# Patient Record
Sex: MALE | Race: WHITE | NOT HISPANIC OR LATINO | Employment: PART TIME | URBAN - METROPOLITAN AREA
[De-identification: names, ages, dates, MRNs, and addresses within clinical notes are randomized per-mention and may not be internally consistent; named-entity substitution may affect disease eponyms.]

---

## 2019-10-05 ENCOUNTER — HOSPITAL ENCOUNTER (INPATIENT)
Facility: HOSPITAL | Age: 49
LOS: 6 days | Discharge: HOME | End: 2019-10-11
Attending: EMERGENCY MEDICINE | Admitting: FAMILY MEDICINE

## 2019-10-05 ENCOUNTER — APPOINTMENT (OUTPATIENT)
Dept: CT IMAGING | Facility: HOSPITAL | Age: 49
End: 2019-10-05

## 2019-10-05 DIAGNOSIS — D50.0 IRON DEFICIENCY ANEMIA DUE TO CHRONIC BLOOD LOSS: ICD-10-CM

## 2019-10-05 DIAGNOSIS — K62.5 RECTAL BLEEDING: ICD-10-CM

## 2019-10-05 DIAGNOSIS — R19.7 DIARRHEA, UNSPECIFIED TYPE: ICD-10-CM

## 2019-10-05 DIAGNOSIS — R63.4 WEIGHT LOSS: ICD-10-CM

## 2019-10-05 DIAGNOSIS — K51.00 PANCOLITIS (HCC): Primary | ICD-10-CM

## 2019-10-05 DIAGNOSIS — D64.9 ANEMIA, UNSPECIFIED TYPE: ICD-10-CM

## 2019-10-05 PROBLEM — D75.839 THROMBOCYTOSIS: Status: ACTIVE | Noted: 2019-10-05

## 2019-10-05 PROBLEM — E87.6 HYPOKALEMIA: Status: ACTIVE | Noted: 2019-10-05

## 2019-10-05 PROBLEM — E88.09 HYPOALBUMINEMIA: Status: ACTIVE | Noted: 2019-10-05

## 2019-10-05 LAB
ALBUMIN SERPL-MCNC: 1.9 G/DL (ref 3.5–5.2)
ALBUMIN/GLOB SERPL: 0.5 G/DL
ALP SERPL-CCNC: 76 U/L (ref 39–117)
ALT SERPL W P-5'-P-CCNC: 15 U/L (ref 1–41)
ANION GAP SERPL CALCULATED.3IONS-SCNC: 10 MMOL/L (ref 5–15)
AST SERPL-CCNC: 16 U/L (ref 1–40)
BASOPHILS # BLD MANUAL: 0 10*3/MM3 (ref 0–0.2)
BASOPHILS NFR BLD AUTO: 0 % (ref 0–1.5)
BILIRUB SERPL-MCNC: 0.3 MG/DL (ref 0.2–1.2)
BILIRUB UR QL STRIP: NEGATIVE
BUN BLD-MCNC: 9 MG/DL (ref 6–20)
BUN/CREAT SERPL: 13.6 (ref 7–25)
C DIFF TOX GENS STL QL NAA+PROBE: NOT DETECTED
CALCIUM SPEC-SCNC: 7.8 MG/DL (ref 8.6–10.5)
CHLORIDE SERPL-SCNC: 99 MMOL/L (ref 98–107)
CLARITY UR: CLEAR
CO2 SERPL-SCNC: 23 MMOL/L (ref 22–29)
COLOR UR: ABNORMAL
CREAT BLD-MCNC: 0.66 MG/DL (ref 0.76–1.27)
D-LACTATE SERPL-SCNC: 0.8 MMOL/L (ref 0.5–2)
DEPRECATED RDW RBC AUTO: 57.9 FL (ref 37–54)
EOSINOPHIL # BLD MANUAL: 0.27 10*3/MM3 (ref 0–0.4)
EOSINOPHIL NFR BLD MANUAL: 6 % (ref 0.3–6.2)
ERYTHROCYTE [DISTWIDTH] IN BLOOD BY AUTOMATED COUNT: 18.7 % (ref 12.3–15.4)
GFR SERPL CREATININE-BSD FRML MDRD: 127 ML/MIN/1.73
GLOBULIN UR ELPH-MCNC: 4 GM/DL
GLUCOSE BLD-MCNC: 105 MG/DL (ref 65–99)
GLUCOSE UR STRIP-MCNC: NEGATIVE MG/DL
HCT VFR BLD AUTO: 26.9 % (ref 37.5–51)
HGB BLD-MCNC: 8.2 G/DL (ref 13–17.7)
HGB UR QL STRIP.AUTO: NEGATIVE
HOLD SPECIMEN: NORMAL
HOLD SPECIMEN: NORMAL
KETONES UR QL STRIP: NEGATIVE
LEUKOCYTE ESTERASE UR QL STRIP.AUTO: NEGATIVE
LIPASE SERPL-CCNC: 73 U/L (ref 13–60)
LYMPHOCYTES # BLD MANUAL: 0.98 10*3/MM3 (ref 0.7–3.1)
LYMPHOCYTES NFR BLD MANUAL: 10 % (ref 5–12)
LYMPHOCYTES NFR BLD MANUAL: 22 % (ref 19.6–45.3)
MCH RBC QN AUTO: 26.1 PG (ref 26.6–33)
MCHC RBC AUTO-ENTMCNC: 30.5 G/DL (ref 31.5–35.7)
MCV RBC AUTO: 85.7 FL (ref 79–97)
METAMYELOCYTES NFR BLD MANUAL: 2 % (ref 0–0)
MONOCYTES # BLD AUTO: 0.44 10*3/MM3 (ref 0.1–0.9)
MYELOCYTES NFR BLD MANUAL: 1 % (ref 0–0)
NEUTROPHILS # BLD AUTO: 2.62 10*3/MM3 (ref 1.7–7)
NEUTROPHILS NFR BLD MANUAL: 38 % (ref 42.7–76)
NEUTS BAND NFR BLD MANUAL: 21 % (ref 0–5)
NITRITE UR QL STRIP: NEGATIVE
PH UR STRIP.AUTO: 5.5 [PH] (ref 5–8)
PLAT MORPH BLD: NORMAL
PLATELET # BLD AUTO: 483 10*3/MM3 (ref 140–450)
PMV BLD AUTO: 8.3 FL (ref 6–12)
POTASSIUM BLD-SCNC: 3.3 MMOL/L (ref 3.5–5.2)
PROT SERPL-MCNC: 5.9 G/DL (ref 6–8.5)
PROT UR QL STRIP: ABNORMAL
RBC # BLD AUTO: 3.14 10*6/MM3 (ref 4.14–5.8)
RBC MORPH BLD: NORMAL
SODIUM BLD-SCNC: 132 MMOL/L (ref 136–145)
SP GR UR STRIP: 1.02 (ref 1–1.03)
UROBILINOGEN UR QL STRIP: ABNORMAL
WBC MORPH BLD: NORMAL
WBC NRBC COR # BLD: 4.44 10*3/MM3 (ref 3.4–10.8)
WHOLE BLOOD HOLD SPECIMEN: NORMAL
WHOLE BLOOD HOLD SPECIMEN: NORMAL

## 2019-10-05 PROCEDURE — 82728 ASSAY OF FERRITIN: CPT | Performed by: INTERNAL MEDICINE

## 2019-10-05 PROCEDURE — 81003 URINALYSIS AUTO W/O SCOPE: CPT | Performed by: EMERGENCY MEDICINE

## 2019-10-05 PROCEDURE — 85045 AUTOMATED RETICULOCYTE COUNT: CPT | Performed by: INTERNAL MEDICINE

## 2019-10-05 PROCEDURE — 83735 ASSAY OF MAGNESIUM: CPT | Performed by: INTERNAL MEDICINE

## 2019-10-05 PROCEDURE — 0097U HC BIOFIRE FILMARRAY GI PANEL: CPT | Performed by: PHYSICIAN ASSISTANT

## 2019-10-05 PROCEDURE — 87493 C DIFF AMPLIFIED PROBE: CPT | Performed by: PHYSICIAN ASSISTANT

## 2019-10-05 PROCEDURE — 85025 COMPLETE CBC W/AUTO DIFF WBC: CPT | Performed by: EMERGENCY MEDICINE

## 2019-10-05 PROCEDURE — 83690 ASSAY OF LIPASE: CPT | Performed by: EMERGENCY MEDICINE

## 2019-10-05 PROCEDURE — 84100 ASSAY OF PHOSPHORUS: CPT | Performed by: INTERNAL MEDICINE

## 2019-10-05 PROCEDURE — 99284 EMERGENCY DEPT VISIT MOD MDM: CPT

## 2019-10-05 PROCEDURE — 83540 ASSAY OF IRON: CPT | Performed by: INTERNAL MEDICINE

## 2019-10-05 PROCEDURE — 85007 BL SMEAR W/DIFF WBC COUNT: CPT | Performed by: EMERGENCY MEDICINE

## 2019-10-05 PROCEDURE — 74176 CT ABD & PELVIS W/O CONTRAST: CPT

## 2019-10-05 PROCEDURE — 82746 ASSAY OF FOLIC ACID SERUM: CPT | Performed by: INTERNAL MEDICINE

## 2019-10-05 PROCEDURE — 84145 PROCALCITONIN (PCT): CPT | Performed by: INTERNAL MEDICINE

## 2019-10-05 PROCEDURE — 85652 RBC SED RATE AUTOMATED: CPT | Performed by: INTERNAL MEDICINE

## 2019-10-05 PROCEDURE — 86900 BLOOD TYPING SEROLOGIC ABO: CPT | Performed by: INTERNAL MEDICINE

## 2019-10-05 PROCEDURE — 86923 COMPATIBILITY TEST ELECTRIC: CPT

## 2019-10-05 PROCEDURE — 80053 COMPREHEN METABOLIC PANEL: CPT | Performed by: EMERGENCY MEDICINE

## 2019-10-05 PROCEDURE — 86140 C-REACTIVE PROTEIN: CPT | Performed by: INTERNAL MEDICINE

## 2019-10-05 PROCEDURE — 84466 ASSAY OF TRANSFERRIN: CPT | Performed by: INTERNAL MEDICINE

## 2019-10-05 PROCEDURE — 82607 VITAMIN B-12: CPT | Performed by: INTERNAL MEDICINE

## 2019-10-05 PROCEDURE — 83605 ASSAY OF LACTIC ACID: CPT | Performed by: EMERGENCY MEDICINE

## 2019-10-05 PROCEDURE — 25010000002 PIPERACILLIN SOD-TAZOBACTAM PER 1 G: Performed by: PHYSICIAN ASSISTANT

## 2019-10-05 PROCEDURE — 84443 ASSAY THYROID STIM HORMONE: CPT | Performed by: INTERNAL MEDICINE

## 2019-10-05 PROCEDURE — 86850 RBC ANTIBODY SCREEN: CPT | Performed by: INTERNAL MEDICINE

## 2019-10-05 PROCEDURE — 86901 BLOOD TYPING SEROLOGIC RH(D): CPT | Performed by: INTERNAL MEDICINE

## 2019-10-05 RX ORDER — SODIUM CHLORIDE 0.9 % (FLUSH) 0.9 %
10 SYRINGE (ML) INJECTION AS NEEDED
Status: DISCONTINUED | OUTPATIENT
Start: 2019-10-05 | End: 2019-10-11 | Stop reason: HOSPADM

## 2019-10-05 RX ADMIN — SODIUM CHLORIDE 1000 ML: 9 INJECTION, SOLUTION INTRAVENOUS at 17:47

## 2019-10-05 RX ADMIN — SODIUM CHLORIDE 1000 ML: 9 INJECTION, SOLUTION INTRAVENOUS at 22:21

## 2019-10-05 RX ADMIN — TAZOBACTAM SODIUM AND PIPERACILLIN SODIUM 3.38 G: 375; 3 INJECTION, SOLUTION INTRAVENOUS at 22:20

## 2019-10-05 NOTE — ED PROVIDER NOTES
Subjective   Rafa Dan is a 49 y.o. male who presents to the ED with complaints of diarrhea. The patient reports that he has been experience diarrhea for the past several months. He states that he went to [place] on 8/1 and was diagnosed with acute colitis. He was placed on antibiotics, but has not experienced any relief. He notes that he has approximately 20 episodes of diarrhea a day. He denies abdominal pain. He notes that he has lost approximately 30 pounds since July. He denies drug, tobacco, and alcohol use. There are no other acute complaints at this time.        History provided by:  Patient  Diarrhea   The primary symptoms include nausea and diarrhea. Primary symptoms do not include abdominal pain, vomiting or dysuria. The illness began more than 7 days ago. The onset was sudden. The problem has not changed since onset.      Review of Systems   Respiratory: Negative for chest tightness and shortness of breath.    Gastrointestinal: Positive for blood in stool, diarrhea and nausea. Negative for abdominal pain and vomiting.   Genitourinary: Negative for dysuria and frequency.   Neurological: Negative for dizziness and weakness.   Psychiatric/Behavioral: Negative.    All other systems reviewed and are negative.      Past Medical History:   Diagnosis Date   • Colitis        No Known Allergies    History reviewed. No pertinent surgical history.    History reviewed. No pertinent family history.    Social History     Socioeconomic History   • Marital status: Single     Spouse name: Not on file   • Number of children: Not on file   • Years of education: Not on file   • Highest education level: Not on file   Tobacco Use   • Smoking status: Never Smoker   • Smokeless tobacco: Never Used   Substance and Sexual Activity   • Alcohol use: No     Frequency: Never   • Drug use: No         Objective   Physical Exam   Constitutional: He is oriented to person, place, and time. He appears well-developed and well-nourished.    Thin wasted gaunt   HENT:   Head: Normocephalic and atraumatic.   Eyes: Conjunctivae are normal. No scleral icterus.   Neck: Normal range of motion. Neck supple.   Cardiovascular: Normal rate, regular rhythm and normal heart sounds.   Pulmonary/Chest: Effort normal and breath sounds normal.   Abdominal: Soft. There is no tenderness.   Musculoskeletal: Normal range of motion.   Neurological: He is alert and oriented to person, place, and time.   Skin: Skin is warm and dry.   Psychiatric: He has a normal mood and affect. His behavior is normal.   Nursing note and vitals reviewed.      Procedures         ED Course  ED Course as of Oct 06 2135   Sat Oct 05, 2019   2112 Hemoglobin: (!) 8.2 [RS]   2145 Discussed the patient with Dr. Melendez colorectal surgery on call.  She felt like with the 20 pound weight loss the patient is anemic and the pain colitis that he probably should be admitted.  Discussed this with the patient and his's friends/family.  He is in agreements.  I have paged the hospitalist.  [VIVI]   2158 Spoke to Dr. Gomes she is agreed to admit the patient to the hospital telemetry.  [VIVI]      ED Course User Index  [VIVI] Tod Flores PA  [RS] Cuong Vincent MD       Recent Results (from the past 24 hour(s))   Sedimentation Rate    Collection Time: 10/05/19 11:55 PM   Result Value Ref Range    Sed Rate 43 (H) 0 - 20 mm/hr   Reticulocytes    Collection Time: 10/05/19 11:55 PM   Result Value Ref Range    Reticulocyte % 4.46 (H) 0.70 - 1.90 %    Reticulocyte Absolute 0.1383 (H) 0.0200 - 0.1300 10*6/mm3   Type & Screen    Collection Time: 10/05/19 11:55 PM   Result Value Ref Range    ABO Type A     RH type Positive     Antibody Screen Negative     T&S Expiration Date 10/8/2019 11:59:59 PM    Occult Blood X 1, Stool - Stool, Per Rectum    Collection Time: 10/06/19  1:00 AM   Result Value Ref Range    Fecal Occult Blood Positive (A) Negative   Basic Metabolic Panel    Collection Time: 10/06/19  3:30 AM    Result Value Ref Range    Glucose 100 (H) 65 - 99 mg/dL    BUN 7 6 - 20 mg/dL    Creatinine 0.65 (L) 0.76 - 1.27 mg/dL    Sodium 133 (L) 136 - 145 mmol/L    Potassium 3.0 (L) 3.5 - 5.2 mmol/L    Chloride 102 98 - 107 mmol/L    CO2 23.0 22.0 - 29.0 mmol/L    Calcium 6.9 (L) 8.6 - 10.5 mg/dL    eGFR Non African Amer 130 >60 mL/min/1.73    BUN/Creatinine Ratio 10.8 7.0 - 25.0    Anion Gap 8.0 5.0 - 15.0 mmol/L   CBC Auto Differential    Collection Time: 10/06/19  3:30 AM   Result Value Ref Range    WBC 3.19 (L) 3.40 - 10.80 10*3/mm3    RBC 2.74 (L) 4.14 - 5.80 10*6/mm3    Hemoglobin 7.0 (L) 13.0 - 17.7 g/dL    Hematocrit 23.3 (L) 37.5 - 51.0 %    MCV 85.0 79.0 - 97.0 fL    MCH 25.5 (L) 26.6 - 33.0 pg    MCHC 30.0 (L) 31.5 - 35.7 g/dL    RDW 18.7 (H) 12.3 - 15.4 %    RDW-SD 56.0 (H) 37.0 - 54.0 fl    MPV 8.4 6.0 - 12.0 fL    Platelets 408 140 - 450 10*3/mm3   Lipid Panel    Collection Time: 10/06/19  3:30 AM   Result Value Ref Range    Total Cholesterol 81 0 - 200 mg/dL    Triglycerides 30 0 - 150 mg/dL    HDL Cholesterol 39 (L) 40 - 60 mg/dL    LDL Cholesterol  36 0 - 100 mg/dL    VLDL Cholesterol 6 mg/dL    LDL/HDL Ratio 0.92    Vitamin B12    Collection Time: 10/06/19  3:30 AM   Result Value Ref Range    Vitamin B-12 1,270 (H) 211 - 946 pg/mL   HIV-1 / O / 2 Ag / Antibody 4th Generation    Collection Time: 10/06/19  3:30 AM   Result Value Ref Range    HIV-1/ HIV-2 Non-Reactive Non-Reactive   ABO RH Specimen Verification    Collection Time: 10/06/19  3:30 AM   Result Value Ref Range    ABO Type A     RH type Positive    Phosphorus    Collection Time: 10/06/19  3:30 AM   Result Value Ref Range    Phosphorus 2.9 2.5 - 4.5 mg/dL   Scan Slide    Collection Time: 10/06/19  3:30 AM   Result Value Ref Range    Scan Slide     Manual Differential    Collection Time: 10/06/19  3:30 AM   Result Value Ref Range    Neutrophil % 39.0 (L) 42.7 - 76.0 %    Lymphocyte % 22.0 19.6 - 45.3 %    Monocyte % 10.0 5.0 - 12.0 %     Eosinophil % 9.0 (H) 0.3 - 6.2 %    Basophil % 4.0 (H) 0.0 - 1.5 %    Bands %  15.0 (H) 0.0 - 5.0 %    Metamyelocyte % 1.0 (H) 0.0 - 0.0 %    Neutrophils Absolute 1.72 1.70 - 7.00 10*3/mm3    Lymphocytes Absolute 0.70 0.70 - 3.10 10*3/mm3    Monocytes Absolute 0.32 0.10 - 0.90 10*3/mm3    Eosinophils Absolute 0.29 0.00 - 0.40 10*3/mm3    Basophils Absolute 0.13 0.00 - 0.20 10*3/mm3    RBC Morphology Normal Normal    WBC Morphology Normal Normal    Platelet Morphology Normal Normal   Magnesium    Collection Time: 10/06/19  3:30 AM   Result Value Ref Range    Magnesium 1.5 (L) 1.6 - 2.6 mg/dL   Calcium, Ionized    Collection Time: 10/06/19  3:31 AM   Result Value Ref Range    Ionized Calcium 1.15 1.12 - 1.32 mmol/L   CBC (No Diff)    Collection Time: 10/06/19  4:01 PM   Result Value Ref Range    WBC 4.22 3.40 - 10.80 10*3/mm3    RBC 2.53 (L) 4.14 - 5.80 10*6/mm3    Hemoglobin 6.6 (C) 13.0 - 17.7 g/dL    Hematocrit 22.1 (L) 37.5 - 51.0 %    MCV 87.4 79.0 - 97.0 fL    MCH 26.1 (L) 26.6 - 33.0 pg    MCHC 29.9 (L) 31.5 - 35.7 g/dL    RDW 19.2 (H) 12.3 - 15.4 %    RDW-SD 59.1 (H) 37.0 - 54.0 fl    MPV 8.1 6.0 - 12.0 fL    Platelets 403 140 - 450 10*3/mm3   Prepare RBC, 1 Units    Collection Time: 10/06/19  6:39 PM   Result Value Ref Range    Product Code R2726M22     Unit Number [id number]     UNIT  ABO A     UNIT  RH NEG     Dispense Status IS     Blood Type ANEG     Blood Expiration Date [id number]     Blood Type Barcode 0600      Note: In addition to lab results from this visit, the labs listed above may include labs taken at another facility or during a different encounter within the last 24 hours. Please correlate lab times with ED admission and discharge times for further clarification of the services performed during this visit.    CT Abdomen Pelvis Without Contrast   Preliminary Result       There is diffuse pancolonic inflammation without evidence of pericolonic   abscess or free air.       Nonspecific  prominent fluid-filled loops of small bowel suggesting   additional superimposed enteritis.       What is felt to represent the appendix within the right lower quadrant   is inflamed and measures up to 8 mm in thickness, although this is in   the setting of extensive pericecal inflammation and is likely reactive   appendiceal inflammation given the extensive surrounding and adjacent   colitis noted.       DICTATED:   10/05/2019   EDITED/ls :   10/06/2019             Vitals:    10/06/19 1855 10/06/19 1900 10/06/19 1930 10/06/19 1935   BP: 99/62  (!) 79/64 116/63   BP Location:       Patient Position:       Pulse: 87  94 93   Resp:       Temp:       TempSrc:  Oral     SpO2: 100%  100% 100%   Weight:       Height:         Medications   sodium chloride 0.9 % flush 10 mL (not administered)   sodium chloride 0.9 % flush 10 mL (10 mL Intravenous Not Given 10/6/19 2051)   sodium chloride 0.9 % flush 10 mL (not administered)   potassium chloride (MICRO-K) CR capsule 40 mEq (40 mEq Oral Given 10/6/19 1357)     Or   potassium chloride (KLOR-CON) packet 40 mEq ( Oral Not Given:  See Alt 10/6/19 1357)     Or   potassium chloride 10 mEq in 100 mL IVPB ( Intravenous Not Given:  See Alt 10/6/19 1357)   Magnesium Sulfate 2 gram Bolus, followed by 8 gram infusion (total Mg dose 10 grams)- Mg less than or equal to 1mg/dL ( Intravenous Not Given:  See Alt 10/6/19 0559)     Or   Magnesium Sulfate 2 gram / 50mL Infusion (GIVE X 3 BAGS TO EQUAL 6GM TOTAL DOSE) - Mg 1.1 - 1.5 mg/dl ( Intravenous Not Given:  See Alt 10/6/19 0559)     Or   Magnesium Sulfate 4 gram infusion- Mg 1.6-1.9 mg/dL (4 g Intravenous New Bag 10/6/19 0559)   potassium & sodium phosphates (PHOS-NAK) 280-160-250 MG packet - for Phosphorus less than 1.25 mg/dL (not administered)     Or   potassium & sodium phosphates (PHOS-NAK) 280-160-250 MG packet - for Phosphorus 1.25 - 2.5 mg/dL (not administered)   ondansetron (ZOFRAN) tablet 4 mg (not administered)     Or    ondansetron (ZOFRAN) injection 4 mg (not administered)   metroNIDAZOLE (FLAGYL) 500 mg/100mL IVPB (500 mg Intravenous New Bag 10/6/19 1723)   sodium chloride 0.9 % with KCl 20 mEq/L infusion (100 mL/hr Intravenous New Bag 10/6/19 0944)   cefTRIAXone (ROCEPHIN) 2 g/100 mL 0.9% NS VTB (ERIBERTO) (2 g Intravenous New Bag 10/6/19 1549)   lactobacillus acidophilus (RISAQUAD) capsule 1 capsule (1 capsule Oral Given 10/6/19 1357)   PEG-KCl-NaCl-NaSulf-Na Asc-C (MOVIPREP) powder 1,000 mL (1,000 mL Oral Given 10/6/19 2050)     Followed by   PEG-KCl-NaCl-NaSulf-Na Asc-C (MOVIPREP) powder 1,000 mL (not administered)   sodium chloride 0.9 % bolus 1,000 mL (0 mL Intravenous Stopped 10/5/19 2108)   piperacillin-tazobactam (ZOSYN) 3.375 g in iso-osmotic dextrose 50 ml (premix) (0 g Intravenous Stopped 10/5/19 2225)   sodium chloride 0.9 % bolus 1,000 mL (1,000 mL Intravenous New Bag 10/5/19 2221)   sodium chloride 0.9 % bolus 1,000 mL (1,000 mL Intravenous New Bag 10/6/19 0150)   sodium chloride 0.9 % bolus 500 mL (500 mL Intravenous New Bag 10/6/19 1358)     ECG/EMG Results (last 24 hours)     ** No results found for the last 24 hours. **        No orders to display                     MDM  Number of Diagnoses or Management Options  Anemia, unspecified type: new and requires workup  Diarrhea, unspecified type: new and requires workup  Pancolitis (CMS/HCC): new and requires workup  Weight loss: new and requires workup     Amount and/or Complexity of Data Reviewed  Clinical lab tests: reviewed and ordered  Tests in the radiology section of CPT®: reviewed and ordered  Tests in the medicine section of CPT®: ordered and reviewed  Discuss the patient with other providers: yes    Patient Progress  Patient progress: stable      Final diagnoses:   Pancolitis (CMS/HCC)   Anemia, unspecified type   Weight loss   Diarrhea, unspecified type       Documentation assistance provided by cherry Greene.  Information recorded by the  scribe was done at my direction and has been verified and validated by me.     Laney Greene  10/05/19 1819       Tod Flores PA  10/06/19 2098

## 2019-10-06 PROBLEM — E43 SEVERE PROTEIN-CALORIE MALNUTRITION (HCC): Status: ACTIVE | Noted: 2019-10-06

## 2019-10-06 PROBLEM — D50.9 IRON DEFICIENCY ANEMIA: Status: ACTIVE | Noted: 2019-10-05

## 2019-10-06 PROBLEM — R63.4 UNINTENTIONAL WEIGHT LOSS: Status: ACTIVE | Noted: 2019-10-06

## 2019-10-06 PROBLEM — K62.5 RECTAL BLEEDING: Status: ACTIVE | Noted: 2019-10-06

## 2019-10-06 PROBLEM — R19.7 INTRACTABLE DIARRHEA: Status: ACTIVE | Noted: 2019-10-06

## 2019-10-06 LAB
ABO GROUP BLD: NORMAL
ABO GROUP BLD: NORMAL
ADV 40+41 DNA STL QL NAA+NON-PROBE: NOT DETECTED
ANION GAP SERPL CALCULATED.3IONS-SCNC: 8 MMOL/L (ref 5–15)
ASTRO TYP 1-8 RNA STL QL NAA+NON-PROBE: NOT DETECTED
BASOPHILS # BLD MANUAL: 0.13 10*3/MM3 (ref 0–0.2)
BASOPHILS NFR BLD AUTO: 4 % (ref 0–1.5)
BLD GP AB SCN SERPL QL: NEGATIVE
BUN BLD-MCNC: 7 MG/DL (ref 6–20)
BUN/CREAT SERPL: 10.8 (ref 7–25)
C CAYETANENSIS DNA STL QL NAA+NON-PROBE: NOT DETECTED
CA-I SERPL ISE-MCNC: 1.15 MMOL/L (ref 1.12–1.32)
CALCIUM SPEC-SCNC: 6.9 MG/DL (ref 8.6–10.5)
CAMPY SP DNA.DIARRHEA STL QL NAA+PROBE: NOT DETECTED
CHLORIDE SERPL-SCNC: 102 MMOL/L (ref 98–107)
CHOLEST SERPL-MCNC: 81 MG/DL (ref 0–200)
CO2 SERPL-SCNC: 23 MMOL/L (ref 22–29)
CREAT BLD-MCNC: 0.65 MG/DL (ref 0.76–1.27)
CRP SERPL-MCNC: 4.15 MG/DL (ref 0–0.5)
CRYPTOSP STL CULT: NOT DETECTED
DEPRECATED RDW RBC AUTO: 56 FL (ref 37–54)
DEPRECATED RDW RBC AUTO: 59.1 FL (ref 37–54)
E COLI DNA SPEC QL NAA+PROBE: NOT DETECTED
E HISTOLYT AG STL-ACNC: NOT DETECTED
EAEC PAA PLAS AGGR+AATA ST NAA+NON-PRB: NOT DETECTED
EC STX1 + STX2 GENES STL NAA+PROBE: NOT DETECTED
EOSINOPHIL # BLD MANUAL: 0.29 10*3/MM3 (ref 0–0.4)
EOSINOPHIL NFR BLD MANUAL: 9 % (ref 0.3–6.2)
EPEC EAE GENE STL QL NAA+NON-PROBE: NOT DETECTED
ERYTHROCYTE [DISTWIDTH] IN BLOOD BY AUTOMATED COUNT: 18.7 % (ref 12.3–15.4)
ERYTHROCYTE [DISTWIDTH] IN BLOOD BY AUTOMATED COUNT: 19.2 % (ref 12.3–15.4)
ERYTHROCYTE [SEDIMENTATION RATE] IN BLOOD: 43 MM/HR (ref 0–20)
ETEC LTA+ST1A+ST1B TOX ST NAA+NON-PROBE: NOT DETECTED
FERRITIN SERPL-MCNC: 47.07 NG/ML (ref 30–400)
FOLATE SERPL-MCNC: 6.44 NG/ML (ref 4.78–24.2)
G LAMBLIA DNA SPEC QL NAA+PROBE: NOT DETECTED
GFR SERPL CREATININE-BSD FRML MDRD: 130 ML/MIN/1.73
GLUCOSE BLD-MCNC: 100 MG/DL (ref 65–99)
HCT VFR BLD AUTO: 22.1 % (ref 37.5–51)
HCT VFR BLD AUTO: 23.3 % (ref 37.5–51)
HDLC SERPL-MCNC: 39 MG/DL (ref 40–60)
HEMOCCULT STL QL: POSITIVE
HGB BLD-MCNC: 6.6 G/DL (ref 13–17.7)
HGB BLD-MCNC: 7 G/DL (ref 13–17.7)
HIV1+2 AB SER QL: NORMAL
IRON 24H UR-MRATE: 14 MCG/DL (ref 59–158)
IRON SATN MFR SERPL: 8 % (ref 20–50)
LDLC SERPL CALC-MCNC: 36 MG/DL (ref 0–100)
LDLC/HDLC SERPL: 0.92 {RATIO}
LYMPHOCYTES # BLD MANUAL: 0.7 10*3/MM3 (ref 0.7–3.1)
LYMPHOCYTES NFR BLD MANUAL: 10 % (ref 5–12)
LYMPHOCYTES NFR BLD MANUAL: 22 % (ref 19.6–45.3)
MAGNESIUM SERPL-MCNC: 1.5 MG/DL (ref 1.6–2.6)
MAGNESIUM SERPL-MCNC: 1.6 MG/DL (ref 1.6–2.6)
MCH RBC QN AUTO: 25.5 PG (ref 26.6–33)
MCH RBC QN AUTO: 26.1 PG (ref 26.6–33)
MCHC RBC AUTO-ENTMCNC: 29.9 G/DL (ref 31.5–35.7)
MCHC RBC AUTO-ENTMCNC: 30 G/DL (ref 31.5–35.7)
MCV RBC AUTO: 85 FL (ref 79–97)
MCV RBC AUTO: 87.4 FL (ref 79–97)
METAMYELOCYTES NFR BLD MANUAL: 1 % (ref 0–0)
MONOCYTES # BLD AUTO: 0.32 10*3/MM3 (ref 0.1–0.9)
NEUTROPHILS # BLD AUTO: 1.72 10*3/MM3 (ref 1.7–7)
NEUTROPHILS NFR BLD MANUAL: 39 % (ref 42.7–76)
NEUTS BAND NFR BLD MANUAL: 15 % (ref 0–5)
NOROVIRUS GI+II RNA STL QL NAA+NON-PROBE: NOT DETECTED
P SHIGELLOIDES DNA STL QL NAA+PROBE: NOT DETECTED
PHOSPHATE SERPL-MCNC: 2.9 MG/DL (ref 2.5–4.5)
PHOSPHATE SERPL-MCNC: 3.3 MG/DL (ref 2.5–4.5)
PLAT MORPH BLD: NORMAL
PLATELET # BLD AUTO: 403 10*3/MM3 (ref 140–450)
PLATELET # BLD AUTO: 408 10*3/MM3 (ref 140–450)
PMV BLD AUTO: 8.1 FL (ref 6–12)
PMV BLD AUTO: 8.4 FL (ref 6–12)
POTASSIUM BLD-SCNC: 3 MMOL/L (ref 3.5–5.2)
PROCALCITONIN SERPL-MCNC: 0.1 NG/ML (ref 0.1–0.25)
RBC # BLD AUTO: 2.53 10*6/MM3 (ref 4.14–5.8)
RBC # BLD AUTO: 2.74 10*6/MM3 (ref 4.14–5.8)
RBC MORPH BLD: NORMAL
RETICS # AUTO: 0.14 10*6/MM3 (ref 0.02–0.13)
RETICS/RBC NFR AUTO: 4.46 % (ref 0.7–1.9)
RH BLD: POSITIVE
RH BLD: POSITIVE
RV RNA STL NAA+PROBE: NOT DETECTED
SALMONELLA DNA SPEC QL NAA+PROBE: NOT DETECTED
SAPO I+II+IV+V RNA STL QL NAA+NON-PROBE: NOT DETECTED
SCAN SLIDE: NORMAL
SHIGELLA SP+EIEC IPAH STL QL NAA+PROBE: NOT DETECTED
SODIUM BLD-SCNC: 133 MMOL/L (ref 136–145)
T&S EXPIRATION DATE: NORMAL
TIBC SERPL-MCNC: 186 MCG/DL (ref 298–536)
TRANSFERRIN SERPL-MCNC: 125 MG/DL (ref 200–360)
TRIGL SERPL-MCNC: 30 MG/DL (ref 0–150)
TSH SERPL DL<=0.05 MIU/L-ACNC: 2.9 UIU/ML (ref 0.27–4.2)
V CHOLERAE DNA SPEC QL NAA+PROBE: NOT DETECTED
VIBRIO DNA SPEC NAA+PROBE: NOT DETECTED
VIT B12 BLD-MCNC: 1270 PG/ML (ref 211–946)
VIT B12 BLD-MCNC: 1392 PG/ML (ref 211–946)
VLDLC SERPL-MCNC: 6 MG/DL
WBC MORPH BLD: NORMAL
WBC NRBC COR # BLD: 3.19 10*3/MM3 (ref 3.4–10.8)
WBC NRBC COR # BLD: 4.22 10*3/MM3 (ref 3.4–10.8)
YERSINIA STL CULT: NOT DETECTED

## 2019-10-06 PROCEDURE — 86900 BLOOD TYPING SEROLOGIC ABO: CPT

## 2019-10-06 PROCEDURE — 99223 1ST HOSP IP/OBS HIGH 75: CPT | Performed by: INTERNAL MEDICINE

## 2019-10-06 PROCEDURE — 83735 ASSAY OF MAGNESIUM: CPT

## 2019-10-06 PROCEDURE — 87340 HEPATITIS B SURFACE AG IA: CPT | Performed by: INTERNAL MEDICINE

## 2019-10-06 PROCEDURE — 85027 COMPLETE CBC AUTOMATED: CPT | Performed by: INTERNAL MEDICINE

## 2019-10-06 PROCEDURE — 80061 LIPID PANEL: CPT | Performed by: INTERNAL MEDICINE

## 2019-10-06 PROCEDURE — 82330 ASSAY OF CALCIUM: CPT | Performed by: INTERNAL MEDICINE

## 2019-10-06 PROCEDURE — 82607 VITAMIN B-12: CPT | Performed by: INTERNAL MEDICINE

## 2019-10-06 PROCEDURE — 85025 COMPLETE CBC W/AUTO DIFF WBC: CPT | Performed by: INTERNAL MEDICINE

## 2019-10-06 PROCEDURE — P9016 RBC LEUKOCYTES REDUCED: HCPCS

## 2019-10-06 PROCEDURE — 87040 BLOOD CULTURE FOR BACTERIA: CPT | Performed by: INTERNAL MEDICINE

## 2019-10-06 PROCEDURE — 25010000002 CEFTRIAXONE PER 250 MG: Performed by: INTERNAL MEDICINE

## 2019-10-06 PROCEDURE — 25810000003 SODIUM CHLORIDE 0.9 % WITH KCL 20 MEQ 20-0.9 MEQ/L-% SOLUTION: Performed by: INTERNAL MEDICINE

## 2019-10-06 PROCEDURE — 84100 ASSAY OF PHOSPHORUS: CPT | Performed by: INTERNAL MEDICINE

## 2019-10-06 PROCEDURE — 82272 OCCULT BLD FECES 1-3 TESTS: CPT | Performed by: INTERNAL MEDICINE

## 2019-10-06 PROCEDURE — 86901 BLOOD TYPING SEROLOGIC RH(D): CPT

## 2019-10-06 PROCEDURE — 99254 IP/OBS CNSLTJ NEW/EST MOD 60: CPT | Performed by: INTERNAL MEDICINE

## 2019-10-06 PROCEDURE — G0432 EIA HIV-1/HIV-2 SCREEN: HCPCS | Performed by: INTERNAL MEDICINE

## 2019-10-06 PROCEDURE — 80048 BASIC METABOLIC PNL TOTAL CA: CPT | Performed by: INTERNAL MEDICINE

## 2019-10-06 PROCEDURE — 36430 TRANSFUSION BLD/BLD COMPNT: CPT

## 2019-10-06 PROCEDURE — 25010000002 LEVOFLOXACIN PER 250 MG: Performed by: INTERNAL MEDICINE

## 2019-10-06 RX ORDER — SODIUM CHLORIDE 9 MG/ML
100 INJECTION, SOLUTION INTRAVENOUS CONTINUOUS
Status: DISCONTINUED | OUTPATIENT
Start: 2019-10-06 | End: 2019-10-06

## 2019-10-06 RX ORDER — SODIUM CHLORIDE 0.9 % (FLUSH) 0.9 %
10 SYRINGE (ML) INJECTION AS NEEDED
Status: DISCONTINUED | OUTPATIENT
Start: 2019-10-06 | End: 2019-10-11 | Stop reason: HOSPADM

## 2019-10-06 RX ORDER — MAGNESIUM SULFATE HEPTAHYDRATE 40 MG/ML
2 INJECTION, SOLUTION INTRAVENOUS AS NEEDED
Status: DISCONTINUED | OUTPATIENT
Start: 2019-10-06 | End: 2019-10-11 | Stop reason: HOSPADM

## 2019-10-06 RX ORDER — SODIUM CHLORIDE AND POTASSIUM CHLORIDE 150; 900 MG/100ML; MG/100ML
75 INJECTION, SOLUTION INTRAVENOUS CONTINUOUS
Status: DISCONTINUED | OUTPATIENT
Start: 2019-10-06 | End: 2019-10-10

## 2019-10-06 RX ORDER — SODIUM CHLORIDE 0.9 % (FLUSH) 0.9 %
10 SYRINGE (ML) INJECTION EVERY 12 HOURS SCHEDULED
Status: DISCONTINUED | OUTPATIENT
Start: 2019-10-06 | End: 2019-10-11 | Stop reason: HOSPADM

## 2019-10-06 RX ORDER — MAGNESIUM SULFATE HEPTAHYDRATE 40 MG/ML
4 INJECTION, SOLUTION INTRAVENOUS AS NEEDED
Status: DISCONTINUED | OUTPATIENT
Start: 2019-10-06 | End: 2019-10-11 | Stop reason: HOSPADM

## 2019-10-06 RX ORDER — PEG-3350, SODIUM SULFATE, SODIUM CHLORIDE, POTASSIUM CHLORIDE, SODIUM ASCORBATE AND ASCORBIC ACID 7.5-2.691G
1000 KIT ORAL
Status: COMPLETED | OUTPATIENT
Start: 2019-10-07 | End: 2019-10-07

## 2019-10-06 RX ORDER — L.ACID,PARA/B.BIFIDUM/S.THERM 8B CELL
1 CAPSULE ORAL DAILY
Status: DISCONTINUED | OUTPATIENT
Start: 2019-10-06 | End: 2019-10-11 | Stop reason: HOSPADM

## 2019-10-06 RX ORDER — PEG-3350, SODIUM SULFATE, SODIUM CHLORIDE, POTASSIUM CHLORIDE, SODIUM ASCORBATE AND ASCORBIC ACID 7.5-2.691G
1000 KIT ORAL
Status: COMPLETED | OUTPATIENT
Start: 2019-10-06 | End: 2019-10-06

## 2019-10-06 RX ORDER — ONDANSETRON 2 MG/ML
4 INJECTION INTRAMUSCULAR; INTRAVENOUS EVERY 6 HOURS PRN
Status: DISCONTINUED | OUTPATIENT
Start: 2019-10-06 | End: 2019-10-11 | Stop reason: HOSPADM

## 2019-10-06 RX ORDER — POTASSIUM CHLORIDE 7.45 MG/ML
10 INJECTION INTRAVENOUS
Status: DISCONTINUED | OUTPATIENT
Start: 2019-10-06 | End: 2019-10-11 | Stop reason: HOSPADM

## 2019-10-06 RX ORDER — LEVOFLOXACIN 5 MG/ML
750 INJECTION, SOLUTION INTRAVENOUS EVERY 24 HOURS
Status: DISCONTINUED | OUTPATIENT
Start: 2019-10-06 | End: 2019-10-06

## 2019-10-06 RX ORDER — POTASSIUM CHLORIDE 750 MG/1
40 CAPSULE, EXTENDED RELEASE ORAL AS NEEDED
Status: DISCONTINUED | OUTPATIENT
Start: 2019-10-06 | End: 2019-10-11 | Stop reason: HOSPADM

## 2019-10-06 RX ORDER — POTASSIUM CHLORIDE 1.5 G/1.77G
40 POWDER, FOR SOLUTION ORAL AS NEEDED
Status: DISCONTINUED | OUTPATIENT
Start: 2019-10-06 | End: 2019-10-11 | Stop reason: HOSPADM

## 2019-10-06 RX ORDER — ONDANSETRON 4 MG/1
4 TABLET, FILM COATED ORAL EVERY 6 HOURS PRN
Status: DISCONTINUED | OUTPATIENT
Start: 2019-10-06 | End: 2019-10-11 | Stop reason: HOSPADM

## 2019-10-06 RX ADMIN — SODIUM CHLORIDE 100 ML/HR: 9 INJECTION, SOLUTION INTRAVENOUS at 01:51

## 2019-10-06 RX ADMIN — Medication 1 CAPSULE: at 13:57

## 2019-10-06 RX ADMIN — METRONIDAZOLE 500 MG: 500 INJECTION, SOLUTION INTRAVENOUS at 09:54

## 2019-10-06 RX ADMIN — POTASSIUM CHLORIDE AND SODIUM CHLORIDE 100 ML/HR: 900; 150 INJECTION, SOLUTION INTRAVENOUS at 09:44

## 2019-10-06 RX ADMIN — SODIUM CHLORIDE 1000 ML: 9 INJECTION, SOLUTION INTRAVENOUS at 01:50

## 2019-10-06 RX ADMIN — METRONIDAZOLE 500 MG: 500 INJECTION, SOLUTION INTRAVENOUS at 01:51

## 2019-10-06 RX ADMIN — POTASSIUM CHLORIDE 40 MEQ: 750 CAPSULE, EXTENDED RELEASE ORAL at 05:59

## 2019-10-06 RX ADMIN — CEFTRIAXONE 2 G: 2 INJECTION, POWDER, FOR SOLUTION INTRAMUSCULAR; INTRAVENOUS at 15:49

## 2019-10-06 RX ADMIN — MAGNESIUM SULFATE HEPTAHYDRATE 4 G: 40 INJECTION, SOLUTION INTRAVENOUS at 05:59

## 2019-10-06 RX ADMIN — METRONIDAZOLE 500 MG: 500 INJECTION, SOLUTION INTRAVENOUS at 17:23

## 2019-10-06 RX ADMIN — SODIUM CHLORIDE 500 ML: 9 INJECTION, SOLUTION INTRAVENOUS at 13:58

## 2019-10-06 RX ADMIN — POLYETHYLENE GLYCOL 3350, SODIUM SULFATE, SODIUM CHLORIDE, POTASSIUM CHLORIDE, ASCORBIC ACID, SODIUM ASCORBATE 1000 ML: KIT at 20:50

## 2019-10-06 RX ADMIN — SODIUM CHLORIDE, PRESERVATIVE FREE 10 ML: 5 INJECTION INTRAVENOUS at 01:53

## 2019-10-06 RX ADMIN — POTASSIUM CHLORIDE 40 MEQ: 750 CAPSULE, EXTENDED RELEASE ORAL at 13:57

## 2019-10-06 RX ADMIN — LEVOFLOXACIN 750 MG: 5 INJECTION, SOLUTION INTRAVENOUS at 01:53

## 2019-10-06 NOTE — PLAN OF CARE
Problem: Patient Care Overview  Goal: Plan of Care Review  Outcome: Ongoing (interventions implemented as appropriate)   10/06/19 9764   Coping/Psychosocial   Plan of Care Reviewed With patient   Plan of Care Review   Progress no change   OTHER   Outcome Summary pt up multiple times to have BM throughout night. Bolus x2 and IV abx plus IVF started. C diff neg and GI panel pending. no complaints of pain. Occult stool sent but pending. Colorectal surg to see this am. continue to monitor.       Problem: Bowel Disease, Inflammatory (Adult)  Goal: Signs and Symptoms of Listed Potential Problems Will be Absent, Minimized or Managed (Bowel Disease, Inflammatory)  Outcome: Ongoing (interventions implemented as appropriate)

## 2019-10-06 NOTE — CONSULTS
Clinical Nutrition   Reason For Visit: Identified at risk by screening criteria, Physician consult, Malnutrition Severity Assessment, MST score 2+, Unintentional weight loss    Patient Name: Rafa Dan  YOB: 1970  MRN: [id number]  Date of Encounter: 10/06/19 3:21 PM  Admission date: 10/5/2019      -Patient currently meets criteria for Severe, Chronic Malnutrition. See MSA note for details. MD may cosign MSA note if in agreement.    -If patient continues to have intractable diarrhea and weight loss while eating well during this admission (as he has been prior to admission), RD recommends initiating temporary, supplemental TPN or PPN until diarrhea is under control.    -Ordered daily 2pm snack - yogurt.      Nutrition Assessment     Admission Problem List:  Intractable diarrhea  Pancolitis  Melena  Hypokalemia  Hypoalbuminemia  Iron deficiency anemia      PMH: He  has a past medical history of Colitis.   PSxH: He  has no past surgical history on file.        Reported/Observed/Food/Nutrition Related History   RD notes GI consult pending.    Patient reports diarrhea began late June/early July of this year. He reports good/normal appetite and PO intake prior to admission. In fact, he states he eats more now than he did prior to becoming sick. He used to eat 1-2x daily and now he eats 2-3x daily. He states he does better with smaller frequent meals compared to 3 large meals. Does not drink any oral nutrition supplements at home. Denies food allergies and difficulty chewing/swallowing. MD has ordered Boost Plus with meals - pt requests strawberry flavor. Patient also requests yogurt daily for 2pm snack - pt understands that this will be placed in nourishment room fridge and labeled with his information. RD also informed patient of snacks from nourishment room.      Anthropometrics   Height: 71 in  Weight: 121 lbs (standing weight 10/6 per [facility] doc)  BMI: 16.9  BMI classification:  Underweight:<18.5kg/m2   UBW: 155 lbs (prior to late June/early July 2019 per pt)  IBW: 172 lbs (70% IBW)      Weight change: Unintentional weight loss of 34 lbs (21.9%) x 3 months.      Nutrition Focused Physical Exam     Subcutaneous fat:  Orbital Unable to determine at this time   Buccal Severe   Tricep Severe   Ribs- thoracic and lumbar region, lower back, midaxillary line Unable to determine at this time     Muscle:  Temple/temporalis Severe   Clavicle/acromion- pectoralis, deltoid Severe   Shoulder- deltoid Severe   Scapula- trapezius, latissimus dorsi Severe   Interosseous- dorsal hand Unable to determine at this time   Thigh- quadriceps Severe   Calf- gastrocnemius Severe       Labs reviewed   Labs reviewed: Yes    Medications reviewed   Medications reviewed: Yes  Pertinent: flagyl  GTT: NS @ 100 ml/hr    Current Nutrition Prescription   PO: Diet Regular  Oral Nutrition Supplement: Boost Plus 3x daily    Evaluation of Received Nutrient/Fluid Intake: 100% / 1 meal      Nutrition Diagnosis     Problem Malnutrition  (severe, chronic)   Etiology Intractable diarrhea, ?malabsorption   Signs/Symptoms Unintentional weight loss of 34 lbs (21.9%) x 3 months; Severe fat loss evident at buccal and triceps regions; Severe muscle wasting evident at temporalis, clavicle, acromion, deltoid, scapula, qudadricep, and calf regions       Intervention   Intervention: Follow treatment progress, Care plan reviewed, Advised available snacks, Interview for preferences, Encourage intake     -Ordered daily 2pm snack - yogurt.    -If patient continues to have intractable diarrhea and lose weight while eating well during this admission, RD recommends initiating temporary, supplemental TPN or PPN.    Goal:   General: Nutrition support treatment  PO: Establish PO    Additional goals: No further weight loss      Monitoring/Evaluation:     Monitoring/Evaluation: Per protocol, I&O, PO intake, Supplement intake, Pertinent labs, Weight, GI  status, Symptoms  Will Continue to follow per protocol  Mary Nunes RD  Time Spent: 45 min

## 2019-10-06 NOTE — PROGRESS NOTES
[facility] Medicine Services  ADMISSION FOLLOW-UP NOTE          Patient admitted after midnight, H&P by my partner performed earlier on today's date reviewed.  Interim findings, labs, and charting also reviewed.        [facility] Hospital Problem List has been managed and updated to include any new diagnoses:  Active Hospital Problems    Diagnosis  POA   • **Pancolitis (CMS/HCC) [K51.00]  Yes   • Rectal bleeding [K62.5]  Yes   • Unintentional weight loss [R63.4]  Yes   • Intractable diarrhea [R19.7]  Yes   • Severe protein-calorie malnutrition (CMS/HCC) [E43]  Yes   • Hypokalemia [E87.6]  Yes   • Hypoalbuminemia [E88.09]  Yes   • Iron deficiency anemia [D50.9]  Yes   • Thrombocytosis (CMS/HCC) [D47.3]  Yes      Resolved Hospital Problems   No resolved problems to display.         ADDITIONAL PLAN:  - detailed assessment and plan form admission reviewed  - give additional bolus today and adjust IVF with KCL.    -change Levaquin to Rocephin and continue Flagyl  -GI consult team to evaluate.    supplements for malnutrition.   -check labs tomorrow.    -check CBC this afternoon and transfuse if HGB drops      Electronically signed by Byron Weeks MD, 10/06/19, 12:21 PM.

## 2019-10-06 NOTE — CONSULTS
Per discussion with ED provider overnight, my best suggestion is to have the GI medicine team on board to evaluate/diagnose/treat his colitis. If surgical needs arise, please call our service and we will be more than happy to assist. I will place the GI consult. No face to face time was spent with this patient.

## 2019-10-06 NOTE — PROGRESS NOTES
Malnutrition Severity Assessment    Patient Name:  Rafa Dan  YOB: 1970  MRN: [id number]  Admit Date:  10/5/2019    Patient meets criteria for : Severe Malnutrition(Patient currently meets criteria for Severe, Chronic Malnutrition based on reported/documented weight hx, severe muscle wasting, and severe fat loss.)    Malnutrition Severity Assessment  Malnutrition Type: Chronic Disease - Related Malnutrition     Malnutrition Type (last 8 hours)      Malnutrition Severity Assessment     Row Name 10/06/19 1538       Malnutrition Severity Assessment    Malnutrition Type  Chronic Disease - Related Malnutrition    Row Name 10/06/19 1538       Unintentional Weight Loss     Unintentional Weight Loss   -- Severe: Unintentional weight loss of 34 lbs (21.9%) x 3 months    Row Name 10/06/19 1538       Muscle Loss    Loss of Muscle Mass Findings  Severe Severe muscle wasting evident at temporalis, clavicle, acromion, deltoid, scapula, qudadricep, and calf regions    Row Name 10/06/19 1538       Fat Loss    Subcutaneous Fat Loss Findings  Severe Severe fat loss evident at buccal and triceps regions    Row Name 10/06/19 1538       Criteria Met (Must meet criteria for severity in at least 2 of these categories: M Wasting, Fat Loss, Fluid, Secondary Signs, Wt. Status, Intake)    Patient meets criteria for   Severe Malnutrition Patient currently meets criteria for Severe, Chronic Malnutrition based on reported/documented weight hx, severe muscle wasting, and severe fat loss.          Electronically signed by:  Mary Nunes RD  10/06/19 3:39 PM

## 2019-10-06 NOTE — PROGRESS NOTES
Discharge Planning Assessment  [facility]     Patient Name: Rafa Dan  MRN: [id number]  Today's Date: 10/6/2019    Admit Date: 10/5/2019    Discharge Needs Assessment     Row Name 10/06/19 1209       Living Environment    Lives With  alone    Current Living Arrangements  home/apartment/condo    Primary Care Provided by  self    Provides Primary Care For  no one    Family Caregiver if Needed  friend(s)    Quality of Family Relationships  unable to assess    Able to Return to Prior Arrangements  yes    Living Arrangement Comments  Spoke with patient in room with permission in regards to discharge plan.  Pt resides in [facility] alone.  He has a friend that can stay with him or help him if needed.        Resource/Environmental Concerns    Resource/Environmental Concerns  other (see comments) No PCP and no medical insurance.        Transition Planning    Patient/Family Anticipates Transition to  home    Patient/Family Anticipated Services at Transition      Transportation Anticipated  car, drives self;family or friend will provide       Discharge Needs Assessment    Readmission Within the Last 30 Days  no previous admission in last 30 days    Concerns to be Addressed  discharge planning    Equipment Currently Used at Home  none    Equipment Needed After Discharge  none    Discharge Coordination/Progress  Spoke with pt in room with permission in regards to discharge plan.  Pt resides in [facility] alone and has a friend, Elizabeth, that can help him.  Pt has no insurance.  [facility] called and they plan to see pt.  Pt  would also like assistance in establishing appt. with PCP in the [facility] system prior to discharge. .  CM will cont to follow.         Discharge Plan     Row Name 10/06/19 3966       Plan    Plan  discharge plan    Patient/Family in Agreement with Plan  yes    Plan Comments  Plan is home and friend will assist him. Pt has no insurance and [facility] plans to see pt.  Pt would also  like a PCP appt. with someone in [facility] and will need to arrange prior to discharge.  CM will cont to follow.        Destination      No service coordination in this encounter.      Durable Medical Equipment      No service coordination in this encounter.      Dialysis/Infusion      No service coordination in this encounter.      Home Medical Care      No service coordination in this encounter.      Therapy      No service coordination in this encounter.      Community Resources      No service coordination in this encounter.          Demographic Summary     Row Name 10/06/19 1207       General Information    General Information Comments  Pt does not have a PCP and would like assistance.       Contact Information    Permission Granted to Share Info With      Contact Information Obtained for      Contact Information Comments  Elizabeth Iabrra(friend):  [phone]        Functional Status     Row Name 10/06/19 1208       Functional Status    Functional Status Comments  Pt is independent of ADLs        Psychosocial    No documentation.       Abuse/Neglect    No documentation.       Legal    No documentation.       Substance Abuse    No documentation.       Patient Forms    No documentation.           Caitie Meneses RN

## 2019-10-06 NOTE — CONSULTS
[facility]ology Consult    Referring Provider: Byron Weeks MD    PCP: Provider, No Known    Reason for Consultation: Colitis    Chief complaint: Intractable diarrhea    History of present illness:    Rafa Dan is a 49 y.o. male who is admitted with 4-month history of bloody watery diarrhea, both at day and night. There is associated 40# weight loss, despite good appetite. There is associated low-grade fever and night sweats. Denies abdominal pain or nausea. Symptoms not improved with an antibiotic.    Allergies:  Patient has no known allergies.    Scheduled Meds:    ceftriaxone 2 g Intravenous Q24H   lactobacillus acidophilus 1 capsule Oral Daily   metroNIDAZOLE 500 mg Intravenous Q8H   sodium chloride 10 mL Intravenous Q12H        Infusions:    sodium chloride 0.9 % with KCl 20 mEq 100 mL/hr Last Rate: 100 mL/hr (10/06/19 0944)       PRN Meds:  magnesium sulfate **OR** magnesium sulfate **OR** magnesium sulfate  •  ondansetron **OR** ondansetron  •  potassium & sodium phosphates **OR** potassium & sodium phosphates  •  potassium chloride **OR** potassium chloride **OR** potassium chloride  •  sodium chloride  •  sodium chloride    Home Meds:  No medications prior to admission.       ROS: Review of Systems   Constitutional: Positive for activity change, diaphoresis, fatigue, fever and unexpected weight change. Negative for appetite change and chills.   HENT: Negative.  Negative for mouth sores, nosebleeds and trouble swallowing.    Eyes: Negative.    Respiratory: Negative.  Negative for cough, choking, chest tightness, shortness of breath, wheezing and stridor.    Cardiovascular: Negative.  Negative for chest pain, palpitations and leg swelling.   Gastrointestinal: Positive for blood in stool and diarrhea. Negative for abdominal distention, abdominal pain, constipation, nausea and vomiting.   Endocrine: Negative.    Genitourinary: Negative.  Negative for difficulty urinating and dysuria.  "  Musculoskeletal: Negative.  Negative for arthralgias and back pain.   Skin: Positive for pallor. Negative for color change and rash.   Allergic/Immunologic: Negative.    Neurological: Negative.  Negative for tremors, seizures, syncope, weakness, light-headedness and headaches.   Hematological: Negative.  Negative for adenopathy. Does not bruise/bleed easily.   Psychiatric/Behavioral: Negative.  Negative for agitation and behavioral problems.       PAST MED HX:  Past Medical History:   Diagnosis Date   • Colitis        PAST SURG HX:  History reviewed. No pertinent surgical history.    FAM HX:  History reviewed. No pertinent family history.    SOC HX:  Social History     Socioeconomic History   • Marital status: Single     Spouse name: Not on file   • Number of children: Not on file   • Years of education: Not on file   • Highest education level: Not on file   Tobacco Use   • Smoking status: Never Smoker   • Smokeless tobacco: Never Used   Substance and Sexual Activity   • Alcohol use: No     Frequency: Never   • Drug use: No       PHYSICAL EXAM  BP 95/57 (BP Location: Left arm, Patient Position: Lying)   Pulse 85   Temp 97.5 °F (36.4 °C) (Oral)   Resp 16   Ht 180.3 cm (71\")   Wt 54.9 kg (121 lb)   SpO2 100%   BMI 16.88 kg/m²   Wt Readings from Last 3 Encounters:   10/06/19 54.9 kg (121 lb)   ,body mass index is 16.88 kg/m².  Physical Exam   Constitutional: He is oriented to person, place, and time. He appears well-developed. No distress.   Appears underweight and chronically ill.   Cardiovascular: Normal rate and regular rhythm.   Pulmonary/Chest: Effort normal and breath sounds normal. No stridor. No respiratory distress. He has no wheezes. He has no rales.   Abdominal: Soft. Bowel sounds are normal. He exhibits no distension and no mass. There is no tenderness. There is no rebound and no guarding.   Scaphoid   Genitourinary:   Genitourinary Comments: Deferred   Musculoskeletal: Normal range of motion. He " exhibits no edema.   Neurological: He is alert and oriented to person, place, and time.   Skin: Skin is warm and dry. Capillary refill takes less than 2 seconds.   Psychiatric: He has a normal mood and affect. His behavior is normal.   Nursing note and vitals reviewed.      Results Review:   I reviewed the patient's new clinical results.    Lab Results   Component Value Date    WBC 4.22 10/06/2019    HGB 6.6 (C) 10/06/2019    HGB 7.0 (L) 10/06/2019    HGB 8.2 (L) 10/05/2019    HCT 22.1 (L) 10/06/2019    MCV 87.4 10/06/2019     10/06/2019       No results found for: INR    Lab Results   Component Value Date    GLUCOSE 100 (H) 10/06/2019    BUN 7 10/06/2019    CREATININE 0.65 (L) 10/06/2019    EGFRIFNONA 130 10/06/2019    BCR 10.8 10/06/2019    CO2 23.0 10/06/2019    CALCIUM 6.9 (L) 10/06/2019    ALBUMIN 1.90 (L) 10/05/2019    ALKPHOS 76 10/05/2019    BILITOT 0.3 10/05/2019    ALT 15 10/05/2019    AST 16 10/05/2019      Ref. Range 10/5/2019 17:23   Iron Latest Ref Range: 59 - 158 mcg/dL 14 (L)   Ferritin Latest Ref Range: 30.00 - 400.00 ng/mL 47.07   Iron Saturation Latest Ref Range: 20 - 50 % 8 (L)   Transferrin Latest Ref Range: 200 - 360 mg/dL 125 (L)   TIBC Latest Ref Range: 298 - 536 mcg/dL 186 (L)   Folate Latest Ref Range: 4.78 - 24.20 ng/mL 6.44      Ref. Range 10/6/2019 03:30   Vitamin B-12 Latest Ref Range: 211 - 946 pg/mL 1,270 (H)      Ref. Range 10/6/2019 03:30   HIV-1/ HIV-2 Ab Latest Ref Range: Non-Reactive  Non-Reactive       C diff toxin PCR and  GI PCR stool panel are negative.    ASSESSMENTS/PLANS    Chronic colitis. Suspect IBD  Protein-losing colopathy.  Severe protein-calorie malnutrition.  Chronic blood loss anemia.  Diffuse fatty liver on CT, likely due to malnutrition and underweight.  Abnormal weight loss.    >> Celiac markers  >> Colonoscopy tomorrow    I discussed the patients findings and my recommendations with patient and nursing staff    Mark I. Brunner, MD  10/06/19  6:39 PM

## 2019-10-07 ENCOUNTER — ANESTHESIA (OUTPATIENT)
Dept: GASTROENTEROLOGY | Facility: HOSPITAL | Age: 49
End: 2019-10-07

## 2019-10-07 ENCOUNTER — ANESTHESIA EVENT (OUTPATIENT)
Dept: GASTROENTEROLOGY | Facility: HOSPITAL | Age: 49
End: 2019-10-07

## 2019-10-07 PROBLEM — R19.7 DIARRHEA: Status: ACTIVE | Noted: 2019-10-05

## 2019-10-07 PROBLEM — R63.4 WEIGHT LOSS: Status: ACTIVE | Noted: 2019-10-05

## 2019-10-07 PROBLEM — D50.0 IRON DEFICIENCY ANEMIA DUE TO CHRONIC BLOOD LOSS: Status: ACTIVE | Noted: 2019-10-05

## 2019-10-07 PROBLEM — D62 ACUTE BLOOD LOSS ANEMIA: Status: ACTIVE | Noted: 2019-10-07

## 2019-10-07 LAB
ABO + RH BLD: NORMAL
ANION GAP SERPL CALCULATED.3IONS-SCNC: 8 MMOL/L (ref 5–15)
BH BB BLOOD EXPIRATION DATE: NORMAL
BH BB BLOOD TYPE BARCODE: 600
BH BB DISPENSE STATUS: NORMAL
BH BB PRODUCT CODE: NORMAL
BH BB UNIT NUMBER: NORMAL
BUN BLD-MCNC: 5 MG/DL (ref 6–20)
BUN/CREAT SERPL: 7.7 (ref 7–25)
CALCIUM SPEC-SCNC: 6.9 MG/DL (ref 8.6–10.5)
CHLORIDE SERPL-SCNC: 108 MMOL/L (ref 98–107)
CO2 SERPL-SCNC: 21 MMOL/L (ref 22–29)
CREAT BLD-MCNC: 0.65 MG/DL (ref 0.76–1.27)
DEPRECATED RDW RBC AUTO: 56.2 FL (ref 37–54)
DEPRECATED RDW RBC AUTO: 58.4 FL (ref 37–54)
ERYTHROCYTE [DISTWIDTH] IN BLOOD BY AUTOMATED COUNT: 18.8 % (ref 12.3–15.4)
ERYTHROCYTE [DISTWIDTH] IN BLOOD BY AUTOMATED COUNT: 19.6 % (ref 12.3–15.4)
GFR SERPL CREATININE-BSD FRML MDRD: 130 ML/MIN/1.73
GLUCOSE BLD-MCNC: 79 MG/DL (ref 65–99)
HBV SURFACE AG SERPL QL IA: NORMAL
HCT VFR BLD AUTO: 28.6 % (ref 37.5–51)
HCT VFR BLD AUTO: 30.6 % (ref 37.5–51)
HGB BLD-MCNC: 8.5 G/DL (ref 13–17.7)
HGB BLD-MCNC: 9.2 G/DL (ref 13–17.7)
IGA1 MFR SER: 315 MG/DL (ref 70–400)
MAGNESIUM SERPL-MCNC: 2.2 MG/DL (ref 1.6–2.6)
MCH RBC QN AUTO: 25.4 PG (ref 26.6–33)
MCH RBC QN AUTO: 25.8 PG (ref 26.6–33)
MCHC RBC AUTO-ENTMCNC: 29.7 G/DL (ref 31.5–35.7)
MCHC RBC AUTO-ENTMCNC: 30.1 G/DL (ref 31.5–35.7)
MCV RBC AUTO: 85.6 FL (ref 79–97)
MCV RBC AUTO: 86 FL (ref 79–97)
PLATELET # BLD AUTO: 445 10*3/MM3 (ref 140–450)
PLATELET # BLD AUTO: 445 10*3/MM3 (ref 140–450)
PMV BLD AUTO: 8.1 FL (ref 6–12)
PMV BLD AUTO: 8.5 FL (ref 6–12)
POTASSIUM BLD-SCNC: 3.9 MMOL/L (ref 3.5–5.2)
POTASSIUM BLD-SCNC: 4.2 MMOL/L (ref 3.5–5.2)
RBC # BLD AUTO: 3.34 10*6/MM3 (ref 4.14–5.8)
RBC # BLD AUTO: 3.56 10*6/MM3 (ref 4.14–5.8)
SODIUM BLD-SCNC: 137 MMOL/L (ref 136–145)
UNIT  ABO: NORMAL
UNIT  RH: NORMAL
WBC NRBC COR # BLD: 3.65 10*3/MM3 (ref 3.4–10.8)
WBC NRBC COR # BLD: 4.42 10*3/MM3 (ref 3.4–10.8)

## 2019-10-07 PROCEDURE — 82784 ASSAY IGA/IGD/IGG/IGM EACH: CPT | Performed by: INTERNAL MEDICINE

## 2019-10-07 PROCEDURE — 25010000003 LIDOCAINE 1 % SOLUTION: Performed by: NURSE ANESTHETIST, CERTIFIED REGISTERED

## 2019-10-07 PROCEDURE — 25010000002 PHENYLEPHRINE PER 1 ML: Performed by: NURSE ANESTHETIST, CERTIFIED REGISTERED

## 2019-10-07 PROCEDURE — 88305 TISSUE EXAM BY PATHOLOGIST: CPT | Performed by: INTERNAL MEDICINE

## 2019-10-07 PROCEDURE — 25010000003 HYDROCORTISONE SOD SUCCINATE PF 250 MG RECONSTITUTED SOLUTION 1 EACH VIAL: Performed by: INTERNAL MEDICINE

## 2019-10-07 PROCEDURE — 0DBF8ZX EXCISION OF RIGHT LARGE INTESTINE, VIA NATURAL OR ARTIFICIAL OPENING ENDOSCOPIC, DIAGNOSTIC: ICD-10-PCS | Performed by: INTERNAL MEDICINE

## 2019-10-07 PROCEDURE — 25010000002 CEFTRIAXONE PER 250 MG: Performed by: INTERNAL MEDICINE

## 2019-10-07 PROCEDURE — 80048 BASIC METABOLIC PNL TOTAL CA: CPT | Performed by: INTERNAL MEDICINE

## 2019-10-07 PROCEDURE — 99233 SBSQ HOSP IP/OBS HIGH 50: CPT | Performed by: INTERNAL MEDICINE

## 2019-10-07 PROCEDURE — 85027 COMPLETE CBC AUTOMATED: CPT | Performed by: INTERNAL MEDICINE

## 2019-10-07 PROCEDURE — 25010000002 PROPOFOL 10 MG/ML EMULSION: Performed by: NURSE ANESTHETIST, CERTIFIED REGISTERED

## 2019-10-07 PROCEDURE — 83516 IMMUNOASSAY NONANTIBODY: CPT | Performed by: INTERNAL MEDICINE

## 2019-10-07 PROCEDURE — 84132 ASSAY OF SERUM POTASSIUM: CPT | Performed by: INTERNAL MEDICINE

## 2019-10-07 PROCEDURE — 25810000003 SODIUM CHLORIDE 0.9 % WITH KCL 20 MEQ 20-0.9 MEQ/L-% SOLUTION: Performed by: INTERNAL MEDICINE

## 2019-10-07 PROCEDURE — 0DBG8ZX EXCISION OF LEFT LARGE INTESTINE, VIA NATURAL OR ARTIFICIAL OPENING ENDOSCOPIC, DIAGNOSTIC: ICD-10-PCS | Performed by: INTERNAL MEDICINE

## 2019-10-07 PROCEDURE — 83735 ASSAY OF MAGNESIUM: CPT | Performed by: INTERNAL MEDICINE

## 2019-10-07 RX ORDER — LABETALOL HYDROCHLORIDE 5 MG/ML
5 INJECTION, SOLUTION INTRAVENOUS
Status: DISCONTINUED | OUTPATIENT
Start: 2019-10-07 | End: 2019-10-07 | Stop reason: HOSPADM

## 2019-10-07 RX ORDER — SULFASALAZINE 500 MG/1
500 TABLET ORAL EVERY 8 HOURS SCHEDULED
Status: DISCONTINUED | OUTPATIENT
Start: 2019-10-07 | End: 2019-10-09

## 2019-10-07 RX ORDER — FAMOTIDINE 10 MG/ML
20 INJECTION, SOLUTION INTRAVENOUS ONCE
Status: COMPLETED | OUTPATIENT
Start: 2019-10-07 | End: 2019-10-07

## 2019-10-07 RX ORDER — PROMETHAZINE HYDROCHLORIDE 25 MG/1
25 TABLET ORAL ONCE AS NEEDED
Status: DISCONTINUED | OUTPATIENT
Start: 2019-10-07 | End: 2019-10-07 | Stop reason: HOSPADM

## 2019-10-07 RX ORDER — ACETAMINOPHEN 325 MG/1
650 TABLET ORAL EVERY 6 HOURS PRN
Status: DISCONTINUED | OUTPATIENT
Start: 2019-10-07 | End: 2019-10-11 | Stop reason: HOSPADM

## 2019-10-07 RX ORDER — LIDOCAINE HYDROCHLORIDE 10 MG/ML
INJECTION, SOLUTION INFILTRATION; PERINEURAL AS NEEDED
Status: DISCONTINUED | OUTPATIENT
Start: 2019-10-07 | End: 2019-10-07 | Stop reason: SURG

## 2019-10-07 RX ORDER — PROMETHAZINE HYDROCHLORIDE 25 MG/ML
6.25 INJECTION, SOLUTION INTRAMUSCULAR; INTRAVENOUS ONCE AS NEEDED
Status: DISCONTINUED | OUTPATIENT
Start: 2019-10-07 | End: 2019-10-07 | Stop reason: HOSPADM

## 2019-10-07 RX ORDER — IPRATROPIUM BROMIDE AND ALBUTEROL SULFATE 2.5; .5 MG/3ML; MG/3ML
3 SOLUTION RESPIRATORY (INHALATION) ONCE AS NEEDED
Status: DISCONTINUED | OUTPATIENT
Start: 2019-10-07 | End: 2019-10-07 | Stop reason: HOSPADM

## 2019-10-07 RX ORDER — ONDANSETRON 2 MG/ML
4 INJECTION INTRAMUSCULAR; INTRAVENOUS ONCE AS NEEDED
Status: DISCONTINUED | OUTPATIENT
Start: 2019-10-07 | End: 2019-10-07 | Stop reason: HOSPADM

## 2019-10-07 RX ORDER — SODIUM CHLORIDE, SODIUM LACTATE, POTASSIUM CHLORIDE, CALCIUM CHLORIDE 600; 310; 30; 20 MG/100ML; MG/100ML; MG/100ML; MG/100ML
INJECTION, SOLUTION INTRAVENOUS CONTINUOUS PRN
Status: DISCONTINUED | OUTPATIENT
Start: 2019-10-07 | End: 2019-10-07 | Stop reason: SURG

## 2019-10-07 RX ORDER — PROMETHAZINE HYDROCHLORIDE 25 MG/1
25 SUPPOSITORY RECTAL ONCE AS NEEDED
Status: DISCONTINUED | OUTPATIENT
Start: 2019-10-07 | End: 2019-10-07 | Stop reason: HOSPADM

## 2019-10-07 RX ORDER — PROPOFOL 10 MG/ML
VIAL (ML) INTRAVENOUS AS NEEDED
Status: DISCONTINUED | OUTPATIENT
Start: 2019-10-07 | End: 2019-10-07 | Stop reason: SURG

## 2019-10-07 RX ORDER — SODIUM CHLORIDE, SODIUM LACTATE, POTASSIUM CHLORIDE, CALCIUM CHLORIDE 600; 310; 30; 20 MG/100ML; MG/100ML; MG/100ML; MG/100ML
20 INJECTION, SOLUTION INTRAVENOUS CONTINUOUS
Status: DISCONTINUED | OUTPATIENT
Start: 2019-10-07 | End: 2019-10-08

## 2019-10-07 RX ORDER — HYDRALAZINE HYDROCHLORIDE 20 MG/ML
5 INJECTION INTRAMUSCULAR; INTRAVENOUS
Status: DISCONTINUED | OUTPATIENT
Start: 2019-10-07 | End: 2019-10-07 | Stop reason: HOSPADM

## 2019-10-07 RX ADMIN — PROPOFOL 50 MG: 10 INJECTION, EMULSION INTRAVENOUS at 12:58

## 2019-10-07 RX ADMIN — SULFASALAZINE 500 MG: 500 TABLET ORAL at 20:43

## 2019-10-07 RX ADMIN — METRONIDAZOLE 500 MG: 500 INJECTION, SOLUTION INTRAVENOUS at 04:04

## 2019-10-07 RX ADMIN — PROPOFOL 50 MG: 10 INJECTION, EMULSION INTRAVENOUS at 13:04

## 2019-10-07 RX ADMIN — SODIUM CHLORIDE, POTASSIUM CHLORIDE, SODIUM LACTATE AND CALCIUM CHLORIDE 20 ML/HR: 600; 310; 30; 20 INJECTION, SOLUTION INTRAVENOUS at 12:22

## 2019-10-07 RX ADMIN — POTASSIUM CHLORIDE AND SODIUM CHLORIDE 75 ML/HR: 900; 150 INJECTION, SOLUTION INTRAVENOUS at 21:10

## 2019-10-07 RX ADMIN — FAMOTIDINE 20 MG: 10 INJECTION, SOLUTION INTRAVENOUS at 12:22

## 2019-10-07 RX ADMIN — PROPOFOL 50 MG: 10 INJECTION, EMULSION INTRAVENOUS at 12:51

## 2019-10-07 RX ADMIN — Medication 1 CAPSULE: at 08:26

## 2019-10-07 RX ADMIN — LIDOCAINE HYDROCHLORIDE 50 MG: 10 INJECTION, SOLUTION INFILTRATION; PERINEURAL at 12:51

## 2019-10-07 RX ADMIN — SODIUM CHLORIDE, PRESERVATIVE FREE 10 ML: 5 INJECTION INTRAVENOUS at 08:26

## 2019-10-07 RX ADMIN — POLYETHYLENE GLYCOL 3350, SODIUM SULFATE, SODIUM CHLORIDE, POTASSIUM CHLORIDE, ASCORBIC ACID, SODIUM ASCORBATE 1000 ML: KIT at 04:01

## 2019-10-07 RX ADMIN — SULFASALAZINE 500 MG: 500 TABLET ORAL at 17:16

## 2019-10-07 RX ADMIN — CEFTRIAXONE 2 G: 2 INJECTION, POWDER, FOR SOLUTION INTRAMUSCULAR; INTRAVENOUS at 14:38

## 2019-10-07 RX ADMIN — METRONIDAZOLE 500 MG: 500 INJECTION, SOLUTION INTRAVENOUS at 17:17

## 2019-10-07 RX ADMIN — SODIUM CHLORIDE, POTASSIUM CHLORIDE, SODIUM LACTATE AND CALCIUM CHLORIDE: 600; 310; 30; 20 INJECTION, SOLUTION INTRAVENOUS at 12:45

## 2019-10-07 RX ADMIN — SODIUM CHLORIDE 250 MG: 9 INJECTION, SOLUTION INTRAVENOUS at 15:28

## 2019-10-07 RX ADMIN — METRONIDAZOLE 500 MG: 500 INJECTION, SOLUTION INTRAVENOUS at 10:23

## 2019-10-07 RX ADMIN — PHENYLEPHRINE HYDROCHLORIDE 100 MCG: 10 INJECTION INTRAVENOUS at 13:17

## 2019-10-07 NOTE — ANESTHESIA PREPROCEDURE EVALUATION
Anesthesia Evaluation     Patient summary reviewed and Nursing notes reviewed   NPO Solid Status: > 8 hours  NPO Liquid Status: > 8 hours           Airway   Mallampati: I  TM distance: >3 FB  Neck ROM: full  No difficulty expected  Dental    (+) edentulous    Pulmonary    (+) a smoker,   (-) COPD, asthma, shortness of breath, recent URI  Cardiovascular     (-) hypertension, past MI, dysrhythmias, angina, hyperlipidemia      Neuro/Psych  (-) seizures, CVA  GI/Hepatic/Renal/Endo    (+)  GI bleeding,   (-) liver disease, no renal disease, diabetes, hypothyroidism    Musculoskeletal     Abdominal    Substance History      OB/GYN          Other                        Anesthesia Plan    ASA 2     general     intravenous induction   Anesthetic plan, all risks, benefits, and alternatives have been provided, discussed and informed consent has been obtained with: patient.    Plan discussed with CRNA.

## 2019-10-07 NOTE — PLAN OF CARE
Problem: Patient Care Overview  Goal: Plan of Care Review  Outcome: Ongoing (interventions implemented as appropriate)   10/07/19 1517   Coping/Psychosocial   Plan of Care Reviewed With patient   Plan of Care Review   Progress improving   OTHER   Outcome Summary VSS and NSR on telemetry. Room air. Colonoscopy performed today and tolerated well. No complaints of pain throughout shift. Will continue to monitor.        Problem: Bowel Disease, Inflammatory (Adult)  Goal: Signs and Symptoms of Listed Potential Problems Will be Absent, Minimized or Managed (Bowel Disease, Inflammatory)  Outcome: Ongoing (interventions implemented as appropriate)   10/07/19 1517   Goal/Outcome Evaluation   Problems Assessed (Inflammatory Bowel Disease) all   Problems Present (Inflammatory Bowel) undernutrition

## 2019-10-07 NOTE — ANESTHESIA POSTPROCEDURE EVALUATION
Patient: Rafa Dan    Procedure Summary     Date:  10/07/19 Room / Location:   [facility]    Anesthesia Start:  1245 Anesthesia Stop:      Procedure:  COLONOSCOPY (N/A ) Diagnosis:       Pancolitis (CMS/HCC)      Weight loss      Diarrhea, unspecified type      Iron deficiency anemia due to chronic blood loss      Rectal bleeding      (Pancolitis (CMS/HCC) [K51.00])      (Weight loss [R63.4])      (Diarrhea, unspecified type [R19.7])      (Iron deficiency anemia due to chronic blood loss [D50.0])      (Rectal bleeding [K62.5])    Surgeon:  Brunner, Mark I, MD Provider:  Abdirashid Rai MD    Anesthesia Type:  general ASA Status:  2          Anesthesia Type: general  Last vitals  BP   97/62 (10/07/19 1317)   Temp   97.3 °F (36.3 °C) (10/07/19 1317)   Pulse   88 (10/07/19 1317)   Resp   16 (10/07/19 1317)     SpO2   100 % (10/07/19 1317)     Anesthesia Post Evaluation

## 2019-10-07 NOTE — PLAN OF CARE
Problem: Patient Care Overview  Goal: Plan of Care Review  Outcome: Ongoing (interventions implemented as appropriate)   10/07/19 0513   Coping/Psychosocial   Plan of Care Reviewed With patient   OTHER   Outcome Summary Pts VSS on RA. MOVIPREP x2 completed, up to bedside multiple times, BM clear. Scheduled for colonscopy 9/7/2019 (Dr. Brunner), Consent is signed and in chart. NPO since midnight. Completed blood transfusion at 2300. Will continue to monitor labs.

## 2019-10-07 NOTE — PROGRESS NOTES
[facility] Medicine Services  PROGRESS NOTE    Patient Name: Rafa Dan  : 1970  MRN: [id number]    Date of Admission: 10/5/2019  Primary Care Physician: Provider, No Known    Subjective   Subjective     CC:  Follow-up colitis and bleeding    HPI:  Patient states he tolerated his bowel prep well last night.  He states his bleeding has now stopped.  He says he feels relatively good today and is awaiting colonoscopy.  He denies any specific abdominal pain at this time.  He states he tolerated his transfusion without issues.    Review of Systems  No current fevers or chills  No current shortness of breath or cough  No current nausea, vomiting, or diarrhea  No current chest pain or palpitations    Objective   Objective     Vital Signs:   Temp:  [97.2 °F (36.2 °C)-98.4 °F (36.9 °C)] 97.6 °F (36.4 °C)  Heart Rate:  [] 87  Resp:  [16-18] 18  BP: ()/(46-80) 102/62        Physical Exam:  Constitutional:Awake, alert  HENT: NCAT, mucous membranes moist, neck supple  Respiratory: Clear to auscultation bilaterally, respiratory effort normal, nonlabored breathing   Cardiovascular: RRR, normal radial pulses  Gastrointestinal: Positive bowel sounds, soft, nontender, nondistended  Musculoskeletal: Thin and somewhat debilitated in appearance, some muscle wasting noted, no lower extremity edema, BMI 17  Psychiatric: Appropriate affect, cooperative, conversational  Neurologic: No slurred speech or facial droop, follows commands, oriented  Skin: No rashes or jaundice    Results Reviewed:    Results from last 7 days   Lab Units 10/07/19  0341 10/06/19  1601 10/06/19  0330 10/05/19  1723   WBC 10*3/mm3 3.65 4.22 3.19* 4.44   HEMOGLOBIN g/dL 8.5* 6.6* 7.0* 8.2*   HEMATOCRIT % 28.6* 22.1* 23.3* 26.9*   PLATELETS 10*3/mm3 445 403 408 483*   PROCALCITONIN ng/mL  --   --   --  0.10     Results from last 7 days   Lab Units 10/07/19  0341 10/07/19  0012 10/06/19  0330 10/05/19  1723    SODIUM mmol/L 137  --  133* 132*   POTASSIUM mmol/L 4.2 3.9 3.0* 3.3*   CHLORIDE mmol/L 108*  --  102 99   CO2 mmol/L 21.0*  --  23.0 23.0   BUN mg/dL 5*  --  7 9   CREATININE mg/dL 0.65*  --  0.65* 0.66*   GLUCOSE mg/dL 79  --  100* 105*   CALCIUM mg/dL 6.9*  --  6.9* 7.8*   ALT (SGPT) U/L  --   --   --  15   AST (SGOT) U/L  --   --   --  16     Estimated Creatinine Clearance: 106.9 mL/min (A) (by C-G formula based on SCr of 0.65 mg/dL (L)).    Microbiology Results Abnormal     Procedure Component Value - Date/Time    Blood Culture - Blood, Arm, Right [[id number]] Collected:  10/06/19 0001    Lab Status:  Preliminary result Specimen:  Blood from Arm, Right Updated:  10/07/19 0225     Blood Culture No growth at 24 hours    Blood Culture - Blood, Hand, Right [[id number]] Collected:  10/06/19 0005    Lab Status:  Preliminary result Specimen:  Blood from Hand, Right Updated:  10/07/19 0225     Blood Culture No growth at 24 hours    Gastrointestinal Panel, PCR - Stool, Per Rectum [[id number]]  (Normal) Collected:  10/05/19 1848    Lab Status:  Final result Specimen:  Stool from Per Rectum Updated:  10/06/19 1456     Campylobacter Not Detected     Plesiomonas shigelloides Not Detected     Salmonella Not Detected     Vibrio Not Detected     Vibrio cholerae Not Detected     Yersinia enterocolitica Not Detected     Enteroaggregative E. coli (EAEC) Not Detected     Enteropathogenic E. coli (EPEC) Not Detected     Enterotoxigenic E. coli (ETEC) lt/st Not Detected     Shiga-like toxin-producing E. coli (STEC) stx1/stx2 Not Detected     E. coli O157 Not Detected     Shigella/Enteroinvasive E. coli (EIEC) Not Detected     Cryptosporidium Not Detected     Cyclospora cayetanensis Not Detected     Entamoeba histolytica Not Detected     Giardia lamblia Not Detected     Adenovirus F40/41 Not Detected     Astrovirus Not Detected     Norovirus GI/GII Not Detected     Rotavirus A Not Detected     Sapovirus (I, II, IV or V) Not Detected     Narrative:       If Aeromonas, Staphylococcus aureus or Bacillus cereus are suspected, please order [id number]: Stool Culture, Aeromonas, S aureus, B Cereus.    Clostridium Difficile Toxin - Stool, Per Rectum [[id number]] Collected:  10/05/19 1848    Lab Status:  Final result Specimen:  Stool from Per Rectum Updated:  10/05/19 2054    Narrative:       The following orders were created for panel order Clostridium Difficile Toxin - Stool, Per Rectum.  Procedure                               Abnormality         Status                     ---------                               -----------         ------                     Clostridium Difficile To...[[id number]]  Normal              Final result                 Please view results for these tests on the individual orders.    Clostridium Difficile Toxin, PCR - Stool, Per Rectum [[id number]]  (Normal) Collected:  10/05/19 1848    Lab Status:  Final result Specimen:  Stool from Per Rectum Updated:  10/05/19 2054     C. Difficile Toxins by PCR Not Detected    Narrative:       Performance characteristics of test not established for patients <2 years of age.  Negative for Toxigenic C. Difficile          Imaging Results (last 24 hours)     Procedure Component Value Units Date/Time    CT Abdomen Pelvis Without Contrast [[id number]] Collected:  10/05/19 1917     Updated:  10/06/19 1127    Narrative:       EXAMINATION: CT ABDOMEN/PELVIS WO CONTRAST - 10/05/2019      INDICATION: Abdominal pain, diarrhea.      TECHNIQUE: Unenhanced CT imaging of the abdomen and pelvis was performed  with additional coronal and sagittal reformatted imaging provided for  review.     The radiation dose reduction device was turned on for each scan per the  ALARA (As Low as Reasonably Achievable) protocol.     COMPARISON: None.      FINDINGS: Visualized lower lungs do not demonstrate evidence for acute  abnormality. The heart is unremarkable as visualized. Diffuse hepatic  steatosis is noted. Gallbladder  is unremarkable in appearance. The  spleen does not demonstrate abnormality. The pancreas is unremarkable.  The adrenal glands demonstrate mild thickening bilaterally and are  otherwise nonspecific. Likely superior pole renal cyst identified. The  kidneys are symmetric without evidence of hydronephrosis. Urinary  bladder is partially decompressed and otherwise unremarkable. The  prostate gland is prominent in size measuring up to 4.3 cm and  demonstrates calcification. Please note the appendix is not well  visualized, although no obvious appendicitis is identified.     No free intraperitoneal air is appreciated. The stomach is partially  decompressed and otherwise unremarkable. Fluid-filled small bowel is  identified suggesting an enteritis with additional diffuse pan colonic  wall thickening with pan colonic inflammation identified without  convincing CT evidence of pneumatosis or portal venous gas. No air is  appreciated within the liver. No pericolonic abscess appreciated. The  lack of oral and intravenous contrast limits evaluation particularly for  pericolonic abscess.      The visualized osseous structures appear intact. Thoracolumbar  degenerative changes are identified.       Impression:          There is diffuse pancolonic inflammation without evidence of pericolonic  abscess or free air.     Nonspecific prominent fluid-filled loops of small bowel suggesting  additional superimposed enteritis.     What is felt to represent the appendix within the right lower quadrant  is inflamed and measures up to 8 mm in thickness, although this is in  the setting of extensive pericecal inflammation and is likely reactive  appendiceal inflammation given the extensive surrounding and adjacent  colitis noted.     DICTATED:   10/05/2019  EDITED/ls :   10/06/2019                   I have reviewed the medications:  Scheduled Meds:    ceftriaxone 2 g Intravenous Q24H   lactobacillus acidophilus 1 capsule Oral Daily    metroNIDAZOLE 500 mg Intravenous Q8H   sodium chloride 10 mL Intravenous Q12H     Continuous Infusions:    sodium chloride 0.9 % with KCl 20 mEq 100 mL/hr Last Rate: 100 mL/hr (10/06/19 0944)     PRN Meds:.magnesium sulfate **OR** magnesium sulfate **OR** magnesium sulfate  •  ondansetron **OR** ondansetron  •  potassium & sodium phosphates **OR** potassium & sodium phosphates  •  potassium chloride **OR** potassium chloride **OR** potassium chloride  •  sodium chloride  •  sodium chloride      Assessment/Plan   Assessment / Plan     Active Hospital Problems    Diagnosis  POA   • **Pancolitis (CMS/HCC) [K51.00]  Yes   • Acute blood loss anemia [D62]  Yes   • Rectal bleeding [K62.5]  Yes   • Unintentional weight loss [R63.4]  Yes   • Intractable diarrhea [R19.7]  Yes   • Severe protein-calorie malnutrition (CMS/HCC) [E43]  Yes   • Hypokalemia [E87.6]  Yes   • Hypoalbuminemia [E88.09]  Yes   • Iron deficiency anemia [D50.9]  Yes   • Thrombocytosis (CMS/HCC) [D47.3]  Yes   • Weight loss [R63.4]  Unknown   • Diarrhea [R19.7]  Unknown   • Iron deficiency anemia due to chronic blood loss [D50.0]  Unknown      Resolved Hospital Problems   No resolved problems to display.        Brief Hospital Course to date:  Rafa Dan is a 49 y.o. male with no known medical problems who presents to the ED due to unintentional weight loss and intractable diarrhea found to have pancolitis.    Pancolitis, hypotension:  Presented with intractable diarrhea.  Currently on ceftriaxone and Flagyl.  Findings concerning for inflammatory bowel disease.  Cultures pending.  Intermittent hypotension noted.  On IV fluids.    Rectal bleeding, acute blood loss anemia, iron deficiency anemia, thrombocytosis:  Bleeding now resolved.  Required 1 unit transfusion on 10/6.  Trending hemoglobin.  Plan is for colonoscopy today.    Unintentional weight loss, hypoalbuminemia, severe protein calorie malnutrition, underweight BMI 17:  Possibly due to  undiagnosed inflammatory bowel disease or poor nutritional intake.  Encouraging supplements.  Could consider TPN if unable to get adequate calories.    Hypokalemia:  Replaced and normalized.    Thrombocytosis: Likely related to inflammation or infection.  Monitor.    Hyponatremia: Resolved with IV fluids.  Monitor intermittently    Continue IV fluid.  Rate adjusted.  Case discussed with gastroenterologist who is planning on colonoscopy today.  Plan to follow-up on results of colonoscopy.  Continue antibiotics for now.  Continue to monitor blood pressure as blood pressure low.  Encourage nutrition.  Case discussed with colorectal surgery aware of patient however requests GI work-up first and can be reconsulted if surgical intervention is required.  Trend hemoglobin.  Recheck this afternoon and tomorrow morning and transfuse further if needed.  Bleeding has now stopped as per above.    DVT Prophylaxis: Mechanical and encourage ambulation in the setting of bleeding    Disposition: Location likely home when improved    CODE STATUS:   Code Status and Medical Interventions:   Ordered at: 10/06/19 0114     Level Of Support Discussed With:    Patient     Code Status:    CPR     Medical Interventions (Level of Support Prior to Arrest):    Full         Electronically signed by Byron Weeks MD, 10/07/19, 8:55 AM.

## 2019-10-08 LAB
ANION GAP SERPL CALCULATED.3IONS-SCNC: 6 MMOL/L (ref 5–15)
BUN BLD-MCNC: 7 MG/DL (ref 6–20)
BUN/CREAT SERPL: 11.9 (ref 7–25)
CALCIUM SPEC-SCNC: 6.9 MG/DL (ref 8.6–10.5)
CHLORIDE SERPL-SCNC: 109 MMOL/L (ref 98–107)
CO2 SERPL-SCNC: 21 MMOL/L (ref 22–29)
CREAT BLD-MCNC: 0.59 MG/DL (ref 0.76–1.27)
CYTO UR: NORMAL
DEPRECATED RDW RBC AUTO: 58.6 FL (ref 37–54)
ERYTHROCYTE [DISTWIDTH] IN BLOOD BY AUTOMATED COUNT: 19.6 % (ref 12.3–15.4)
GFR SERPL CREATININE-BSD FRML MDRD: 145 ML/MIN/1.73
GLUCOSE BLD-MCNC: 136 MG/DL (ref 65–99)
HCT VFR BLD AUTO: 26.8 % (ref 37.5–51)
HGB BLD-MCNC: 8.1 G/DL (ref 13–17.7)
LAB AP CASE REPORT: NORMAL
LAB AP CLINICAL INFORMATION: NORMAL
LAB AP DIAGNOSIS COMMENT: NORMAL
MCH RBC QN AUTO: 25.8 PG (ref 26.6–33)
MCHC RBC AUTO-ENTMCNC: 30.2 G/DL (ref 31.5–35.7)
MCV RBC AUTO: 85.4 FL (ref 79–97)
PATH REPORT.FINAL DX SPEC: NORMAL
PATH REPORT.GROSS SPEC: NORMAL
PLATELET # BLD AUTO: 435 10*3/MM3 (ref 140–450)
PMV BLD AUTO: 8.5 FL (ref 6–12)
POTASSIUM BLD-SCNC: 3.8 MMOL/L (ref 3.5–5.2)
RBC # BLD AUTO: 3.14 10*6/MM3 (ref 4.14–5.8)
SODIUM BLD-SCNC: 136 MMOL/L (ref 136–145)
TTG IGA SER-ACNC: <2 U/ML (ref 0–3)
WBC NRBC COR # BLD: 5.3 10*3/MM3 (ref 3.4–10.8)

## 2019-10-08 PROCEDURE — 99232 SBSQ HOSP IP/OBS MODERATE 35: CPT | Performed by: PHYSICIAN ASSISTANT

## 2019-10-08 PROCEDURE — 85027 COMPLETE CBC AUTOMATED: CPT | Performed by: INTERNAL MEDICINE

## 2019-10-08 PROCEDURE — 25010000003 HYDROCORTISONE SOD SUCCINATE PF 250 MG RECONSTITUTED SOLUTION 1 EACH VIAL: Performed by: INTERNAL MEDICINE

## 2019-10-08 PROCEDURE — 80048 BASIC METABOLIC PNL TOTAL CA: CPT | Performed by: INTERNAL MEDICINE

## 2019-10-08 PROCEDURE — 87385 HISTOPLASMA CAPSUL AG IA: CPT | Performed by: INTERNAL MEDICINE

## 2019-10-08 PROCEDURE — 25810000003 SODIUM CHLORIDE 0.9 % WITH KCL 20 MEQ 20-0.9 MEQ/L-% SOLUTION: Performed by: INTERNAL MEDICINE

## 2019-10-08 PROCEDURE — 86481 TB AG RESPONSE T-CELL SUSP: CPT | Performed by: INTERNAL MEDICINE

## 2019-10-08 RX ORDER — FERROUS SULFATE 325(65) MG
325 TABLET ORAL
Status: DISCONTINUED | OUTPATIENT
Start: 2019-10-09 | End: 2019-10-11 | Stop reason: HOSPADM

## 2019-10-08 RX ORDER — FOLIC ACID 1 MG/1
1 TABLET ORAL DAILY
Status: DISCONTINUED | OUTPATIENT
Start: 2019-10-08 | End: 2019-10-11 | Stop reason: HOSPADM

## 2019-10-08 RX ADMIN — SULFASALAZINE 500 MG: 500 TABLET ORAL at 14:15

## 2019-10-08 RX ADMIN — FOLIC ACID 1 MG: 1 TABLET ORAL at 10:25

## 2019-10-08 RX ADMIN — SULFASALAZINE 500 MG: 500 TABLET ORAL at 05:54

## 2019-10-08 RX ADMIN — Medication 1 CAPSULE: at 08:56

## 2019-10-08 RX ADMIN — SODIUM CHLORIDE, PRESERVATIVE FREE 10 ML: 5 INJECTION INTRAVENOUS at 08:56

## 2019-10-08 RX ADMIN — METRONIDAZOLE 500 MG: 500 INJECTION, SOLUTION INTRAVENOUS at 03:15

## 2019-10-08 RX ADMIN — SODIUM CHLORIDE, PRESERVATIVE FREE 10 ML: 5 INJECTION INTRAVENOUS at 21:21

## 2019-10-08 RX ADMIN — SODIUM CHLORIDE 250 MG: 9 INJECTION, SOLUTION INTRAVENOUS at 18:23

## 2019-10-08 RX ADMIN — SULFASALAZINE 500 MG: 500 TABLET ORAL at 21:20

## 2019-10-08 RX ADMIN — POTASSIUM CHLORIDE AND SODIUM CHLORIDE 75 ML/HR: 900; 150 INJECTION, SOLUTION INTRAVENOUS at 10:12

## 2019-10-08 NOTE — PROGRESS NOTES
[facility] Medicine Services  PROGRESS NOTE    Patient Name: Rafa Dan  : 1970  MRN: [id number]    Date of Admission: 10/5/2019  Primary Care Physician: Provider, No Known    Subjective     CC: f/u colitis and bleeding    HPI:  Doing well. Hungry. No abdominal pain, nausea or vomiting. Ongoing diarrhea - 7 loose stools so far today. We discussed colonoscopy results.     Review of Systems  Gen- No fevers, chills  CV- No chest pain, palpitations  Resp- No cough, dyspnea  GI- No N/V or abd pain, (+) diarrhea    Objective     Vital Signs:   Temp:  [97.2 °F (36.2 °C)-98.8 °F (37.1 °C)] 97.8 °F (36.6 °C)  Heart Rate:  [] 112  Resp:  [16-20] 16  BP: ()/(51-70) 100/63        Physical Exam:  Constitutional: No acute distress, awake, alert and conversant. Sitting up in bed. Thin, some muscle wasting noted   HENT: NCAT, mucous membranes moist  Respiratory: Clear to auscultation bilaterally, respiratory effort normal on room air   Cardiovascular: RRR, no murmurs, rubs, or gallops, palpable pedal pulses bilaterally  Gastrointestinal: Positive bowel sounds, soft, nontender, nondistended  Musculoskeletal: No bilateral ankle edema  Psychiatric: Appropriate affect, cooperative  Neurologic: Oriented x 3, strength symmetric in all extremities, Cranial Nerves grossly intact to confrontation, speech clear  Skin: No rashes    Results Reviewed:    Results from last 7 days   Lab Units 10/08/19  0538 10/07/19  1607 10/07/19  0341  10/05/19  1723   WBC 10*3/mm3 5.30 4.42 3.65   < > 4.44   HEMOGLOBIN g/dL 8.1* 9.2* 8.5*   < > 8.2*   HEMATOCRIT % 26.8* 30.6* 28.6*   < > 26.9*   PLATELETS 10*3/mm3 435 445 445   < > 483*   PROCALCITONIN ng/mL  --   --   --   --  0.10    < > = values in this interval not displayed.     Results from last 7 days   Lab Units 10/08/19  0539 10/07/19  0341 10/07/19  0012 10/06/19  0330 10/05/19  1723   SODIUM mmol/L 136 137  --  133* 132*   POTASSIUM mmol/L 3.8  4.2 3.9 3.0* 3.3*   CHLORIDE mmol/L 109* 108*  --  102 99   CO2 mmol/L 21.0* 21.0*  --  23.0 23.0   BUN mg/dL 7 5*  --  7 9   CREATININE mg/dL 0.59* 0.65*  --  0.65* 0.66*   GLUCOSE mg/dL 136* 79  --  100* 105*   CALCIUM mg/dL 6.9* 6.9*  --  6.9* 7.8*   ALT (SGPT) U/L  --   --   --   --  15   AST (SGOT) U/L  --   --   --   --  16     Estimated Creatinine Clearance: 124.7 mL/min (A) (by C-G formula based on SCr of 0.59 mg/dL (L)).    Microbiology Results Abnormal     Procedure Component Value - Date/Time    Blood Culture - Blood, Arm, Right [[id number]] Collected:  10/06/19 0001    Lab Status:  Preliminary result Specimen:  Blood from Arm, Right Updated:  10/08/19 0221     Blood Culture No growth at 2 days    Blood Culture - Blood, Hand, Right [[id number]] Collected:  10/06/19 0005    Lab Status:  Preliminary result Specimen:  Blood from Hand, Right Updated:  10/08/19 0221     Blood Culture No growth at 2 days    Gastrointestinal Panel, PCR - Stool, Per Rectum [[id number]]  (Normal) Collected:  10/05/19 1848    Lab Status:  Final result Specimen:  Stool from Per Rectum Updated:  10/06/19 1456     Campylobacter Not Detected     Plesiomonas shigelloides Not Detected     Salmonella Not Detected     Vibrio Not Detected     Vibrio cholerae Not Detected     Yersinia enterocolitica Not Detected     Enteroaggregative E. coli (EAEC) Not Detected     Enteropathogenic E. coli (EPEC) Not Detected     Enterotoxigenic E. coli (ETEC) lt/st Not Detected     Shiga-like toxin-producing E. coli (STEC) stx1/stx2 Not Detected     E. coli O157 Not Detected     Shigella/Enteroinvasive E. coli (EIEC) Not Detected     Cryptosporidium Not Detected     Cyclospora cayetanensis Not Detected     Entamoeba histolytica Not Detected     Giardia lamblia Not Detected     Adenovirus F40/41 Not Detected     Astrovirus Not Detected     Norovirus GI/GII Not Detected     Rotavirus A Not Detected     Sapovirus (I, II, IV or V) Not Detected    Narrative:        If Aeromonas, Staphylococcus aureus or Bacillus cereus are suspected, please order [id number]: Stool Culture, Aeromonas, S aureus, B Cereus.    Clostridium Difficile Toxin - Stool, Per Rectum [[id number]] Collected:  10/05/19 1848    Lab Status:  Final result Specimen:  Stool from Per Rectum Updated:  10/05/19 2054    Narrative:       The following orders were created for panel order Clostridium Difficile Toxin - Stool, Per Rectum.  Procedure                               Abnormality         Status                     ---------                               -----------         ------                     Clostridium Difficile To...[[id number]]  Normal              Final result                 Please view results for these tests on the individual orders.    Clostridium Difficile Toxin, PCR - Stool, Per Rectum [[id number]]  (Normal) Collected:  10/05/19 1848    Lab Status:  Final result Specimen:  Stool from Per Rectum Updated:  10/05/19 2054     C. Difficile Toxins by PCR Not Detected    Narrative:       Performance characteristics of test not established for patients <2 years of age.  Negative for Toxigenic C. Difficile        Imaging Results (last 24 hours)     Procedure Component Value Units Date/Time    CT Abdomen Pelvis Without Contrast [[id number]] Collected:  10/05/19 1917     Updated:  10/08/19 1016    Narrative:       EXAMINATION: CT ABDOMEN/PELVIS WO CONTRAST - 10/05/2019      INDICATION: Abdominal pain, diarrhea.      TECHNIQUE: Unenhanced CT imaging of the abdomen and pelvis was performed  with additional coronal and sagittal reformatted imaging provided for  review.     The radiation dose reduction device was turned on for each scan per the  ALARA (As Low as Reasonably Achievable) protocol.     COMPARISON: None.      FINDINGS: Visualized lower lungs do not demonstrate evidence for acute  abnormality. The heart is unremarkable as visualized. Diffuse hepatic  steatosis is noted. Gallbladder is unremarkable in  appearance. The  spleen does not demonstrate abnormality. The pancreas is unremarkable.  The adrenal glands demonstrate mild thickening bilaterally and are  otherwise nonspecific. Likely superior pole renal cyst identified. The  kidneys are symmetric without evidence of hydronephrosis. Urinary  bladder is partially decompressed and otherwise unremarkable. The  prostate gland is prominent in size measuring up to 4.3 cm and  demonstrates calcification. Please note the appendix is not well  visualized, although no obvious appendicitis is identified.     No free intraperitoneal air is appreciated. The stomach is partially  decompressed and otherwise unremarkable. Fluid-filled small bowel is  identified suggesting an enteritis with additional diffuse pan colonic  wall thickening with pan colonic inflammation identified without  convincing CT evidence of pneumatosis or portal venous gas. No air is  appreciated within the liver. No pericolonic abscess appreciated. The  lack of oral and intravenous contrast limits evaluation particularly for  pericolonic abscess.      The visualized osseous structures appear intact. Thoracolumbar  degenerative changes are identified.       Impression:          There is diffuse pancolonic inflammation without evidence of pericolonic  abscess or free air.     Nonspecific prominent fluid-filled loops of small bowel suggesting  additional superimposed enteritis.     What is felt to represent the appendix within the right lower quadrant  is inflamed and measures up to 8 mm in thickness, although this is in  the setting of extensive pericecal inflammation and is likely reactive  appendiceal inflammation given the extensive surrounding and adjacent  colitis noted.     DICTATED:   10/05/2019  EDITED/ls :   10/06/2019      This report was finalized on 10/8/2019 10:13 AM by Dr. Hilario Abreu MD.           I have reviewed the medications:  Scheduled Meds:    folic acid 1 mg Oral Daily  "  lactobacillus acidophilus 1 capsule Oral Daily   sodium chloride 10 mL Intravenous Q12H   sulfaSALAzine 500 mg Oral Q8H     Continuous Infusions:    hydrocortisone (Solu-CORTEF) infusion 250 mg Last Rate: 250 mg (10/07/19 1528)   sodium chloride 0.9 % with KCl 20 mEq 75 mL/hr Last Rate: 75 mL/hr (10/08/19 1012)     PRN Meds:.•  acetaminophen  •  magnesium sulfate **OR** magnesium sulfate **OR** magnesium sulfate  •  ondansetron **OR** ondansetron  •  potassium & sodium phosphates **OR** potassium & sodium phosphates  •  potassium chloride **OR** potassium chloride **OR** potassium chloride  •  sodium chloride  •  sodium chloride    Assessment / Plan     Active Hospital Problems    Diagnosis  POA   • **Pancolitis (CMS/HCC) [K51.00]  Yes   • Acute blood loss anemia [D62]  Yes   • Rectal bleeding [K62.5]  Yes   • Unintentional weight loss [R63.4]  Yes   • Intractable diarrhea [R19.7]  Yes   • Severe protein-calorie malnutrition (CMS/HCC) [E43]  Yes   • Hypokalemia [E87.6]  Yes   • Hypoalbuminemia [E88.09]  Yes   • Iron deficiency anemia [D50.9]  Yes   • Thrombocytosis (CMS/HCC) [D47.3]  Yes   • Weight loss [R63.4]  Unknown   • Diarrhea [R19.7]  Unknown   • Iron deficiency anemia due to chronic blood loss [D50.0]  Unknown      Resolved Hospital Problems   No resolved problems to display.     Brief Hospital Course to date:  Rafa Dan is a 49 y.o. male with no known medical problems who presents to the ED due to unintentional weight loss and intractable diarrhea found to have pancolitis.    - CT abdomen/pelvis showed diffuse pancolonic inflammation  - Appreciate GI assistance  - Colonoscopy confirmed severe pan-colitis with ulcerations spontaneous bleeding, cobblstoning and pseudopolyps. Ileocecal valve appeared \"fish-mouthed.\" Terminal ileum normal.   - On SoluCortef drip and Sulfasalazine  - s/p 1 unit PRBCs on 10/6, Hgb now stable at 8/1  - Folate supplement started, add iron supplement   - Antibiotics " discontinued  - Monitor tachycardia, likely multifactorial given acute illness, anemia, volume depletion and IV steriods   - Hypotension improved with IV fluids  - Eating well on PO supplement, PRN boost. Unintentional weight loss likely secondary to protein-calorie malnutrition  - Hyponatremia and hypokalemia resolved with IV fluids and electrolyte replacement, continue to monitor     DVT Prophylaxis: Mechanical.   Disposition: Home in a few days     CODE STATUS:   Code Status and Medical Interventions:   Ordered at: 10/06/19 0114     Level Of Support Discussed With:    Patient     Code Status:    CPR     Medical Interventions (Level of Support Prior to Arrest):    Full     Electronically signed by Patrica Singleton PA-C, 10/08/19, 1:04 PM.

## 2019-10-08 NOTE — PLAN OF CARE
Problem: Patient Care Overview  Goal: Plan of Care Review  Outcome: Ongoing (interventions implemented as appropriate)   10/08/19 0512   Coping/Psychosocial   Plan of Care Reviewed With patient   Plan of Care Review   Progress no change   OTHER   Outcome Summary Vss on RA. Pt rested well. No complaints of pain. Will continue to monitor.        Problem: Bowel Disease, Inflammatory (Adult)  Goal: Signs and Symptoms of Listed Potential Problems Will be Absent, Minimized or Managed (Bowel Disease, Inflammatory)  Outcome: Ongoing (interventions implemented as appropriate)

## 2019-10-08 NOTE — PROGRESS NOTES
"GI Daily Progress Note  Subjective:    Chief Complaint:  Follow up pancolitis     Feels some better.   Still with diarrhea though possibly slightly less frequent.   Eating well and denies any abdominal pain.       Objective:    /63 (BP Location: Left arm, Patient Position: Lying)   Pulse 112   Temp 97.8 °F (36.6 °C) (Oral)   Resp 16   Ht 180.3 cm (71\")   Wt 59.5 kg (131 lb 3.2 oz)   SpO2 100%   BMI 18.30 kg/m²     Physical Exam   Constitutional: He is oriented to person, place, and time.   Thin male    Cardiovascular: Normal rate and regular rhythm.   Pulmonary/Chest: Effort normal and breath sounds normal. No respiratory distress.   Abdominal: Soft. Bowel sounds are normal. He exhibits no distension. There is no tenderness.   Neurological: He is alert and oriented to person, place, and time.   Skin: Skin is warm and dry.   Psychiatric: His behavior is normal.   Nursing note and vitals reviewed.      Lab  Lab Results   Component Value Date    WBC 5.30 10/08/2019    HGB 8.1 (L) 10/08/2019    HGB 9.2 (L) 10/07/2019    HGB 8.5 (L) 10/07/2019    MCV 85.4 10/08/2019     10/08/2019       Lab Results   Component Value Date    GLUCOSE 136 (H) 10/08/2019    BUN 7 10/08/2019    CREATININE 0.59 (L) 10/08/2019    EGFRIFNONA 145 10/08/2019    BCR 11.9 10/08/2019    CO2 21.0 (L) 10/08/2019    CALCIUM 6.9 (L) 10/08/2019    ALBUMIN 1.90 (L) 10/05/2019    ALKPHOS 76 10/05/2019    BILITOT 0.3 10/05/2019    ALT 15 10/05/2019    AST 16 10/05/2019       Assessment:    Pancolitis, pathology pending.     Severe protein calorie malnutrition   Diffuse fatty liver on CT, likely due to malnutrition and underweight  Abnormal weight loss     Plan:    >>> Continue Solu Cortef drip and Sulfasalazine   >>> Will check ANCA and ASCA  >>> Discontinue antibiotics  >>> Add 1 mg daily Folic Acid     SONI Alfred  10/08/19  12:54 PM    "

## 2019-10-08 NOTE — PLAN OF CARE
Problem: Patient Care Overview  Goal: Plan of Care Review  Outcome: Ongoing (interventions implemented as appropriate)   10/08/19 7642   Coping/Psychosocial   Plan of Care Reviewed With patient   Plan of Care Review   Progress improving   OTHER   Outcome Summary Vital signs wnl. Oxygen level greater than 90% on room air. No complaints of pain. Patient eating well.        Problem: Bowel Disease, Inflammatory (Adult)  Goal: Signs and Symptoms of Listed Potential Problems Will be Absent, Minimized or Managed (Bowel Disease, Inflammatory)  Outcome: Ongoing (interventions implemented as appropriate)

## 2019-10-08 NOTE — PROGRESS NOTES
Continued Stay Note   Becka     Patient Name: Rafa Dan  MRN: [id number]  Today's Date: 10/8/2019    Admit Date: 10/5/2019    Discharge Plan     Row Name 10/08/19 1358       Plan    Plan  home    Patient/Family in Agreement with Plan  yes    Plan Comments  I met with Mr. Dan at the bedside to discuss his discharge plan. He plans on returning home after discharge. [employer] is following and assisting in helping him apply for Medicaid. Once medicaid is obtained case management can scheduled appointment for Mr. Dan to establish care with a PCP. He denies having any other needs at this time.    Final Discharge Disposition Code  01 - home or self-care        Discharge Codes    No documentation.             Norbert Coleman RN

## 2019-10-09 PROBLEM — K51.011 ULCERATIVE PANCOLITIS WITH RECTAL BLEEDING (HCC): Status: ACTIVE | Noted: 2019-10-05

## 2019-10-09 LAB
ANION GAP SERPL CALCULATED.3IONS-SCNC: 11 MMOL/L (ref 5–15)
BUN BLD-MCNC: 8 MG/DL (ref 6–20)
BUN/CREAT SERPL: 11.1 (ref 7–25)
CALCIUM SPEC-SCNC: 7.1 MG/DL (ref 8.6–10.5)
CHLORIDE SERPL-SCNC: 105 MMOL/L (ref 98–107)
CO2 SERPL-SCNC: 23 MMOL/L (ref 22–29)
CREAT BLD-MCNC: 0.72 MG/DL (ref 0.76–1.27)
DEPRECATED RDW RBC AUTO: 60.2 FL (ref 37–54)
ERYTHROCYTE [DISTWIDTH] IN BLOOD BY AUTOMATED COUNT: 20 % (ref 12.3–15.4)
GFR SERPL CREATININE-BSD FRML MDRD: 115 ML/MIN/1.73
GLUCOSE BLD-MCNC: 210 MG/DL (ref 65–99)
HCT VFR BLD AUTO: 28.8 % (ref 37.5–51)
HGB BLD-MCNC: 8.6 G/DL (ref 13–17.7)
MAGNESIUM SERPL-MCNC: 1.9 MG/DL (ref 1.6–2.6)
MCH RBC QN AUTO: 26.1 PG (ref 26.6–33)
MCHC RBC AUTO-ENTMCNC: 29.9 G/DL (ref 31.5–35.7)
MCV RBC AUTO: 87.5 FL (ref 79–97)
PLATELET # BLD AUTO: 453 10*3/MM3 (ref 140–450)
PMV BLD AUTO: 8.5 FL (ref 6–12)
POTASSIUM BLD-SCNC: 3.2 MMOL/L (ref 3.5–5.2)
POTASSIUM BLD-SCNC: 4.5 MMOL/L (ref 3.5–5.2)
RBC # BLD AUTO: 3.29 10*6/MM3 (ref 4.14–5.8)
SODIUM BLD-SCNC: 139 MMOL/L (ref 136–145)
TSPOT INTERPRETATION: NORMAL
TSPOT NIL CONTROL INTERPRETATION: NORMAL
TSPOT PANEL A: 0
TSPOT PANEL B: 0
TSPOT POS CONTROL INTERPRETATION: NORMAL
WBC NRBC COR # BLD: 8.25 10*3/MM3 (ref 3.4–10.8)

## 2019-10-09 PROCEDURE — 83735 ASSAY OF MAGNESIUM: CPT | Performed by: PHYSICIAN ASSISTANT

## 2019-10-09 PROCEDURE — 86481 TB AG RESPONSE T-CELL SUSP: CPT | Performed by: FAMILY MEDICINE

## 2019-10-09 PROCEDURE — 86256 FLUORESCENT ANTIBODY TITER: CPT | Performed by: INTERNAL MEDICINE

## 2019-10-09 PROCEDURE — 99231 SBSQ HOSP IP/OBS SF/LOW 25: CPT | Performed by: PHYSICIAN ASSISTANT

## 2019-10-09 PROCEDURE — 85027 COMPLETE CBC AUTOMATED: CPT | Performed by: PHYSICIAN ASSISTANT

## 2019-10-09 PROCEDURE — 80048 BASIC METABOLIC PNL TOTAL CA: CPT | Performed by: PHYSICIAN ASSISTANT

## 2019-10-09 PROCEDURE — 86671 FUNGUS NES ANTIBODY: CPT | Performed by: INTERNAL MEDICINE

## 2019-10-09 PROCEDURE — 99232 SBSQ HOSP IP/OBS MODERATE 35: CPT | Performed by: PHYSICIAN ASSISTANT

## 2019-10-09 PROCEDURE — 84132 ASSAY OF SERUM POTASSIUM: CPT | Performed by: FAMILY MEDICINE

## 2019-10-09 PROCEDURE — 25810000003 SODIUM CHLORIDE 0.9 % WITH KCL 20 MEQ 20-0.9 MEQ/L-% SOLUTION: Performed by: INTERNAL MEDICINE

## 2019-10-09 PROCEDURE — 25010000003 HYDROCORTISONE SOD SUCCINATE PF 250 MG RECONSTITUTED SOLUTION 1 EACH VIAL: Performed by: INTERNAL MEDICINE

## 2019-10-09 RX ORDER — SULFASALAZINE 500 MG/1
1000 TABLET ORAL EVERY 8 HOURS SCHEDULED
Status: DISCONTINUED | OUTPATIENT
Start: 2019-10-09 | End: 2019-10-11 | Stop reason: HOSPADM

## 2019-10-09 RX ADMIN — FOLIC ACID 1 MG: 1 TABLET ORAL at 08:20

## 2019-10-09 RX ADMIN — FERROUS SULFATE TAB 325 MG (65 MG ELEMENTAL FE) 325 MG: 325 (65 FE) TAB at 08:20

## 2019-10-09 RX ADMIN — POTASSIUM CHLORIDE 40 MEQ: 750 CAPSULE, EXTENDED RELEASE ORAL at 06:37

## 2019-10-09 RX ADMIN — SODIUM CHLORIDE 250 MG: 9 INJECTION, SOLUTION INTRAVENOUS at 19:20

## 2019-10-09 RX ADMIN — MAGNESIUM SULFATE HEPTAHYDRATE 4 G: 40 INJECTION, SOLUTION INTRAVENOUS at 11:50

## 2019-10-09 RX ADMIN — SODIUM CHLORIDE, PRESERVATIVE FREE 10 ML: 5 INJECTION INTRAVENOUS at 21:17

## 2019-10-09 RX ADMIN — SULFASALAZINE 1000 MG: 500 TABLET ORAL at 13:21

## 2019-10-09 RX ADMIN — POTASSIUM CHLORIDE AND SODIUM CHLORIDE 75 ML/HR: 900; 150 INJECTION, SOLUTION INTRAVENOUS at 13:21

## 2019-10-09 RX ADMIN — SODIUM CHLORIDE, PRESERVATIVE FREE 10 ML: 5 INJECTION INTRAVENOUS at 08:21

## 2019-10-09 RX ADMIN — POTASSIUM CHLORIDE 40 MEQ: 750 CAPSULE, EXTENDED RELEASE ORAL at 11:14

## 2019-10-09 RX ADMIN — SULFASALAZINE 1000 MG: 500 TABLET ORAL at 21:17

## 2019-10-09 RX ADMIN — Medication 1 CAPSULE: at 09:59

## 2019-10-09 RX ADMIN — SULFASALAZINE 500 MG: 500 TABLET ORAL at 05:17

## 2019-10-09 NOTE — PROGRESS NOTES
"GI Daily Progress Note  Subjective:    Chief Complaint:  Follow up pancolitis     Mr. Dan is slowly seeing some improvement.   He had 12 bowel movements yesterday that were less bloody and with less urgency.  He is has only had one bowel movement this morning thus far.   He is eating very well.     He denies any issues or complaints.       Objective:    /66 (BP Location: Left arm, Patient Position: Lying)   Pulse 112   Temp 97.9 °F (36.6 °C) (Oral)   Resp 18   Ht 180.3 cm (71\")   Wt 64.4 kg (142 lb)   SpO2 100%   BMI 19.80 kg/m²     Physical Exam   Constitutional: He is oriented to person, place, and time.   Thin male    Cardiovascular: Normal rate and regular rhythm.   Pulmonary/Chest: Effort normal and breath sounds normal.   Abdominal: Soft. Bowel sounds are normal. He exhibits no distension. There is no tenderness.   Neurological: He is alert and oriented to person, place, and time.   Psychiatric: His behavior is normal.   Nursing note and vitals reviewed.      Lab  Lab Results   Component Value Date    WBC 8.25 10/09/2019    HGB 8.6 (L) 10/09/2019    HGB 8.1 (L) 10/08/2019    HGB 9.2 (L) 10/07/2019    MCV 87.5 10/09/2019     (H) 10/09/2019       Lab Results   Component Value Date    GLUCOSE 210 (H) 10/09/2019    BUN 8 10/09/2019    CREATININE 0.72 (L) 10/09/2019    EGFRIFNONA 115 10/09/2019    BCR 11.1 10/09/2019    CO2 23.0 10/09/2019    CALCIUM 7.1 (L) 10/09/2019    ALBUMIN 1.90 (L) 10/05/2019    ALKPHOS 76 10/05/2019    BILITOT 0.3 10/05/2019    ALT 15 10/05/2019    AST 16 10/05/2019       Assessment:    Ulcerative colitis   Unintentional weight loss   Severe protein calorie malnutrition   Diffuse fatty liver on CT, likely due to malnutrition and underweight  Hypokalemia     Plan:    >>> Colon biopsies consistent with UC.  He is tolerating Sulfasalazine at this time and will thus increase to 1,000 mg q8h.     >>> Replace K+  >>> Continue steroids     SONI Alfred  10/09/19  11:59 " AM

## 2019-10-09 NOTE — PROGRESS NOTES
[facility] Medicine Services  PROGRESS NOTE    Patient Name: Rafa Dan  : 1970  MRN: [id number]    Date of Admission: 10/5/2019  Primary Care Physician: Provider, No Known    Subjective     CC: f/u colitis and bleeding    HPI:  Sitting up in bed, eating breakfast. Was so hungry last night he ordered chinese food. 12+ loose stools yesterday, less blood too. Felt like he had more control over BMs yesterday and got up during the night less often. Starting to notice some side effects from the steroids - thirsty, jittery, irritable.     Review of Systems  Gen- No fevers, chills  CV- No chest pain, palpitations  Resp- No cough, dyspnea  GI- No N/V or abd pain, (+) diarrhea    Objective     Vital Signs:   Temp:  [97.4 °F (36.3 °C)-98 °F (36.7 °C)] 97.4 °F (36.3 °C)  Heart Rate:  [102-134] 113  Resp:  [16-20] 20  BP: (100-127)/(47-76) 127/76        Physical Exam:  Constitutional: No acute distress, awake, alert and conversant. Sitting up in bed eating breakfast. More upbeat and smiling today. Thin, some muscle wasting noted   HENT: NCAT, mucous membranes moist  Respiratory: Clear to auscultation bilaterally, respiratory effort normal on room air   Cardiovascular: RRR, no murmurs, rubs, or gallops, palpable pedal pulses bilaterally  Gastrointestinal: Positive bowel sounds, soft, nontender, nondistended  Musculoskeletal: No bilateral ankle edema  Psychiatric: Appropriate affect, cooperative  Neurologic: Oriented x 3, strength symmetric in all extremities, Cranial Nerves grossly intact to confrontation, speech clear  Skin: No rashes  No change from 10/8    Results Reviewed:    Results from last 7 days   Lab Units 10/09/19  0328 10/08/19  0538 10/07/19  1607  10/05/19  1723   WBC 10*3/mm3 8.25 5.30 4.42   < > 4.44   HEMOGLOBIN g/dL 8.6* 8.1* 9.2*   < > 8.2*   HEMATOCRIT % 28.8* 26.8* 30.6*   < > 26.9*   PLATELETS 10*3/mm3 453* 435 445   < > 483*   PROCALCITONIN ng/mL  --   --   --    --  0.10    < > = values in this interval not displayed.     Results from last 7 days   Lab Units 10/09/19  0328 10/08/19  0539 10/07/19  0341  10/05/19  1723   SODIUM mmol/L 139 136 137   < > 132*   POTASSIUM mmol/L 3.2* 3.8 4.2   < > 3.3*   CHLORIDE mmol/L 105 109* 108*   < > 99   CO2 mmol/L 23.0 21.0* 21.0*   < > 23.0   BUN mg/dL 8 7 5*   < > 9   CREATININE mg/dL 0.72* 0.59* 0.65*   < > 0.66*   GLUCOSE mg/dL 210* 136* 79   < > 105*   CALCIUM mg/dL 7.1* 6.9* 6.9*   < > 7.8*   ALT (SGPT) U/L  --   --   --   --  15   AST (SGOT) U/L  --   --   --   --  16    < > = values in this interval not displayed.     Estimated Creatinine Clearance: 110.6 mL/min (A) (by C-G formula based on SCr of 0.72 mg/dL (L)).    Microbiology Results Abnormal     Procedure Component Value - Date/Time    Blood Culture - Blood, Arm, Right [[id number]] Collected:  10/06/19 0001    Lab Status:  Preliminary result Specimen:  Blood from Arm, Right Updated:  10/09/19 0256     Blood Culture No growth at 3 days    Blood Culture - Blood, Hand, Right [[id number]] Collected:  10/06/19 0005    Lab Status:  Preliminary result Specimen:  Blood from Hand, Right Updated:  10/09/19 0256     Blood Culture No growth at 3 days    Gastrointestinal Panel, PCR - Stool, Per Rectum [[id number]]  (Normal) Collected:  10/05/19 1848    Lab Status:  Final result Specimen:  Stool from Per Rectum Updated:  10/06/19 1450     Campylobacter Not Detected     Plesiomonas shigelloides Not Detected     Salmonella Not Detected     Vibrio Not Detected     Vibrio cholerae Not Detected     Yersinia enterocolitica Not Detected     Enteroaggregative E. coli (EAEC) Not Detected     Enteropathogenic E. coli (EPEC) Not Detected     Enterotoxigenic E. coli (ETEC) lt/st Not Detected     Shiga-like toxin-producing E. coli (STEC) stx1/stx2 Not Detected     E. coli O157 Not Detected     Shigella/Enteroinvasive E. coli (EIEC) Not Detected     Cryptosporidium Not Detected     Cyclospora  cayetanensis Not Detected     Entamoeba histolytica Not Detected     Giardia lamblia Not Detected     Adenovirus F40/41 Not Detected     Astrovirus Not Detected     Norovirus GI/GII Not Detected     Rotavirus A Not Detected     Sapovirus (I, II, IV or V) Not Detected    Narrative:       If Aeromonas, Staphylococcus aureus or Bacillus cereus are suspected, please order [id number]: Stool Culture, Aeromonas, S aureus, B Cereus.    Clostridium Difficile Toxin - Stool, Per Rectum [[id number]] Collected:  10/05/19 1848    Lab Status:  Final result Specimen:  Stool from Per Rectum Updated:  10/05/19 2054    Narrative:       The following orders were created for panel order Clostridium Difficile Toxin - Stool, Per Rectum.  Procedure                               Abnormality         Status                     ---------                               -----------         ------                     Clostridium Difficile To...[[id number]]  Normal              Final result                 Please view results for these tests on the individual orders.    Clostridium Difficile Toxin, PCR - Stool, Per Rectum [[id number]]  (Normal) Collected:  10/05/19 1848    Lab Status:  Final result Specimen:  Stool from Per Rectum Updated:  10/05/19 2054     C. Difficile Toxins by PCR Not Detected    Narrative:       Performance characteristics of test not established for patients <2 years of age.  Negative for Toxigenic C. Difficile        Imaging Results (last 24 hours)     Procedure Component Value Units Date/Time    CT Abdomen Pelvis Without Contrast [[id number]] Collected:  10/05/19 1917     Updated:  10/08/19 1016    Narrative:       EXAMINATION: CT ABDOMEN/PELVIS WO CONTRAST - 10/05/2019      INDICATION: Abdominal pain, diarrhea.      TECHNIQUE: Unenhanced CT imaging of the abdomen and pelvis was performed  with additional coronal and sagittal reformatted imaging provided for  review.     The radiation dose reduction device was turned on for each  scan per the  ALARA (As Low as Reasonably Achievable) protocol.     COMPARISON: None.      FINDINGS: Visualized lower lungs do not demonstrate evidence for acute  abnormality. The heart is unremarkable as visualized. Diffuse hepatic  steatosis is noted. Gallbladder is unremarkable in appearance. The  spleen does not demonstrate abnormality. The pancreas is unremarkable.  The adrenal glands demonstrate mild thickening bilaterally and are  otherwise nonspecific. Likely superior pole renal cyst identified. The  kidneys are symmetric without evidence of hydronephrosis. Urinary  bladder is partially decompressed and otherwise unremarkable. The  prostate gland is prominent in size measuring up to 4.3 cm and  demonstrates calcification. Please note the appendix is not well  visualized, although no obvious appendicitis is identified.     No free intraperitoneal air is appreciated. The stomach is partially  decompressed and otherwise unremarkable. Fluid-filled small bowel is  identified suggesting an enteritis with additional diffuse pan colonic  wall thickening with pan colonic inflammation identified without  convincing CT evidence of pneumatosis or portal venous gas. No air is  appreciated within the liver. No pericolonic abscess appreciated. The  lack of oral and intravenous contrast limits evaluation particularly for  pericolonic abscess.      The visualized osseous structures appear intact. Thoracolumbar  degenerative changes are identified.       Impression:          There is diffuse pancolonic inflammation without evidence of pericolonic  abscess or free air.     Nonspecific prominent fluid-filled loops of small bowel suggesting  additional superimposed enteritis.     What is felt to represent the appendix within the right lower quadrant  is inflamed and measures up to 8 mm in thickness, although this is in  the setting of extensive pericecal inflammation and is likely reactive  appendiceal inflammation given the  extensive surrounding and adjacent  colitis noted.     DICTATED:   10/05/2019  EDITED/ls :   10/06/2019      This report was finalized on 10/8/2019 10:13 AM by Dr. Hilario Abreu MD.           I have reviewed the medications:  Scheduled Meds:    ferrous sulfate 325 mg Oral Daily With Breakfast   folic acid 1 mg Oral Daily   lactobacillus acidophilus 1 capsule Oral Daily   sodium chloride 10 mL Intravenous Q12H   sulfaSALAzine 500 mg Oral Q8H     Continuous Infusions:    hydrocortisone (Solu-CORTEF) infusion 250 mg Last Rate: 250 mg (10/08/19 1823)   sodium chloride 0.9 % with KCl 20 mEq 75 mL/hr Last Rate: 75 mL/hr (10/08/19 1840)     PRN Meds:.•  acetaminophen  •  magnesium sulfate **OR** magnesium sulfate **OR** magnesium sulfate  •  ondansetron **OR** ondansetron  •  potassium & sodium phosphates **OR** potassium & sodium phosphates  •  potassium chloride **OR** potassium chloride **OR** potassium chloride  •  sodium chloride  •  sodium chloride    Assessment / Plan     Active Hospital Problems    Diagnosis  POA   • **Pancolitis (CMS/HCC) [K51.00]  Yes   • Acute blood loss anemia [D62]  Yes   • Rectal bleeding [K62.5]  Yes   • Unintentional weight loss [R63.4]  Yes   • Intractable diarrhea [R19.7]  Yes   • Severe protein-calorie malnutrition (CMS/HCC) [E43]  Yes   • Hypokalemia [E87.6]  Yes   • Hypoalbuminemia [E88.09]  Yes   • Iron deficiency anemia [D50.9]  Yes   • Thrombocytosis (CMS/HCC) [D47.3]  Yes   • Weight loss [R63.4]  Unknown   • Diarrhea [R19.7]  Unknown   • Iron deficiency anemia due to chronic blood loss [D50.0]  Unknown      Resolved Hospital Problems   No resolved problems to display.     Brief Hospital Course to date:  Rafa Dan is a 49 y.o. male with no known medical problems who presents to the ED due to unintentional weight loss and intractable diarrhea found to have pancolitis.    - CT abdomen/pelvis showed diffuse pancolonic inflammation  - Appreciate GI assistance  - 10/7  "Colonoscopy confirmed severe pan-colitis with ulcerations spontaneous bleeding, cobblstoning and pseudopolyps. Ileocecal valve appeared \"fish-mouthed.\" Terminal ileum normal.   - Pathology reviewed but not discussed with patient (will ask GI to cover today) - findings are compatible with inflammatory bowel disease and were supportive of ulcerative colitis   - On SoluCortef drip and Sulfasalazine  - Contiue IV fluids while diarrhea still moderate/severe - stop soon, ankles are starting to swell   - s/p 1 unit PRBCs on 10/6, Hgb now stable at 8.6  - Folate and iron supplements started   - Antibiotics discontinued  - Monitor tachycardia, likely multifactorial given acute illness, anemia, volume depletion and IV steriods   - Hypotension improved with IV fluids  - Eating well on PO supplement, Boost TID with meals. Unintentional weight loss likely secondary to protein-calorie malnutrition  - Hyponatremia, hypomagnesemia and hypokalemia resolved with IV fluids and electrolyte replacement, continue to monitor     DVT Prophylaxis: Mechanical due to hematochezia. Patient ambulating frequently   Disposition: Home in 1-3 days? Want to ensure diarrhea has slowed to the point that he can mange his electrolytes - still on IV fluids with K+ and requiring replacement     CODE STATUS:   Code Status and Medical Interventions:   Ordered at: 10/06/19 0114     Level Of Support Discussed With:    Patient     Code Status:    CPR     Medical Interventions (Level of Support Prior to Arrest):    Full     Electronically signed by Patrica Singleton PA-C, 10/09/19, 8:43 AM.      "

## 2019-10-09 NOTE — PLAN OF CARE
Problem: Patient Care Overview  Goal: Plan of Care Review  Outcome: Ongoing (interventions implemented as appropriate)   10/09/19 4335   Coping/Psychosocial   Plan of Care Reviewed With patient   Plan of Care Review   Progress improving   OTHER   Outcome Summary Vital signs wnl. oxgyen level greater than 90% on room air. No complaints of pain. Appetite is good. Diarrhea a couple of times.       Problem: Bowel Disease, Inflammatory (Adult)  Goal: Signs and Symptoms of Listed Potential Problems Will be Absent, Minimized or Managed (Bowel Disease, Inflammatory)  Outcome: Ongoing (interventions implemented as appropriate)

## 2019-10-09 NOTE — PLAN OF CARE
Problem: Patient Care Overview  Goal: Plan of Care Review  Outcome: Ongoing (interventions implemented as appropriate)   10/09/19 0413   Coping/Psychosocial   Plan of Care Reviewed With patient   OTHER   Outcome Summary Pts Vss on RA. no complaints of pain. Pt has had a great appetite. Rested well. Will continue to monitor.        Problem: Bowel Disease, Inflammatory (Adult)  Goal: Signs and Symptoms of Listed Potential Problems Will be Absent, Minimized or Managed (Bowel Disease, Inflammatory)  Outcome: Ongoing (interventions implemented as appropriate)      Problem: GI Endoscopy (Adult)  Goal: Anesthesia/Sedation Recovery  Outcome: Outcome(s) achieved Date Met: 10/09/19

## 2019-10-10 LAB
ALBUMIN SERPL-MCNC: 1.7 G/DL (ref 3.5–5.2)
ALBUMIN/GLOB SERPL: 0.5 G/DL
ALP SERPL-CCNC: 73 U/L (ref 39–117)
ALT SERPL W P-5'-P-CCNC: 11 U/L (ref 1–41)
ANION GAP SERPL CALCULATED.3IONS-SCNC: 7 MMOL/L (ref 5–15)
AST SERPL-CCNC: 17 U/L (ref 1–40)
BILIRUB SERPL-MCNC: <0.2 MG/DL (ref 0.2–1.2)
BUN BLD-MCNC: 13 MG/DL (ref 6–20)
BUN/CREAT SERPL: 14.3 (ref 7–25)
CALCIUM SPEC-SCNC: 7.1 MG/DL (ref 8.6–10.5)
CHLORIDE SERPL-SCNC: 109 MMOL/L (ref 98–107)
CO2 SERPL-SCNC: 22 MMOL/L (ref 22–29)
CREAT BLD-MCNC: 0.91 MG/DL (ref 0.76–1.27)
GFR SERPL CREATININE-BSD FRML MDRD: 88 ML/MIN/1.73
GLOBULIN UR ELPH-MCNC: 3.6 GM/DL
GLUCOSE BLD-MCNC: 182 MG/DL (ref 65–99)
H CAPSUL AG UR-MCNC: <0.5 NG/ML
Lab: NORMAL
MAGNESIUM SERPL-MCNC: 2.1 MG/DL (ref 1.6–2.6)
POTASSIUM BLD-SCNC: 4.4 MMOL/L (ref 3.5–5.2)
PROT SERPL-MCNC: 5.3 G/DL (ref 6–8.5)
SODIUM BLD-SCNC: 138 MMOL/L (ref 136–145)

## 2019-10-10 PROCEDURE — 83735 ASSAY OF MAGNESIUM: CPT | Performed by: FAMILY MEDICINE

## 2019-10-10 PROCEDURE — 63710000001 PREDNISONE PER 1 MG: Performed by: PHYSICIAN ASSISTANT

## 2019-10-10 PROCEDURE — 80053 COMPREHEN METABOLIC PANEL: CPT | Performed by: NURSE PRACTITIONER

## 2019-10-10 PROCEDURE — 99232 SBSQ HOSP IP/OBS MODERATE 35: CPT | Performed by: PHYSICIAN ASSISTANT

## 2019-10-10 RX ORDER — FUROSEMIDE 40 MG/1
40 TABLET ORAL DAILY
Status: DISCONTINUED | OUTPATIENT
Start: 2019-10-10 | End: 2019-10-11 | Stop reason: HOSPADM

## 2019-10-10 RX ORDER — PREDNISONE 20 MG/1
40 TABLET ORAL
Status: DISCONTINUED | OUTPATIENT
Start: 2019-10-10 | End: 2019-10-11 | Stop reason: HOSPADM

## 2019-10-10 RX ADMIN — SODIUM CHLORIDE, PRESERVATIVE FREE 10 ML: 5 INJECTION INTRAVENOUS at 08:24

## 2019-10-10 RX ADMIN — FOLIC ACID 1 MG: 1 TABLET ORAL at 08:24

## 2019-10-10 RX ADMIN — FERROUS SULFATE TAB 325 MG (65 MG ELEMENTAL FE) 325 MG: 325 (65 FE) TAB at 08:24

## 2019-10-10 RX ADMIN — PREDNISONE 40 MG: 20 TABLET ORAL at 10:43

## 2019-10-10 RX ADMIN — SODIUM CHLORIDE, PRESERVATIVE FREE 10 ML: 5 INJECTION INTRAVENOUS at 21:05

## 2019-10-10 RX ADMIN — SULFASALAZINE 1000 MG: 500 TABLET ORAL at 04:49

## 2019-10-10 RX ADMIN — SULFASALAZINE 1000 MG: 500 TABLET ORAL at 13:24

## 2019-10-10 RX ADMIN — Medication 1 CAPSULE: at 08:24

## 2019-10-10 RX ADMIN — FUROSEMIDE 40 MG: 40 TABLET ORAL at 10:43

## 2019-10-10 RX ADMIN — SULFASALAZINE 1000 MG: 500 TABLET ORAL at 21:04

## 2019-10-10 NOTE — PROGRESS NOTES
Continued Stay Note  [facility]     Patient Name: Rafa Dan  MRN: [id number]  Today's Date: 10/10/2019    Admit Date: 10/5/2019    Discharge Plan     Row Name 10/10/19 3039       Plan    Plan  Social work called Kassi with [employer] and she said a primary care appointment can be made and the patient may have a $50 private pay fee to see the doctor but there are programs that can assist him at the outpatient clinic. Social work will call back to schedule the appointment on Friday.    Patient/Family in Agreement with Plan  yes    Row Name 10/10/19 1118       Plan    Plan Comments  Per Rut with [employer], Mr. Dan is over income for Medicaid. She stated that she is going to meet with him at the bedside today and discuss this with him. Mr. Dan does not have a PCP and will need follow up after discharge as well as medications. I have consulted Annette with  to meet with him.        Discharge Codes    No documentation.             JOSEF Mario

## 2019-10-10 NOTE — PROGRESS NOTES
Discussed with Patrica Singleton.  Since no further blood is seen in stools, may transition to oral prednisone 40 mg daily.  Agree with discontinue IV fluids due to developing anasarca.     Hepatitis B surface antigen negative.  Still awaiting urine histoplasma antigen.    >> T spot controls failed, and will order QuantiFERON gold for a.m.  >> Anticipate home tomorrow on 40 mg prednisone, sulfasalazine 1 g 3 times daily, and 1 mg folic acid daily.    Will need follow-up within 7-10 days with [facility] gastro.

## 2019-10-10 NOTE — PROGRESS NOTES
[facility] Medicine Services  PROGRESS NOTE    Patient Name: Rafa Dan  : 1970  MRN: [id number]    Date of Admission: 10/5/2019  Primary Care Physician: Provider, No Known    Subjective     CC: f/u ulcerative colitis flare    HPI:  Sitting up in bed. He is feeling better. 8 bowel movements yesterday - the last four were somewhat formed and non-bloody. He is feeling encouraged by this. Worsening leg edema today. Hopeful he can go home soon     Review of Systems  Gen- No fevers, chills  CV- No chest pain, palpitations  Resp- No cough, dyspnea  GI- No N/V or abd pain, (+) loose stools     Objective     Vital Signs:   Temp:  [97.9 °F (36.6 °C)-98.9 °F (37.2 °C)] 98.4 °F (36.9 °C)  Heart Rate:  [112-117] 117  Resp:  [16-18] 16  BP: (106-123)/(53-73) 106/53        Physical Exam:  Constitutional: Awake, alert and conversant. Sitting upright in bed. Thin, some muscle wasting   Eyes: PERRLA, sclerae anicteric, no conjunctival injection  HENT: NCAT, mucous membranes moist  Neck: Supple, no thyromegaly, no lymphadenopathy, trachea midline  Respiratory: Clear to auscultation bilaterally, nonlabored respirations   Cardiovascular: RRR, no murmurs, rubs, or gallops, palpable pedal pulses bilaterally  Gastrointestinal: Positive bowel sounds, soft, nontender, nondistended  Musculoskeletal: 1+ pitting BLE edema, no clubbing or cyanosis to extremities  Psychiatric: Appropriate affect, cooperative  Neurologic: Oriented x 3, strength symmetric in all extremities, Cranial Nerves grossly intact to confrontation, speech clear  Skin: No rashes     Results Reviewed:    Results from last 7 days   Lab Units 10/09/19  0328 10/08/19  0538 10/07/19  1607  10/05/19  1723   WBC 10*3/mm3 8.25 5.30 4.42   < > 4.44   HEMOGLOBIN g/dL 8.6* 8.1* 9.2*   < > 8.2*   HEMATOCRIT % 28.8* 26.8* 30.6*   < > 26.9*   PLATELETS 10*3/mm3 453* 435 445   < > 483*   PROCALCITONIN ng/mL  --   --   --   --  0.10    < > =  values in this interval not displayed.     Results from last 7 days   Lab Units 10/10/19  0442 10/09/19  1946 10/09/19  0328 10/08/19  0539  10/05/19  1723   SODIUM mmol/L 138  --  139 136   < > 132*   POTASSIUM mmol/L 4.4 4.5 3.2* 3.8   < > 3.3*   CHLORIDE mmol/L 109*  --  105 109*   < > 99   CO2 mmol/L 22.0  --  23.0 21.0*   < > 23.0   BUN mg/dL 13  --  8 7   < > 9   CREATININE mg/dL 0.91  --  0.72* 0.59*   < > 0.66*   GLUCOSE mg/dL 182*  --  210* 136*   < > 105*   CALCIUM mg/dL 7.1*  --  7.1* 6.9*   < > 7.8*   ALT (SGPT) U/L 11  --   --   --   --  15   AST (SGOT) U/L 17  --   --   --   --  16    < > = values in this interval not displayed.     Estimated Creatinine Clearance: 87.5 mL/min (by C-G formula based on SCr of 0.91 mg/dL).    Microbiology Results Abnormal     Procedure Component Value - Date/Time    Blood Culture - Blood, Arm, Right [[id number]] Collected:  10/06/19 0001    Lab Status:  Preliminary result Specimen:  Blood from Arm, Right Updated:  10/10/19 0246     Blood Culture No growth at 4 days    Blood Culture - Blood, Hand, Right [[id number]] Collected:  10/06/19 0005    Lab Status:  Preliminary result Specimen:  Blood from Hand, Right Updated:  10/10/19 0246     Blood Culture No growth at 4 days    Gastrointestinal Panel, PCR - Stool, Per Rectum [[id number]]  (Normal) Collected:  10/05/19 1848    Lab Status:  Final result Specimen:  Stool from Per Rectum Updated:  10/06/19 1459     Campylobacter Not Detected     Plesiomonas shigelloides Not Detected     Salmonella Not Detected     Vibrio Not Detected     Vibrio cholerae Not Detected     Yersinia enterocolitica Not Detected     Enteroaggregative E. coli (EAEC) Not Detected     Enteropathogenic E. coli (EPEC) Not Detected     Enterotoxigenic E. coli (ETEC) lt/st Not Detected     Shiga-like toxin-producing E. coli (STEC) stx1/stx2 Not Detected     E. coli O157 Not Detected     Shigella/Enteroinvasive E. coli (EIEC) Not Detected     Cryptosporidium Not  Detected     Cyclospora cayetanensis Not Detected     Entamoeba histolytica Not Detected     Giardia lamblia Not Detected     Adenovirus F40/41 Not Detected     Astrovirus Not Detected     Norovirus GI/GII Not Detected     Rotavirus A Not Detected     Sapovirus (I, II, IV or V) Not Detected    Narrative:       If Aeromonas, Staphylococcus aureus or Bacillus cereus are suspected, please order [id number]: Stool Culture, Aeromonas, S aureus, B Cereus.    Clostridium Difficile Toxin - Stool, Per Rectum [[id number]] Collected:  10/05/19 1848    Lab Status:  Final result Specimen:  Stool from Per Rectum Updated:  10/05/19 2054    Narrative:       The following orders were created for panel order Clostridium Difficile Toxin - Stool, Per Rectum.  Procedure                               Abnormality         Status                     ---------                               -----------         ------                     Clostridium Difficile To...[[id number]]  Normal              Final result                 Please view results for these tests on the individual orders.    Clostridium Difficile Toxin, PCR - Stool, Per Rectum [[id number]]  (Normal) Collected:  10/05/19 1848    Lab Status:  Final result Specimen:  Stool from Per Rectum Updated:  10/05/19 2054     C. Difficile Toxins by PCR Not Detected    Narrative:       Performance characteristics of test not established for patients <2 years of age.  Negative for Toxigenic C. Difficile        Imaging Results (last 24 hours)     ** No results found for the last 24 hours. **        I have reviewed the medications:  Scheduled Meds:    ferrous sulfate 325 mg Oral Daily With Breakfast   folic acid 1 mg Oral Daily   lactobacillus acidophilus 1 capsule Oral Daily   sodium chloride 10 mL Intravenous Q12H   sulfaSALAzine 1,000 mg Oral Q8H     Continuous Infusions:    hydrocortisone (Solu-CORTEF) infusion 250 mg Last Rate: 250 mg (10/09/19 1920)     PRN Meds  •  acetaminophen  •  magnesium  "sulfate **OR** magnesium sulfate **OR** magnesium sulfate  •  ondansetron **OR** ondansetron  •  potassium & sodium phosphates **OR** potassium & sodium phosphates  •  potassium chloride **OR** potassium chloride **OR** potassium chloride  •  sodium chloride  •  sodium chloride    Assessment / Plan     Active Hospital Problems    Diagnosis  POA   • **Ulcerative pancolitis with rectal bleeding (CMS/HCC) [K51.011]  Yes   • Acute blood loss anemia [D62]  Yes   • Rectal bleeding [K62.5]  Yes   • Unintentional weight loss [R63.4]  Yes   • Intractable diarrhea [R19.7]  Yes   • Severe protein-calorie malnutrition (CMS/HCC) [E43]  Yes   • Hypokalemia [E87.6]  Yes   • Hypoalbuminemia [E88.09]  Yes   • Iron deficiency anemia [D50.9]  Yes   • Thrombocytosis (CMS/HCC) [D47.3]  Yes   • Weight loss [R63.4]  Unknown   • Diarrhea [R19.7]  Unknown   • Iron deficiency anemia due to chronic blood loss [D50.0]  Unknown      Resolved Hospital Problems   No resolved problems to display.     Brief Hospital Course to date:  Rafa Dan is a 49 y.o. male with no known medical problems who presents to the ED due to unintentional weight loss and intractable diarrhea found to have pancolitis.    - CT abdomen/pelvis showed diffuse pancolonic inflammation  - Appreciate GI assistance  - 10/7 Colonoscopy confirmed severe pan-colitis with ulcerations spontaneous bleeding, cobblstoning and pseudopolyps. Ileocecal valve appeared \"fish-mouthed.\" Terminal ileum normal.   - Pathology findings compatible with ulcerative colitis. GI discussed this with the patient on 10/9   - On SoluCortef drip and Sulfasalazine  - Stop IV fluids. Albumin 1.7 - will discuss with GI if this warrants IV albumin vs IV lasix for volume overload.   - Creatinine up a bit today, 0.91 - monitor and repeat labs in AM   - s/p 1 unit PRBCs on 10/6, Hgb now stable at 8.6  - Folate and iron supplements started   - Antibiotics discontinued  - Monitor tachycardia, likely " multifactorial given acute illness, anemia, volume depletion and IV steriods   - Hypotension improved with IV fluids  - Eating well on PO supplement, Boost TID with meals. Unintentional weight loss likely secondary to protein-calorie malnutrition  - Hyponatremia, hypomagnesemia and hypokalemia resolved with IV fluids and electrolyte replacement, continue to monitor     DVT Prophylaxis: Mechanical due to hematochezia. Patient ambulating frequently   Disposition: Home tomorrow or Saturday.     CODE STATUS:   Code Status and Medical Interventions:   Ordered at: 10/06/19 0114     Level Of Support Discussed With:    Patient     Code Status:    CPR     Medical Interventions (Level of Support Prior to Arrest):    Full     Electronically signed by Patrica Singleton PA-C, 10/10/19, 8:42 AM.

## 2019-10-10 NOTE — PROGRESS NOTES
Clinical Nutrition   Reason For Visit: Follow-up protocol    Patient Name: Rafa Dan  YOB: 1970  MRN: [id number]  Date of Encounter: 10/10/19 2:57 PM  Admission date: 10/5/2019    Nutrition Assessment     Admission Problem List:  Intractable diarrhea  Pancolitis  Melena  Hypokalemia  Hypoalbuminemia  Iron deficiency anemia    (10/6) Severe, chronic illness malnutrition- MD attested.     PMH: He  has a past medical history of Colitis.   PSxH: He  has a past surgical history that includes Colonoscopy (N/A, 10/7/2019).     Reported/Observed/Food/Nutrition Related History     Anthropometrics   Height: 71 in  Weight: 121 lbs (standing weight 10/6 per ns doc)  BMI: 16.90 kg/m2   BMI classification: Underweight:<18.5kg/m2   IBW: 172 lbs (70% IBW)    (Per previous RD note 10/6):   UBW: 155 lbs (prior to late June/early July 2019 per pt)  Weight change: Unintentional weight loss of 34 lbs (21.9%) x 3 months.    Labs reviewed   Labs reviewed: Yes    Medications reviewed   Medications reviewed: Yes  Pertinent: iron, folvite, risaquad  GTT: IV solu-cortel, IVF +KCl @ 75 mL/hr  PRN: [id number], [id number]     Current Nutrition Prescription   PO: Diet Regular ; yogurt for PM snack daily     Oral Nutrition Supplement: Boost Plus TID    Evaluation of Received Nutrient/Fluid Intake: 100% of 3 meals    Nutrition Diagnosis   10/6  Problem Malnutrition  (severe, chronic)   Etiology Intractable diarrhea, ?malabsorption   Signs/Symptoms Unintentional weight loss of 34 lbs (21.9%) x 3 months; Severe fat loss evident at buccal and triceps regions; Severe muscle wasting evident at temporalis, clavicle, acromion, deltoid, scapula, qudadricep, and calf regions   Status: ongoing     Intervention   Intervention: Follow treatment progress, Care plan reviewed     Goal:   General: Maintain nutrition  PO: Maintain intake, Continue positive trend    Additional goals: No further weight loss    Monitoring/Evaluation:      Monitoring/Evaluation: Per protocol, I&O, PO intake, Supplement intake, Pertinent labs, Weight, GI status, Symptoms  Will Continue to follow per protocol  Rekha Ramos RD  Time Spent: 20 minutes

## 2019-10-10 NOTE — PROGRESS NOTES
Continued Stay Note  [facility]     Patient Name: Rafa Dan  MRN: [id number]  Today's Date: 10/10/2019    Admit Date: 10/5/2019    Discharge Plan     Row Name 10/10/19 1110       Plan    Plan Comments  Per Rut with [employer], Mr. Dan is over income for Medicaid. She stated that she is going to meet with him at the bedside today and discuss this with him. Mr. Dan does not have a PCP and will need follow up after discharge as well as medications. I have consulted Annette with  to meet with him.        Discharge Codes    No documentation.             Norbert Coleman RN

## 2019-10-11 VITALS
BODY MASS INDEX: 19.88 KG/M2 | OXYGEN SATURATION: 96 % | SYSTOLIC BLOOD PRESSURE: 122 MMHG | RESPIRATION RATE: 16 BRPM | WEIGHT: 142 LBS | TEMPERATURE: 98.4 F | DIASTOLIC BLOOD PRESSURE: 77 MMHG | HEIGHT: 71 IN | HEART RATE: 110 BPM

## 2019-10-11 PROBLEM — E87.6 HYPOKALEMIA: Status: RESOLVED | Noted: 2019-10-05 | Resolved: 2019-10-11

## 2019-10-11 PROBLEM — K62.5 RECTAL BLEEDING: Status: RESOLVED | Noted: 2019-10-06 | Resolved: 2019-10-11

## 2019-10-11 PROBLEM — D62 ACUTE BLOOD LOSS ANEMIA: Status: RESOLVED | Noted: 2019-10-07 | Resolved: 2019-10-11

## 2019-10-11 LAB
ALBUMIN SERPL-MCNC: 1.8 G/DL (ref 3.5–5.2)
ALBUMIN/GLOB SERPL: 0.7 G/DL
ALP SERPL-CCNC: 64 U/L (ref 39–117)
ALT SERPL W P-5'-P-CCNC: 16 U/L (ref 1–41)
ANION GAP SERPL CALCULATED.3IONS-SCNC: 8 MMOL/L (ref 5–15)
AST SERPL-CCNC: 26 U/L (ref 1–40)
BACTERIA SPEC AEROBE CULT: NORMAL
BACTERIA SPEC AEROBE CULT: NORMAL
BAKER'S YEAST IGG QN: 93 UNITS (ref 0–24.9)
BILIRUB SERPL-MCNC: <0.2 MG/DL (ref 0.2–1.2)
BUN BLD-MCNC: 19 MG/DL (ref 6–20)
BUN/CREAT SERPL: 31.1 (ref 7–25)
C-ANCA TITR SER IF: NORMAL TITER
CALCIUM SPEC-SCNC: 7 MG/DL (ref 8.6–10.5)
CHLORIDE SERPL-SCNC: 107 MMOL/L (ref 98–107)
CO2 SERPL-SCNC: 25 MMOL/L (ref 22–29)
CREAT BLD-MCNC: 0.61 MG/DL (ref 0.76–1.27)
GFR SERPL CREATININE-BSD FRML MDRD: 139 ML/MIN/1.73
GLOBULIN UR ELPH-MCNC: 2.7 GM/DL
GLUCOSE BLD-MCNC: 102 MG/DL (ref 65–99)
HCT VFR BLD AUTO: 27.2 % (ref 37.5–51)
HGB BLD-MCNC: 7.9 G/DL (ref 13–17.7)
MAGNESIUM SERPL-MCNC: 1.8 MG/DL (ref 1.6–2.6)
P-ANCA ATYPICAL TITR SER IF: NORMAL TITER
P-ANCA TITR SER IF: NORMAL TITER
PHOSPHATE SERPL-MCNC: 1.2 MG/DL (ref 2.5–4.5)
POTASSIUM BLD-SCNC: 3.8 MMOL/L (ref 3.5–5.2)
PROT SERPL-MCNC: 4.5 G/DL (ref 6–8.5)
SACCHAROMYCES CEREVISIAE AB IGA: 207.6 UNITS (ref 0–24.9)
SODIUM BLD-SCNC: 140 MMOL/L (ref 136–145)
TSPOT INTERPRETATION: NEGATIVE
TSPOT NIL CONTROL INTERPRETATION: NORMAL
TSPOT PANEL A: 0
TSPOT PANEL B: 0
TSPOT POS CONTROL INTERPRETATION: NORMAL

## 2019-10-11 PROCEDURE — 84100 ASSAY OF PHOSPHORUS: CPT | Performed by: PHYSICIAN ASSISTANT

## 2019-10-11 PROCEDURE — 63710000001 PREDNISONE PER 1 MG: Performed by: PHYSICIAN ASSISTANT

## 2019-10-11 PROCEDURE — 85018 HEMOGLOBIN: CPT | Performed by: PHYSICIAN ASSISTANT

## 2019-10-11 PROCEDURE — 80053 COMPREHEN METABOLIC PANEL: CPT | Performed by: PHYSICIAN ASSISTANT

## 2019-10-11 PROCEDURE — 99232 SBSQ HOSP IP/OBS MODERATE 35: CPT | Performed by: PHYSICIAN ASSISTANT

## 2019-10-11 PROCEDURE — 83735 ASSAY OF MAGNESIUM: CPT | Performed by: PHYSICIAN ASSISTANT

## 2019-10-11 PROCEDURE — 85014 HEMATOCRIT: CPT | Performed by: PHYSICIAN ASSISTANT

## 2019-10-11 PROCEDURE — 86480 TB TEST CELL IMMUN MEASURE: CPT | Performed by: INTERNAL MEDICINE

## 2019-10-11 PROCEDURE — 99239 HOSP IP/OBS DSCHRG MGMT >30: CPT | Performed by: NURSE PRACTITIONER

## 2019-10-11 RX ORDER — FERROUS SULFATE 325(65) MG
325 TABLET ORAL
Qty: 30 TABLET | Refills: 0 | Status: SHIPPED | OUTPATIENT
Start: 2019-10-12 | End: 2019-10-16

## 2019-10-11 RX ORDER — SULFASALAZINE 500 MG/1
1000 TABLET ORAL EVERY 8 HOURS SCHEDULED
Qty: 180 TABLET | Refills: 0 | Status: SHIPPED | OUTPATIENT
Start: 2019-10-11 | End: 2019-11-10

## 2019-10-11 RX ORDER — L.ACID,PARA/B.BIFIDUM/S.THERM 8B CELL
1 CAPSULE ORAL DAILY
Qty: 30 EACH | Refills: 0 | Status: SHIPPED | OUTPATIENT
Start: 2019-10-12 | End: 2019-11-25

## 2019-10-11 RX ORDER — PREDNISONE 20 MG/1
40 TABLET ORAL
Qty: 14 TABLET | Refills: 0 | Status: SHIPPED | OUTPATIENT
Start: 2019-10-12 | End: 2019-10-21

## 2019-10-11 RX ORDER — FOLIC ACID 1 MG/1
1 TABLET ORAL DAILY
Qty: 30 TABLET | Refills: 0 | Status: SHIPPED | OUTPATIENT
Start: 2019-10-12 | End: 2019-11-25

## 2019-10-11 RX ADMIN — FERROUS SULFATE TAB 325 MG (65 MG ELEMENTAL FE) 325 MG: 325 (65 FE) TAB at 08:42

## 2019-10-11 RX ADMIN — PREDNISONE 40 MG: 20 TABLET ORAL at 08:42

## 2019-10-11 RX ADMIN — MAGNESIUM SULFATE HEPTAHYDRATE 4 G: 40 INJECTION, SOLUTION INTRAVENOUS at 10:59

## 2019-10-11 RX ADMIN — SULFASALAZINE 1000 MG: 500 TABLET ORAL at 15:37

## 2019-10-11 RX ADMIN — POTASSIUM & SODIUM PHOSPHATES POWDER PACK 280-160-250 MG 2 PACKET: 280-160-250 PACK at 15:37

## 2019-10-11 RX ADMIN — SODIUM CHLORIDE, PRESERVATIVE FREE 10 ML: 5 INJECTION INTRAVENOUS at 08:43

## 2019-10-11 RX ADMIN — POTASSIUM & SODIUM PHOSPHATES POWDER PACK 280-160-250 MG 2 PACKET: 280-160-250 PACK at 08:42

## 2019-10-11 RX ADMIN — SULFASALAZINE 1000 MG: 500 TABLET ORAL at 05:17

## 2019-10-11 RX ADMIN — FOLIC ACID 1 MG: 1 TABLET ORAL at 08:42

## 2019-10-11 RX ADMIN — Medication 1 CAPSULE: at 08:42

## 2019-10-11 RX ADMIN — FUROSEMIDE 40 MG: 40 TABLET ORAL at 08:42

## 2019-10-11 NOTE — PROGRESS NOTES
"GI Daily Progress Note  Subjective:    Chief Complaint:  Follow up newly diagnosed ulcerative colitis     Patient overall states he feels improved.    Stools are now more formed with \"mild spotting\".   He complains of lower extremity edema for which he received Lasix yesterday with some improvement.     No abdominal pain, nausea nor vomiting.   Afebrile.       Objective:    /77 (BP Location: Left arm, Patient Position: Lying)   Pulse 110   Temp 98.4 °F (36.9 °C) (Oral)   Resp 16   Ht 180.3 cm (71\")   Wt 64.4 kg (142 lb)   SpO2 96%   BMI 19.80 kg/m²     Physical Exam   Constitutional: He is oriented to person, place, and time.   Thin male in no distress    HENT:   Head: Normocephalic and atraumatic.   Cardiovascular: Regular rhythm. Tachycardia present.   Pulmonary/Chest: Effort normal and breath sounds normal. No respiratory distress.   Abdominal: Soft. Bowel sounds are normal. He exhibits no distension. There is no tenderness.   Musculoskeletal:   1+ Pitting edema of the bilateral lower extremities   Neurological: He is alert and oriented to person, place, and time.   Skin: Skin is warm and dry.   Psychiatric: His behavior is normal.       Lab  Lab Results   Component Value Date    WBC 8.25 10/09/2019    HGB 8.6 (L) 10/09/2019    HGB 8.1 (L) 10/08/2019    HGB 9.2 (L) 10/07/2019    MCV 87.5 10/09/2019     (H) 10/09/2019       Lab Results   Component Value Date    GLUCOSE 102 (H) 10/11/2019    BUN 19 10/11/2019    CREATININE 0.61 (L) 10/11/2019    EGFRIFNONA 139 10/11/2019    BCR 31.1 (H) 10/11/2019    CO2 25.0 10/11/2019    CALCIUM 7.0 (L) 10/11/2019    ALBUMIN 1.80 (L) 10/11/2019    ALKPHOS 64 10/11/2019    BILITOT <0.2 (L) 10/11/2019    ALT 16 10/11/2019    AST 26 10/11/2019     Hepatitis B surface antigen negative.  Still awaiting urine histoplasma antigen.    Assessment:    Ulcerative colitis, newly diagnosed.  Acute blood loss anemia, status post 1 unit of PRBCs.   Now on Ferrous Sulfate " PO  Hypophosphatemia  Severe protein calorie malnutrition  Diffuse Fatty liver on CT, likely due to malnutrition and underweight     Plan:    >>> Continue 40 mg Prednisone daily   >>> Continue Sulfasalazine 1 gm TID with 1 mg Folic acid daily  >>> Needs phosphorus replacement   >>> Repeat H&H   >>> Lasix per primary     Follow up with [facility] in 1 week.   Will arrange.         SONI Alfred  10/11/19  9:51 AM

## 2019-10-11 NOTE — DISCHARGE SUMMARY
[facility] Medicine Services  DISCHARGE SUMMARY    Patient Name: Rafa Dan  : 1970  MRN: [id number]    Date of Admission: 10/5/2019  Date of Discharge: 10/11/2019  Primary Care Physician: Provider, No Known    Consults     Date and Time Order Name Status Description    10/6/2019 1634 Inpatient Gastroenterology Consult Completed     10/6/2019 0432 Inpatient Colorectal Surgery Consult Completed           Hospital Course     Presenting Problem:   Pancolitis (CMS/HCC) [K51.00]    Active Hospital Problems    Diagnosis  POA   • **Ulcerative pancolitis with rectal bleeding (CMS/HCC) [K51.011]  Yes   • Unintentional weight loss [R63.4]  Yes   • Intractable diarrhea [R19.7]  Yes   • Severe protein-calorie malnutrition (CMS/HCC) [E43]  Yes   • Hypoalbuminemia [E88.09]  Yes   • Iron deficiency anemia [D50.9]  Yes   • Thrombocytosis (CMS/HCC) [D47.3]  Yes   • Weight loss [R63.4]  Unknown   • Diarrhea [R19.7]  Unknown   • Iron deficiency anemia due to chronic blood loss [D50.0]  Unknown      Resolved Hospital Problems    Diagnosis Date Resolved POA   • Acute blood loss anemia [D62] 10/11/2019 Yes   • Rectal bleeding [K62.5] 10/11/2019 Yes   • Hypokalemia [E87.6] 10/11/2019 Yes          Hospital Course:  Rafa Dan is a 49 y.o. male with no previous medical problems presented to emergency department on 10/5/2019 with reported intractable diarrhea x2 to 3 months with an unintentional weight loss of approximately 40 pounds since July with new rectal bleeding.  Patient was started on IV antibiotics with blood in stool cultures sent.  Gastroenterology  Consulted with CT abdomen pelvis consistent with pancolonic inflammation.  He underwent colonoscopy on 10/7/2019 with pathology compatible with ulcerative colitis.  He was started on Solu-Cortef drip as well as sulfasalazine, folic acid.  He is transition to prednisone and tolerating well.  He will continue on 40 mg prednisone  daily, sulfasalazine 3 times daily with daily folic acid.  He will have close follow-up with gastroenterology in less than 1 week.    Patient with acute on chronic iron deficiency anemia.  Rectal bleeding has resolved and GI function improving.  He quantifies less diarrhea with forming stools.  He will need to continue daily iron supplement at discharge.  He did receive 1 unit PRBC on 10/6/2019.  Hemoglobin remains low but stable.  He currently is asymptomatic.  Blood pressure has remained stable.  Heart rate has remained in the low 100s stable sinus tachycardia.  Will need continuing monitor outpatient.    Patient with noted mild lower extremity edema.  He did receive 3 days Lasix with mild improvement.  We will hold further Lasix at discharge until follow-up with primary care/gastroenterology due to continued electrolyte depletion and need for replacement.  Will schedule close follow-up for repeat laboratory work-up and further management.      Discharge Follow Up Recommendations for labs/diagnostics:  Follow-up on October 16 with [facility] gastroenterology  Follow-up October 17 with DANA Carty as new pcp appt  Will need follow up on H/H, electrolytes. Will defer further diuretics to PCP upon reevaluation. Will hold for now due to electrolyte depletion    Day of Discharge     HPI:   Patient sitting up in bed.  Relays no overnight events.  States diarrhea is improving with less frequency and beginning to form stool.  No further bleeding.  Tolerating diet well without nausea or vomiting/abdominal pain.  Denies fever or chills.    Review of Systems  Gen- No fevers, chills  CV- No chest pain, palpitations  Resp- No cough, dyspnea  GI- No N/V, abd pain. Diarrhea improving    Otherwise ROS is negative except as mentioned in the HPI.    Vital Signs:   Temp:  [97.6 °F (36.4 °C)-98.4 °F (36.9 °C)] 98.4 °F (36.9 °C)  Heart Rate:  [106-122] 110  Resp:  [16-18] 16  BP: (103-122)/(65-78) 122/77      Physical Exam:  Constitutional: No acute distress, awake, alert, frail male  HENT: NCAT, mucous membranes moist  Respiratory: Clear to auscultation bilaterally, respiratory effort normal   Cardiovascular: regular but tachy, no murmurs, rubs, or gallops, palpable pedal pulses bilaterally  Gastrointestinal: Positive bowel sounds, soft, nontender, nondistended  Musculoskeletal: 1+ bilateral ankle edema  Psychiatric: Appropriate affect, cooperative  Neurologic: Oriented x 3, strength symmetric in all extremities, Cranial Nerves grossly intact to confrontation, speech clear  Skin: No rashes      Pertinent  and/or Most Recent Results     Results from last 7 days   Lab Units 10/11/19  0453 10/10/19  0442 10/09/19  1946 10/09/19  0328 10/08/19  0539 10/08/19  0538 10/07/19  1607 10/07/19  0341 10/07/19  0012 10/06/19  1601 10/06/19  0330 10/05/19  1723   WBC 10*3/mm3  --   --   --  8.25  --  5.30 4.42 3.65  --  4.22 3.19* 4.44   HEMOGLOBIN g/dL 7.9*  --   --  8.6*  --  8.1* 9.2* 8.5*  --  6.6* 7.0* 8.2*   HEMATOCRIT % 27.2*  --   --  28.8*  --  26.8* 30.6* 28.6*  --  22.1* 23.3* 26.9*   PLATELETS 10*3/mm3  --   --   --  453*  --  435 445 445  --  403 408 483*   SODIUM mmol/L 140 138  --  139 136  --   --  137  --   --  133* 132*   POTASSIUM mmol/L 3.8 4.4 4.5 3.2* 3.8  --   --  4.2 3.9  --  3.0* 3.3*   CHLORIDE mmol/L 107 109*  --  105 109*  --   --  108*  --   --  102 99   CO2 mmol/L 25.0 22.0  --  23.0 21.0*  --   --  21.0*  --   --  23.0 23.0   BUN mg/dL 19 13  --  8 7  --   --  5*  --   --  7 9   CREATININE mg/dL 0.61* 0.91  --  0.72* 0.59*  --   --  0.65*  --   --  0.65* 0.66*   GLUCOSE mg/dL 102* 182*  --  210* 136*  --   --  79  --   --  100* 105*   CALCIUM mg/dL 7.0* 7.1*  --  7.1* 6.9*  --   --  6.9*  --   --  6.9* 7.8*     Results from last 7 days   Lab Units 10/11/19  0453 10/10/19  0442 10/05/19  1723   BILIRUBIN mg/dL <0.2* <0.2* 0.3   ALK PHOS U/L 64 73 76   ALT (SGPT) U/L 16 11 15   AST (SGOT) U/L 26 17  16     Results from last 7 days   Lab Units 10/06/19  0330   CHOLESTEROL mg/dL 81   TRIGLYCERIDES mg/dL 30   HDL CHOL mg/dL 39*     Results from last 7 days   Lab Units 10/05/19  1723   TSH uIU/mL 2.900   PROCALCITONIN ng/mL 0.10   LACTATE mmol/L 0.8       Brief Urine Lab Results  (Last result in the past 365 days)      Color   Clarity   Blood   Leuk Est   Nitrite   Protein   CREAT   Urine HCG        10/05/19 1848 Dark Yellow Clear Negative Negative Negative Trace               Microbiology Results Abnormal     Procedure Component Value - Date/Time    Blood Culture - Blood, Arm, Right [[id number]] Collected:  10/06/19 0001    Lab Status:  Final result Specimen:  Blood from Arm, Right Updated:  10/11/19 0219     Blood Culture No growth at 5 days    Blood Culture - Blood, Hand, Right [[id number]] Collected:  10/06/19 0005    Lab Status:  Final result Specimen:  Blood from Hand, Right Updated:  10/11/19 0219     Blood Culture No growth at 5 days    Gastrointestinal Panel, PCR - Stool, Per Rectum [[id number]]  (Normal) Collected:  10/05/19 1848    Lab Status:  Final result Specimen:  Stool from Per Rectum Updated:  10/06/19 1456     Campylobacter Not Detected     Plesiomonas shigelloides Not Detected     Salmonella Not Detected     Vibrio Not Detected     Vibrio cholerae Not Detected     Yersinia enterocolitica Not Detected     Enteroaggregative E. coli (EAEC) Not Detected     Enteropathogenic E. coli (EPEC) Not Detected     Enterotoxigenic E. coli (ETEC) lt/st Not Detected     Shiga-like toxin-producing E. coli (STEC) stx1/stx2 Not Detected     E. coli O157 Not Detected     Shigella/Enteroinvasive E. coli (EIEC) Not Detected     Cryptosporidium Not Detected     Cyclospora cayetanensis Not Detected     Entamoeba histolytica Not Detected     Giardia lamblia Not Detected     Adenovirus F40/41 Not Detected     Astrovirus Not Detected     Norovirus GI/GII Not Detected     Rotavirus A Not Detected     Sapovirus (I, II, IV or V)  Not Detected    Narrative:       If Aeromonas, Staphylococcus aureus or Bacillus cereus are suspected, please order [id number]: Stool Culture, Aeromonas, S aureus, B Cereus.    Clostridium Difficile Toxin - Stool, Per Rectum [[id number]] Collected:  10/05/19 1848    Lab Status:  Final result Specimen:  Stool from Per Rectum Updated:  10/05/19 2054    Narrative:       The following orders were created for panel order Clostridium Difficile Toxin - Stool, Per Rectum.  Procedure                               Abnormality         Status                     ---------                               -----------         ------                     Clostridium Difficile To...[[id number]]  Normal              Final result                 Please view results for these tests on the individual orders.    Clostridium Difficile Toxin, PCR - Stool, Per Rectum [[id number]]  (Normal) Collected:  10/05/19 1848    Lab Status:  Final result Specimen:  Stool from Per Rectum Updated:  10/05/19 2054     C. Difficile Toxins by PCR Not Detected    Narrative:       Performance characteristics of test not established for patients <2 years of age.  Negative for Toxigenic C. Difficile          Imaging Results (all)     Procedure Component Value Units Date/Time    CT Abdomen Pelvis Without Contrast [[id number]] Collected:  10/05/19 1917     Updated:  10/08/19 1016    Narrative:       EXAMINATION: CT ABDOMEN/PELVIS WO CONTRAST - 10/05/2019      INDICATION: Abdominal pain, diarrhea.      TECHNIQUE: Unenhanced CT imaging of the abdomen and pelvis was performed  with additional coronal and sagittal reformatted imaging provided for  review.     The radiation dose reduction device was turned on for each scan per the  ALARA (As Low as Reasonably Achievable) protocol.     COMPARISON: None.      FINDINGS: Visualized lower lungs do not demonstrate evidence for acute  abnormality. The heart is unremarkable as visualized. Diffuse hepatic  steatosis is noted.  Gallbladder is unremarkable in appearance. The  spleen does not demonstrate abnormality. The pancreas is unremarkable.  The adrenal glands demonstrate mild thickening bilaterally and are  otherwise nonspecific. Likely superior pole renal cyst identified. The  kidneys are symmetric without evidence of hydronephrosis. Urinary  bladder is partially decompressed and otherwise unremarkable. The  prostate gland is prominent in size measuring up to 4.3 cm and  demonstrates calcification. Please note the appendix is not well  visualized, although no obvious appendicitis is identified.     No free intraperitoneal air is appreciated. The stomach is partially  decompressed and otherwise unremarkable. Fluid-filled small bowel is  identified suggesting an enteritis with additional diffuse pan colonic  wall thickening with pan colonic inflammation identified without  convincing CT evidence of pneumatosis or portal venous gas. No air is  appreciated within the liver. No pericolonic abscess appreciated. The  lack of oral and intravenous contrast limits evaluation particularly for  pericolonic abscess.      The visualized osseous structures appear intact. Thoracolumbar  degenerative changes are identified.       Impression:          There is diffuse pancolonic inflammation without evidence of pericolonic  abscess or free air.     Nonspecific prominent fluid-filled loops of small bowel suggesting  additional superimposed enteritis.     What is felt to represent the appendix within the right lower quadrant  is inflamed and measures up to 8 mm in thickness, although this is in  the setting of extensive pericecal inflammation and is likely reactive  appendiceal inflammation given the extensive surrounding and adjacent  colitis noted.     DICTATED:   10/05/2019  EDITED/ls :   10/06/2019      This report was finalized on 10/8/2019 10:13 AM by Dr. Hilario Abreu MD.                             Order Current Status    Anti-neutrophilic  Cytoplasmic Antibody In process    QuantiFERON TB Plus Client Incubated In process    Saccharomyces Cerevisiae Antibodies, IgG & IgA In process    TSPOT In process        Discharge Details        Discharge Medications      New Medications      Instructions Start Date   ferrous sulfate 325 (65 FE) MG tablet   325 mg, Oral, Daily With Breakfast   Start Date:  10/12/2019     folic acid 1 MG tablet  Commonly known as:  FOLVITE   1 mg, Oral, Daily   Start Date:  10/12/2019     lactobacillus acidophilus capsule capsule   1 capsule, Oral, Daily   Start Date:  10/12/2019     predniSONE 20 MG tablet  Commonly known as:  DELTASONE   40 mg, Oral, Daily With Breakfast   Start Date:  10/12/2019     sulfaSALAzine 500 MG tablet  Commonly known as:  AZULFIDINE   1,000 mg, Oral, Every 8 Hours Scheduled             No Known Allergies      Discharge Disposition:  Home or Self Care    Diet:  Hospital:  Diet Order   Procedures   • Diet Regular     Discharge:   Diet Instructions     Advance Diet As Tolerated            Discharge Activity:   Activity Instructions     Activity as Tolerated              CODE STATUS:    Code Status and Medical Interventions:   Ordered at: 10/06/19 0114     Level Of Support Discussed With:    Patient     Code Status:    CPR     Medical Interventions (Level of Support Prior to Arrest):    Full         Future Appointments   Date Time Provider Department Center   10/16/2019  2:45 PM Tawny Whitten APRN [facility] None   10/17/2019  9:45 AM Diego García APRN [facility] None       Additional Instructions for the Follow-ups that You Need to Schedule     Discharge Follow-up with PCP   As directed       Currently Documented PCP:    Provider, No Known    PCP Phone Number:    None     Follow Up Details:  as scheduled next week- needs reeval of electrolytes         Discharge Follow-up with Specified Provider: [facility] as scheduled next week   As directed      To:  [facility] as scheduled next week                Time Spent on Discharge:  50 minutes    Electronically signed by DANA Chambers, 10/11/19, 12:54 PM.

## 2019-10-11 NOTE — PROGRESS NOTES
Case Management Discharge Note    Final Note: I met with Mr. Dan at the bedside. He is being discharged home today. An appointment has been made for him to establish care with a PCP. This was placed in the AVS. Medications were sent to the [facility] pharmacy and they are delivering these to the bedside. He denies having any additional needs.    Destination      No service has been selected for the patient.      Durable Medical Equipment      No service has been selected for the patient.      Dialysis/Infusion      No service has been selected for the patient.      Home Medical Care      No service has been selected for the patient.      Therapy      No service has been selected for the patient.      Community Resources      No service has been selected for the patient.             Final Discharge Disposition Code: 01 - home or self-care

## 2019-10-16 ENCOUNTER — LAB (OUTPATIENT)
Dept: LAB | Facility: HOSPITAL | Age: 49
End: 2019-10-16

## 2019-10-16 ENCOUNTER — OFFICE VISIT (OUTPATIENT)
Dept: GASTROENTEROLOGY | Facility: CLINIC | Age: 49
End: 2019-10-16

## 2019-10-16 VITALS
TEMPERATURE: 97.7 F | HEIGHT: 71 IN | HEART RATE: 77 BPM | OXYGEN SATURATION: 95 % | SYSTOLIC BLOOD PRESSURE: 110 MMHG | WEIGHT: 161.6 LBS | BODY MASS INDEX: 22.62 KG/M2 | DIASTOLIC BLOOD PRESSURE: 60 MMHG

## 2019-10-16 DIAGNOSIS — R79.89 ABNORMAL CBC: ICD-10-CM

## 2019-10-16 DIAGNOSIS — K51.018 ULCERATIVE PANCOLITIS WITH OTHER COMPLICATION (HCC): ICD-10-CM

## 2019-10-16 DIAGNOSIS — D50.0 IRON DEFICIENCY ANEMIA DUE TO CHRONIC BLOOD LOSS: ICD-10-CM

## 2019-10-16 DIAGNOSIS — K51.018 ULCERATIVE PANCOLITIS WITH OTHER COMPLICATION (HCC): Primary | ICD-10-CM

## 2019-10-16 DIAGNOSIS — R63.4 UNINTENTIONAL WEIGHT LOSS: ICD-10-CM

## 2019-10-16 DIAGNOSIS — R60.0 BILATERAL LOWER EXTREMITY EDEMA: ICD-10-CM

## 2019-10-16 LAB
ALBUMIN SERPL-MCNC: 2.4 G/DL (ref 3.5–5.2)
ALBUMIN/GLOB SERPL: 0.7 G/DL
ALP SERPL-CCNC: 72 U/L (ref 39–117)
ALT SERPL W P-5'-P-CCNC: 51 U/L (ref 1–41)
ANION GAP SERPL CALCULATED.3IONS-SCNC: 8.5 MMOL/L (ref 5–15)
ANISOCYTOSIS BLD QL: ABNORMAL
AST SERPL-CCNC: 29 U/L (ref 1–40)
BILIRUB SERPL-MCNC: 0.2 MG/DL (ref 0.2–1.2)
BUN BLD-MCNC: 12 MG/DL (ref 6–20)
BUN/CREAT SERPL: 18.2 (ref 7–25)
CALCIUM SPEC-SCNC: 8 MG/DL (ref 8.6–10.5)
CHLORIDE SERPL-SCNC: 98 MMOL/L (ref 98–107)
CO2 SERPL-SCNC: 26.5 MMOL/L (ref 22–29)
CREAT BLD-MCNC: 0.66 MG/DL (ref 0.76–1.27)
CRP SERPL-MCNC: 2.22 MG/DL (ref 0–0.5)
DEPRECATED RDW RBC AUTO: 55.9 FL (ref 37–54)
ERYTHROCYTE [DISTWIDTH] IN BLOOD BY AUTOMATED COUNT: 19 % (ref 12.3–15.4)
GFR SERPL CREATININE-BSD FRML MDRD: 127 ML/MIN/1.73
GLOBULIN UR ELPH-MCNC: 3.3 GM/DL
GLUCOSE BLD-MCNC: 108 MG/DL (ref 65–99)
HCT VFR BLD AUTO: 25.1 % (ref 37.5–51)
HGB BLD-MCNC: 8.3 G/DL (ref 13–17.7)
LYMPHOCYTES # BLD MANUAL: 0.46 10*3/MM3 (ref 0.7–3.1)
LYMPHOCYTES NFR BLD MANUAL: 1 % (ref 5–12)
LYMPHOCYTES NFR BLD MANUAL: 8.3 % (ref 19.6–45.3)
MCH RBC QN AUTO: 28.3 PG (ref 26.6–33)
MCHC RBC AUTO-ENTMCNC: 33.1 G/DL (ref 31.5–35.7)
MCV RBC AUTO: 85.7 FL (ref 79–97)
MICROCYTES BLD QL: ABNORMAL
MONOCYTES # BLD AUTO: 0.06 10*3/MM3 (ref 0.1–0.9)
MYELOCYTES NFR BLD MANUAL: 5.2 % (ref 0–0)
NEUTROPHILS # BLD AUTO: 4.71 10*3/MM3 (ref 1.7–7)
NEUTROPHILS NFR BLD MANUAL: 85.4 % (ref 42.7–76)
PLAT MORPH BLD: NORMAL
PLATELET # BLD AUTO: 323 10*3/MM3 (ref 140–450)
PMV BLD AUTO: 9.7 FL (ref 6–12)
POLYCHROMASIA BLD QL SMEAR: ABNORMAL
POTASSIUM BLD-SCNC: 4.3 MMOL/L (ref 3.5–5.2)
PROT SERPL-MCNC: 5.7 G/DL (ref 6–8.5)
QUANTIFERON CRITERIA: NORMAL
QUANTIFERON MITOGEN VALUE: 0.65 IU/ML
QUANTIFERON NIL VALUE: 0.01 IU/ML
QUANTIFERON TB1 AG VALUE: 0.03 IU/ML
QUANTIFERON TB2 AG VALUE: 0.02 IU/ML
QUANTIFERON-TB GOLD PLUS: NEGATIVE
RBC # BLD AUTO: 2.93 10*6/MM3 (ref 4.14–5.8)
SODIUM BLD-SCNC: 133 MMOL/L (ref 136–145)
WBC MORPH BLD: NORMAL
WBC NRBC COR # BLD: 5.51 10*3/MM3 (ref 3.4–10.8)

## 2019-10-16 PROCEDURE — 82728 ASSAY OF FERRITIN: CPT

## 2019-10-16 PROCEDURE — 86140 C-REACTIVE PROTEIN: CPT

## 2019-10-16 PROCEDURE — 85025 COMPLETE CBC W/AUTO DIFF WBC: CPT

## 2019-10-16 PROCEDURE — 83540 ASSAY OF IRON: CPT

## 2019-10-16 PROCEDURE — 84466 ASSAY OF TRANSFERRIN: CPT

## 2019-10-16 PROCEDURE — 36415 COLL VENOUS BLD VENIPUNCTURE: CPT

## 2019-10-16 PROCEDURE — 99214 OFFICE O/P EST MOD 30 MIN: CPT | Performed by: NURSE PRACTITIONER

## 2019-10-16 PROCEDURE — 85007 BL SMEAR W/DIFF WBC COUNT: CPT

## 2019-10-16 PROCEDURE — 80053 COMPREHEN METABOLIC PANEL: CPT

## 2019-10-16 RX ORDER — FERROUS SULFATE 325(65) MG
650 TABLET ORAL 2 TIMES DAILY
Qty: 120 TABLET | Refills: 2 | Status: SHIPPED | OUTPATIENT
Start: 2019-10-16 | End: 2019-11-25

## 2019-10-16 RX ORDER — ASCORBIC ACID 500 MG
500 TABLET ORAL 2 TIMES DAILY
Qty: 120 TABLET | Refills: 2 | Status: SHIPPED | OUTPATIENT
Start: 2019-10-16 | End: 2019-10-21

## 2019-10-16 NOTE — PROGRESS NOTES
GASTROENTEROLOGY OUTPATIENT ESTABLISHED PATIENT NOTE  Patient: LAVERNE DAN : 1970  Date of Service: 10/16/2019  CC: Follow-up (HOSP)    Assessment/Plan                                             ASSESSMENT & PLANS     Ulcerative pancolitis with other complication (CMS/HCC)  -     C-reactive Protein; Future  -     Comprehensive Metabolic Panel; Future  -     CBC Auto Differential; Future  · Continue Sulfasalazine    Iron deficiency anemia due to chronic blood loss s/p 1 unit of PRBC transfusion on 10/7  -     Increase ferrous sulfate 325 (65 FE) MG tablet; Take 2 tablets by mouth 2 (Two) Times a Day. Take with OTC Vitamin C 500 mg 1 hour before or 2 hours after meals to enhance absorption  -     Add vitamin C (ASCORBIC ACID) 500 MG tablet; Take 1 tablet by mouth 2 (Two) Times a Day.  · Iron-rich diet    Bilateral lower extremity edema  · DC Prednisone    Unintentional weight loss  · Pt advised to take additional protein supplements w/ Boost or Ensure drinks    Follow Up:Return in about 6 weeks (around 2019).      DISCUSSION: The above plan was delineated in details with patient and all questions and concerns were answered.  Patient is also given contact information.  Patient is to return as scheduled or sooner if new problems arise.   Subjective                                                     SUBJECTIVE   History of Present Illness  Mr. Laverne Dan is a 49 y.o. male who is here for Follow-up (HOSP)  he was recently hospitalized for bloody diarrhea and wt loss. He was started Sulfasalazine. He has been doing better since hospital dc.   BM 5 times a day [place] 5, 6, 7. Much improved compared to before  Baseline wt 150-155 lbs. Down to 140 lbs recently  Lots of legs swelling since steroid. Denies claudication.  Just discomfort d/t edema. Has 2 days remaining of steroid    Colonoscopy, done on 10/7/19 by Dr Brunner, showed severe pancolitis with ulcerations, spontaneous bleeding,  "cobblestoning, and pseudopolyps.  Ileocecal valve is \"fish-mouthed\".  Terminal ileum is normal    ROS:Review of Systems   Constitutional: Negative.         Pt currently or recently takes NSAIDS ( (i.e Ibuprofen, Aleve, Advil, Exedrin, BC Powder, diclofenac, meloxicam, & Naproxen, etc)?  No    Pt currently or recently takes abx? No   HENT: Negative.    Eyes: Negative.    Respiratory: Negative.    Cardiovascular: Negative.    Gastrointestinal: Positive for diarrhea.   Endocrine: Negative.    Genitourinary: Negative.    Musculoskeletal: Positive for joint swelling.   Allergic/Immunologic: Negative.    Neurological: Negative.    Hematological: Negative.    Psychiatric/Behavioral: Negative.      Objective                                                           OBJECTIVE   Allergy: Pt has No Known Allergies.  MEDS: •  ferrous sulfate 325 (65 FE) MG tablet, Take 1 tablet by mouth Daily With Breakfast., Disp: 30 tablet, Rfl: 0  •  folic acid (FOLVITE) 1 MG tablet, Take 1 tablet by mouth Daily., Disp: 30 tablet, Rfl: 0  •  lactobacillus acidophilus (RISAQUAD) capsule capsule, Take 1 capsule by mouth Daily., Disp: 30 each, Rfl: 0  •  predniSONE (DELTASONE) 20 MG tablet, Take 2 tablets by mouth Daily With Breakfast., Disp: 14 tablet, Rfl: 0  •  sulfaSALAzine (AZULFIDINE) 500 MG tablet, Take 2 tablets by mouth Every 8 (Eight) Hours for 30 days., Disp: 180 tablet, Rfl: 0    Pathology  Lab Results   Component Value Date    FINALDX  10/07/2019     1. RIGHT COLON, BIOPSY:  Moderately active chronic colitis with scattered crypt abscesses, glandular architectural distortion, and basal lymphoplasmacytosis; see Comment.   2. LEFT COLON, BIOPSIES:  Moderately to severely active chronic colitis with glandular architectural distortion and glandular drop-out with scattered crypt abscesses and ulceration; see Comment.    /[facility]       COMDX  10/07/2019     The findings are compatible with the diagnosis of inflammatory bowel disease and are " supportive of ulcerative colitis, though it is important to note that the terminal ileum was not pathologically examined. No granulomas or dysplasia are identified.       MICRO  10/07/2019     The slides are reviewed and demonstrate histopathologic features supporting the above rendered diagnosis.            Lab Results   Component Value Date    WBC 8.25 10/09/2019    WBC 5.30 10/08/2019    WBC 4.42 10/07/2019    EOSABS 0.29 10/06/2019    EOSABS 0.27 10/05/2019    HGB 7.9 (L) 10/11/2019    HGB 8.6 (L) 10/09/2019    HGB 8.1 (L) 10/08/2019    HCT 27.2 (L) 10/11/2019    HCT 28.8 (L) 10/09/2019    HCT 26.8 (L) 10/08/2019    MCV 87.5 10/09/2019    MCV 85.4 10/08/2019    MCV 86.0 10/07/2019    MCHC 29.9 (L) 10/09/2019    MCHC 30.2 (L) 10/08/2019    MCHC 30.1 (L) 10/07/2019     (H) 10/09/2019     10/08/2019     10/07/2019     Lab Results   Component Value Date    RDW 20.0 (H) 10/09/2019    RDW 19.6 (H) 10/08/2019    RDW 19.6 (H) 10/07/2019    MCHC 29.9 (L) 10/09/2019    MCHC 30.2 (L) 10/08/2019    MCHC 30.1 (L) 10/07/2019    OCCULTBLD Positive (A) 10/06/2019    LABIRON 8 (L) 10/05/2019    FERRITIN 47.07 10/05/2019     Lab Results   Component Value Date     10/11/2019    K 3.8 10/11/2019     10/11/2019    CO2 25.0 10/11/2019    BUN 19 10/11/2019    BUN 13 10/10/2019    BUN 8 10/09/2019    CREATININE 0.61 (L) 10/11/2019    CREATININE 0.91 10/10/2019    CREATININE 0.72 (L) 10/09/2019    EGFRIFNONA 139 10/11/2019    EGFRIFNONA 88 10/10/2019    EGFRIFNONA 115 10/09/2019    GLUCOSE 102 (H) 10/11/2019    CALCIUM 7.0 (L) 10/11/2019    ANIONGAP 8.0 10/11/2019     Lab Results   Component Value Date    AST 26 10/11/2019    AST 17 10/10/2019    AST 16 10/05/2019    ALT 16 10/11/2019    ALT 11 10/10/2019    ALT 15 10/05/2019    ALKPHOS 64 10/11/2019    ALKPHOS 73 10/10/2019    ALKPHOS 76 10/05/2019    BILITOT <0.2 (L) 10/11/2019    BILITOT <0.2 (L) 10/10/2019    BILITOT 0.3 10/05/2019    ALBUMIN 1.80  (L) 10/11/2019    ALBUMIN 1.70 (L) 10/10/2019    ALBUMIN 1.90 (L) 10/05/2019     Lab Results   Component Value Date    LIPASE 73 (H) 10/05/2019     Lab Results   Component Value Date    TSH 2.900 10/05/2019     IBD Profile     Lab Results   Component Value Date    ATYPPANC <1:20 10/09/2019    SCERVIGG 93.0 (H) 10/09/2019    SCERVIGA 207.6 (H) 10/09/2019    CANCA Comment 10/09/2019    PANCA <1:20 10/09/2019     CRP (Normal 0.00 - 1.0 mg/dL or  0.00 - 10.0 mg/L)   Lab Results   Component Value Date    CRP 2.22 (H) 10/16/2019    CRP 4.15 (H) 10/05/2019     Stool Hemoccult Testing  Lab Results   Component Value Date    OCCULTBLD Positive (A) 10/06/2019     Hepatitis Panel  Lab Results   Component Value Date    HEPBSAG Non-Reactive 10/06/2019     TB QuantiFERON  Lab Results   Component Value Date    QUANTIFERO Comment 10/11/2019    QUANTITB1 0.03 10/11/2019    QUANTITB2 0.02 10/11/2019    QUANTIFERN 0.01 10/11/2019    QUANTIFERM 0.65 10/11/2019     T-Spot  Lab Results   Component Value Date    TSPOTTB Negative 10/09/2019    TSPOTTB  10/08/2019     Non-reportable due to insufficient cells.  Need 2x10e6 of peripheral blood mononuclear cells (PBMC) from whole blood. Recommend recollection of specimen. Two full tubes required for retest.     Urine Histo  Lab Results   Component Value Date    HISTOPLASGAL <0.5 10/08/2019       Lab Results   Component Value Date    CDIFF Not Detected 10/05/2019      Wt Readings from Last 5 Encounters:   10/16/19 73.3 kg (161 lb 9.6 oz)   10/09/19 64.4 kg (142 lb)   body mass index is 22.54 kg/m².,Temp: 97.7 °F (36.5 °C),BP: 110/60,Heart Rate: 77   Physical Exam  General Well developed; well nourished. NAD  ENT Anicteric sclerae. Oral mucosa pink and moist without thrush or lesions    GI Abd soft, NT, ND, normal active bowel sounds.  No HSM.  No abd hernia  Extremities Bilateral LE edema +4    Pt care team: Tawny CORTEZ & JOYCE Swann  10/16/19 2:45 PM  [facility]--Gastroenterology  [phone]    CC: , No Known   [facility] [id number] FAX:None

## 2019-10-16 NOTE — PATIENT INSTRUCTIONS
Start oral iron called Ferrous sulfate 325 mg tablet (fr Over the Counter).  Take 2 tabs by mouth twice a day for 3 months then stop.     Take oral iron with food to prevent stomach upset. However, NOT with cereals, dietary fiber, tea, coffee, eggs, milk, or any drinks that have calcium    Take with Vitamin C 500 mg tab (fr Over the Counter) at the same time when you take oral iron to enhance absorption.     Allow at least 4 hours between oral iron and the following medications:  · Calcium supplements  · Antacids (Maalox, tums, amphojel)  · PPIs (Proton pump inhibitors)   Prilosec/omeprazole  Nexium/esomeprazole  Prevacid/lansoprazole  Protonix/pantoprazole, etc.  · H-blockers        Zantac/ranitidine        Pepcid/famotidine  · Synthroid/levothyroxine  · Certain oral antibiotics (Ciprofloxacin, cefdinir, moxifloxacin/Avelox, ofloxacin, delafloxacin/Baxdela).   · Oral bone strengthening medications: alendronate (Fosamax; Binosto) and risedronate (Actonel; Atelvia).       Iron pills causes black stools and constipation.  If you have constipation, take OTC stool softener 1 or 2 caps once daily.      Below is a diet with food high iron.  I encourage you to follow it to help bring up your iron level along with iron supplement.     IRON-RICH DIET  Iron is a mineral that helps your body to produce hemoglobin. Hemoglobin is a protein in your red blood cells that carries oxygen to your body's tissues. Eating too little iron may cause you to feel weak and tired, and it can increase your risk for infection. Eating enough iron is necessary for your body's metabolism, muscle function, and nervous system.  Iron is naturally found in many foods. It can also be added to foods or fortified in foods. There are two types of dietary iron:  · Heme iron. Heme iron is absorbed by the body more easily than nonheme iron. Heme iron is found in meat, poultry, and fish.  · Nonheme iron. Nonheme iron is found in dietary supplements,  iron-fortified grains, beans, and vegetables.  You may need to follow an iron-rich diet if:  · You have been diagnosed with iron deficiency or iron-deficiency anemia.  · You have a condition that prevents you from absorbing dietary iron, such as:  ? Infection in your intestines.  ? Celiac disease. This involves long-lasting (chronic) inflammation of your intestines.  · You do not eat enough iron.  · You eat a diet that is high in foods that impair iron absorption.  · You have lost a lot of blood.  · You have heavy bleeding during your menstrual cycle.  · You are pregnant.  What is my plan?  Your health care provider may help you to determine how much iron you need per day based on your condition. Generally, when a person consumes sufficient amounts of iron in the diet, the following iron needs are met:  · Men.  ? 14-18 years old: 11 mg per day.  ? 19-50 years old: 8 mg per day.  · Women.  ? 14-18 years old: 15 mg per day.  ? 19-50 years old: 18 mg per day.  ? Over 50 years old: 8 mg per day.  ? Pregnant women: 27 mg per day.  ? Breastfeeding women: 9 mg per day.  What do I need to know about an iron-rich diet?  · Eat fresh fruits and vegetables that are high in vitamin C along with foods that are high in iron. This will help increase the amount of iron that your body absorbs from food, especially with foods containing nonheme iron. Foods that are high in vitamin C include oranges, peppers, tomatoes, and oskar.  · Take iron supplements only as directed by your health care provider. Overdose of iron can be life-threatening. If you were prescribed iron supplements, take them with orange juice or a vitamin C supplement.  · Cook foods in pots and pans that are made from iron.  · Eat nonheme iron-containing foods alongside foods that are high in heme iron. This helps to improve your iron absorption.  · Certain foods and drinks contain compounds that impair iron absorption. Avoid eating these foods in the same meal as  iron-rich foods or with iron supplements. These include:  ? Coffee, black tea, and red wine.  ? Milk, dairy products, and foods that are high in calcium.  ? Beans, soybeans, and peas.  ? Whole grains.  · When eating foods that contain both nonheme iron and compounds that impair iron absorption, follow these tips to absorb iron better.  ? Soak beans overnight before cooking.  ? Soak whole grains overnight and drain them before using.  ? Ferment flours before baking, such as using yeast in bread dough.  What foods can I eat?  Grains   Iron-fortified breakfast cereal. Iron-fortified whole-wheat bread. Enriched rice. Sprouted grains.  Vegetables   Spinach. Potatoes with skin. Green peas. Broccoli. Red and green bell peppers. Fermented vegetables.  Fruits   Prunes. Raisins. Oranges. Strawberries. [place]. Grapefruit.  Meats and Other Protein Sources   Beef liver. Oysters. Beef. Shrimp. Turkey. Chicken. Tuna. Sardines. Chickpeas. Nuts. Tofu.  Beverages   Tomato juice. Fresh orange juice. Prune juice. Hibiscus tea. Fortified instant breakfast shakes.  Condiments   Tahini. Fermented soy sauce.  Sweets and Desserts   Black-strap molasses.  Other   Wheat germ.  The items listed above may not be a complete list of recommended foods or beverages. Contact your dietitian for more options.   What foods are not recommended?  Grains   Whole grains. Bran cereal. Bran flour. Oats.  Vegetables   Artichokes. [place] sprouts. Kale.  Fruits   Blueberries. Raspberries. Strawberries. Figs.  Meats and Other Protein Sources   Soybeans. Products made from soy protein.  Dairy   Milk. Cream. Cheese. Yogurt. Cottage cheese.  Beverages   Coffee. Black tea. Red wine.  Sweets and Desserts   Cocoa. Chocolate. Ice cream.  Other   Basil. Oregano. Parsley.

## 2019-10-17 ENCOUNTER — TELEPHONE (OUTPATIENT)
Dept: GASTROENTEROLOGY | Facility: CLINIC | Age: 49
End: 2019-10-17

## 2019-10-17 LAB
FERRITIN SERPL-MCNC: 52.1 NG/ML (ref 30–400)
IRON 24H UR-MRATE: 86 MCG/DL (ref 59–158)
IRON SATN MFR SERPL: 31 % (ref 20–50)
PATHOLOGY REVIEW: YES
TIBC SERPL-MCNC: 279 MCG/DL (ref 298–536)
TRANSFERRIN SERPL-MCNC: 187 MG/DL (ref 200–360)

## 2019-10-17 NOTE — TELEPHONE ENCOUNTER
----- Message from DANA Banks sent at 10/17/2019  6:03 AM EDT -----  chao wilburn have lab add on [id number]

## 2019-10-17 NOTE — TELEPHONE ENCOUNTER
Called Chemistry lab and added on Peripheral lab order to specimen drawn on 10/16/19. Spoke with Lidia.

## 2019-10-18 LAB
LAB AP CASE REPORT: NORMAL
PATH REPORT.FINAL DX SPEC: NORMAL

## 2019-10-21 ENCOUNTER — OFFICE VISIT (OUTPATIENT)
Dept: FAMILY MEDICINE CLINIC | Facility: CLINIC | Age: 49
End: 2019-10-21

## 2019-10-21 ENCOUNTER — APPOINTMENT (OUTPATIENT)
Dept: LAB | Facility: HOSPITAL | Age: 49
End: 2019-10-21

## 2019-10-21 VITALS
TEMPERATURE: 98.7 F | BODY MASS INDEX: 20.16 KG/M2 | OXYGEN SATURATION: 99 % | HEIGHT: 71 IN | SYSTOLIC BLOOD PRESSURE: 118 MMHG | HEART RATE: 103 BPM | WEIGHT: 144 LBS | DIASTOLIC BLOOD PRESSURE: 80 MMHG

## 2019-10-21 DIAGNOSIS — E88.09 HYPOALBUMINEMIA: ICD-10-CM

## 2019-10-21 DIAGNOSIS — K51.00 ULCERATIVE PANCOLITIS WITHOUT COMPLICATION (HCC): Primary | ICD-10-CM

## 2019-10-21 DIAGNOSIS — D50.9 IRON DEFICIENCY ANEMIA, UNSPECIFIED IRON DEFICIENCY ANEMIA TYPE: ICD-10-CM

## 2019-10-21 LAB
ALBUMIN SERPL-MCNC: 2.4 G/DL (ref 3.5–5.2)
ALBUMIN/GLOB SERPL: 0.7 G/DL
ALP SERPL-CCNC: 53 U/L (ref 39–117)
ALT SERPL W P-5'-P-CCNC: 23 U/L (ref 1–41)
ANION GAP SERPL CALCULATED.3IONS-SCNC: 9.1 MMOL/L (ref 5–15)
AST SERPL-CCNC: 18 U/L (ref 1–40)
BILIRUB SERPL-MCNC: 0.3 MG/DL (ref 0.2–1.2)
BUN BLD-MCNC: 9 MG/DL (ref 6–20)
BUN/CREAT SERPL: 14.1 (ref 7–25)
CALCIUM SPEC-SCNC: 7.7 MG/DL (ref 8.6–10.5)
CHLORIDE SERPL-SCNC: 105 MMOL/L (ref 98–107)
CO2 SERPL-SCNC: 21.9 MMOL/L (ref 22–29)
CREAT BLD-MCNC: 0.64 MG/DL (ref 0.76–1.27)
GFR SERPL CREATININE-BSD FRML MDRD: 132 ML/MIN/1.73
GLOBULIN UR ELPH-MCNC: 3.6 GM/DL
GLUCOSE BLD-MCNC: 78 MG/DL (ref 65–99)
POTASSIUM BLD-SCNC: 4.2 MMOL/L (ref 3.5–5.2)
PROT SERPL-MCNC: 6 G/DL (ref 6–8.5)
SODIUM BLD-SCNC: 136 MMOL/L (ref 136–145)

## 2019-10-21 PROCEDURE — 80053 COMPREHEN METABOLIC PANEL: CPT | Performed by: NURSE PRACTITIONER

## 2019-10-21 PROCEDURE — 99213 OFFICE O/P EST LOW 20 MIN: CPT | Performed by: NURSE PRACTITIONER

## 2019-10-21 NOTE — PATIENT INSTRUCTIONS
Cooking With Less Salt  Cooking with less salt is one way to reduce the amount of sodium you get from food. Depending on your condition and overall health, your health care provider or diet and nutrition specialist (dietitian) may recommend that you reduce your sodium intake. Most people should have less than 2,300 milligrams (mg) of sodium each day. If you have high blood pressure (hypertension), you may need to limit your sodium to 1,500 mg each day. Follow the tips below to help reduce your sodium intake.  What do I need to know about cooking with less salt?  Shopping  · Buy sodium-free or low-sodium products. Look for the following words on food labels:  ? Low-sodium.  ? Sodium-free.  ? Reduced-sodium.  ? No salt added.  ? Unsalted.  · Buy fresh or frozen vegetables. Avoid canned vegetables.  · Avoid buying meats or protein foods that have been injected with broth or saline solution.  · Avoid cured or smoked meats, such as hot dogs, padilla, salami, ham, and bologna.  Reading food labels    · Check the food label before buying or using packaged ingredients.  · Look for products with no more than 140 mg of sodium in one serving.  · Do not choose foods with salt as one of the first three ingredients on the ingredients list. If salt is one of the first three ingredients, it usually means the item is high in sodium, because ingredients are listed in order of amount in the food item.  Cooking  · Use herbs, seasonings without salt, and spices as substitutes for salt in foods.  · Use sodium-free baking soda when baking.  · Erma, braise, or roast foods to add flavor with less salt.  · Avoid adding salt to pasta, rice, or hot cereals while cooking.  · Drain and rinse canned vegetables before use.  · Avoid adding salt when cooking sweets and desserts.  · Cook with low-sodium ingredients.  What are some salt alternatives?  The following are herbs, seasonings, and spices that can be used instead of salt to give taste to your  food. Herbs should be fresh or dried. Do not choose packaged mixes. Next to the name of the herb, spice, or seasoning are some examples of foods you can pair it with.  Herbs  · Bay leaves - Soups, meat and vegetable dishes, and spaghetti sauce.  · Basil - Italian dishes, soups, pasta, and fish dishes.  · Cilantro - Meat, poultry, and vegetable dishes.  · Chili powder - Marinades and Mexican dishes.  · Chives - Salad dressings and potato dishes.  · Cumin - Mexican dishes, couscous, and meat dishes.  · Dill - Fish dishes, sauces, and salads.  · Fennel - Meat and vegetable dishes, breads, and cookies.  · Garlic (do not use garlic salt) - Italian dishes, meat dishes, salad dressings, and sauces.  · Marjoram - Soups, potato dishes, and meat dishes.  · Oregano - Pizza and spaghetti sauce.  · Parsley - Salads, soups, pasta, and meat dishes.  · Elke - Italian dishes, salad dressings, soups, and red meats.  · Saffron - Fish dishes, pasta, and some poultry dishes.  · Gaston - Stuffings and sauces.  · Tarragon - Fish and poultry dishes.  · Thyme - Stuffing, meat, and fish dishes.  Seasonings  · Lemon juice - Fish dishes, poultry dishes, vegetables, and salads.  · Vinegar - Salad dressings, vegetables, and fish dishes.  Spices  · Cinnamon - Sweet dishes, such as cakes, cookies, and puddings.  · Cloves - Gingerbread, puddings, and marinades for meats.  · Vogel - Vegetable dishes, fish and poultry dishes, and stir-melton dishes.  · Laurence - Vegetables dishes, fish dishes, and stir-melton dishes.  · Nutmeg - Pasta, vegetables, poultry, fish dishes, and custard.  What are some low-sodium ingredients and foods?  · Fresh or frozen fruits and vegetables with no sauce added.  · Fresh or frozen whole meats, poultry, and fish with no sauce added.  · Eggs.  · Noodles, pasta, quinoa, rice.  · Shredded or puffed wheat or puffed rice.  · Regular or quick oats.  · Milk, yogurt, hard cheeses, and low-sodium cheeses. Good cheese choices include  "Swiss, Sloan Chester, and mozzarella. Always check the label for the serving size and sodium content.  · Unsalted butter or margarine.  · Unsalted nuts.  · Sherbet or ice cream (keep to ½ cup per serving).  · Homemade pudding.  · Sodium-free baking soda and baking powder.  This is not a complete list of low-sodium ingredients and foods. Contact your dietitian for more options.  Summary  · Cooking with less salt is one way to reduce the amount of sodium that you get from food.  · Buy sodium-free or low-sodium products.  · Check the food label before using or buying packaged ingredients.  · Use herbs, seasonings without salt, and spices as substitutes for salt in foods.  This information is not intended to replace advice given to you by your health care provider. Make sure you discuss any questions you have with your health care provider.  Document Released: 12/18/2006 Document Revised: 12/26/2017 Document Reviewed: 12/26/2017  [employer] Interactive Patient Education © 2019 [employer]  DASH Eating Plan  DASH stands for \"Dietary Approaches to Stop Hypertension.\" The DASH eating plan is a healthy eating plan that has been shown to reduce high blood pressure (hypertension). It may also reduce your risk for type 2 diabetes, heart disease, and stroke. The DASH eating plan may also help with weight loss.  What are tips for following this plan?    General guidelines  · Avoid eating more than 2,300 mg (milligrams) of salt (sodium) a day. If you have hypertension, you may need to reduce your sodium intake to 1,500 mg a day.  · Limit alcohol intake to no more than 1 drink a day for nonpregnant women and 2 drinks a day for men. One drink equals 12 oz of beer, 5 oz of wine, or 1½ oz of hard liquor.  · Work with your health care provider to maintain a healthy body weight or to lose weight. Ask what an ideal weight is for you.  · Get at least 30 minutes of exercise that causes your heart to beat faster (aerobic exercise) most " "days of the week. Activities may include walking, swimming, or biking.  · Work with your health care provider or diet and nutrition specialist (dietitian) to adjust your eating plan to your individual calorie needs.  Reading food labels    · Check food labels for the amount of sodium per serving. Choose foods with less than 5 percent of the Daily Value of sodium. Generally, foods with less than 300 mg of sodium per serving fit into this eating plan.  · To find whole grains, look for the word \"whole\" as the first word in the ingredient list.  Shopping  · Buy products labeled as \"low-sodium\" or \"no salt added.\"  · Buy fresh foods. Avoid canned foods and premade or frozen meals.  Cooking  · Avoid adding salt when cooking. Use salt-free seasonings or herbs instead of table salt or sea salt. Check with your health care provider or pharmacist before using salt substitutes.  · Do not melton foods. Cook foods using healthy methods such as baking, boiling, grilling, and broiling instead.  · Cook with heart-healthy oils, such as olive, canola, soybean, or sunflower oil.  Meal planning  · Eat a balanced diet that includes:  ? 5 or more servings of fruits and vegetables each day. At each meal, try to fill half of your plate with fruits and vegetables.  ? Up to 6-8 servings of whole grains each day.  ? Less than 6 oz of lean meat, poultry, or fish each day. A 3-oz serving of meat is about the same size as a deck of cards. One egg equals 1 oz.  ? 2 servings of low-fat dairy each day.  ? A serving of nuts, seeds, or beans 5 times each week.  ? Heart-healthy fats. Healthy fats called Omega-3 fatty acids are found in foods such as flaxseeds and coldwater fish, like sardines, salmon, and mackerel.  · Limit how much you eat of the following:  ? Canned or prepackaged foods.  ? Food that is high in trans fat, such as fried foods.  ? Food that is high in saturated fat, such as fatty meat.  ? Sweets, desserts, sugary drinks, and other foods " with added sugar.  ? Full-fat dairy products.  · Do not salt foods before eating.  · Try to eat at least 2 vegetarian meals each week.  · Eat more home-cooked food and less restaurant, buffet, and fast food.  · When eating at a restaurant, ask that your food be prepared with less salt or no salt, if possible.  What foods are recommended?  The items listed may not be a complete list. Talk with your dietitian about what dietary choices are best for you.  Grains  Whole-grain or whole-wheat bread. Whole-grain or whole-wheat pasta. Brown rice. Oatmeal. Quinoa. Bulgur. Whole-grain and low-sodium cereals. Alina bread. Low-fat, low-sodium crackers. Whole-wheat flour tortillas.  Vegetables  Fresh or frozen vegetables (raw, steamed, roasted, or grilled). Low-sodium or reduced-sodium tomato and vegetable juice. Low-sodium or reduced-sodium tomato sauce and tomato paste. Low-sodium or reduced-sodium canned vegetables.  Fruits  All fresh, dried, or frozen fruit. Canned fruit in natural juice (without added sugar).  Meat and other protein foods  Skinless chicken or turkey. Ground chicken or turkey. Pork with fat trimmed off. Fish and seafood. Egg whites. Dried beans, peas, or lentils. Unsalted nuts, nut butters, and seeds. Unsalted canned beans. Lean cuts of beef with fat trimmed off. Low-sodium, lean deli meat.  Dairy  Low-fat (1%) or fat-free (skim) milk. Fat-free, low-fat, or reduced-fat cheeses. Nonfat, low-sodium ricotta or cottage cheese. Low-fat or nonfat yogurt. Low-fat, low-sodium cheese.  Fats and oils  Soft margarine without trans fats. Vegetable oil. Low-fat, reduced-fat, or light mayonnaise and salad dressings (reduced-sodium). Canola, safflower, olive, soybean, and sunflower oils. Avocado.  Seasoning and other foods  Herbs. Spices. Seasoning mixes without salt. Unsalted popcorn and pretzels. Fat-free sweets.  What foods are not recommended?  The items listed may not be a complete list. Talk with your dietitian about  what dietary choices are best for you.  Grains  Baked goods made with fat, such as croissants, muffins, or some breads. Dry pasta or rice meal packs.  Vegetables  Creamed or fried vegetables. Vegetables in a cheese sauce. Regular canned vegetables (not low-sodium or reduced-sodium). Regular canned tomato sauce and paste (not low-sodium or reduced-sodium). Regular tomato and vegetable juice (not low-sodium or reduced-sodium). Pickles. Olives.  Fruits  Canned fruit in a light or heavy syrup. Fried fruit. Fruit in cream or butter sauce.  Meat and other protein foods  Fatty cuts of meat. Ribs. Fried meat. Wilson. Sausage. Bologna and other processed lunch meats. Salami. Fatback. Hotdogs. Bratwurst. Salted nuts and seeds. Canned beans with added salt. Canned or smoked fish. Whole eggs or egg yolks. Chicken or turkey with skin.  Dairy  Whole or 2% milk, cream, and half-and-half. Whole or full-fat cream cheese. Whole-fat or sweetened yogurt. Full-fat cheese. Nondairy creamers. Whipped toppings. Processed cheese and cheese spreads.  Fats and oils  Butter. Stick margarine. Lard. Shortening. Ghee. Wilson fat. Tropical oils, such as coconut, palm kernel, or palm oil.  Seasoning and other foods  Salted popcorn and pretzels. Onion salt, garlic salt, seasoned salt, table salt, and sea salt. Worcestershire sauce. Tartar sauce. Barbecue sauce. Teriyaki sauce. Soy sauce, including reduced-sodium. Steak sauce. Canned and packaged gravies. Fish sauce. Oyster sauce. Cocktail sauce. Horseradish that you find on the shelf. Ketchup. Mustard. Meat flavorings and tenderizers. Bouillon cubes. Hot sauce and Tabasco sauce. Premade or packaged marinades. Premade or packaged taco seasonings. Relishes. Regular salad dressings.  Where to find more information:  · National Heart, Lung, and Blood [place]: www.nhlbi.nih.gov  · American Heart Association: www.heart.org  Summary  · The DASH eating plan is a healthy eating plan that has been shown to  reduce high blood pressure (hypertension). It may also reduce your risk for type 2 diabetes, heart disease, and stroke.  · With the DASH eating plan, you should limit salt (sodium) intake to 2,300 mg a day. If you have hypertension, you may need to reduce your sodium intake to 1,500 mg a day.  · When on the DASH eating plan, aim to eat more fresh fruits and vegetables, whole grains, lean proteins, low-fat dairy, and heart-healthy fats.  · Work with your health care provider or diet and nutrition specialist (dietitian) to adjust your eating plan to your individual calorie needs.  This information is not intended to replace advice given to you by your health care provider. Make sure you discuss any questions you have with your health care provider.  Document Released: 12/06/2012 Document Revised: 12/11/2017 Document Reviewed: 12/11/2017  [employer] Interactive Patient Education © 2019 [employer]

## 2019-10-21 NOTE — PROGRESS NOTES
Chief Complaint   Patient presents with   • Ulcerative Colitis     He was recently hospitalized after a colitis flare up. He has a scope done and he was discharged on 10/11, He reports no pain and feels much better        Subjective   Rafa Dan is a 49 y.o. male    History of Present Illness  Date of Admission: 10/5/2019  Date of Discharge: 10/11/2019    Hospital Course:  Rafa Dan is a 49 y.o. male with no previous medical problems presented to emergency department on 10/5/2019 with reported intractable diarrhea x2 to 3 months with an unintentional weight loss of approximately 40 pounds since July with new rectal bleeding.  Patient was started on IV antibiotics with blood in stool cultures sent.  Gastroenterology  Consulted with CT abdomen pelvis consistent with pancolonic inflammation.  He underwent colonoscopy on 10/7/2019 with pathology compatible with ulcerative colitis.  He was started on Solu-Cortef drip as well as sulfasalazine, folic acid.  He is transition to prednisone and tolerating well.  He will continue on 40 mg prednisone daily, sulfasalazine 3 times daily with daily folic acid.  He will have close follow-up with gastroenterology in less than 1 week.     Patient with acute on chronic iron deficiency anemia.  Rectal bleeding has resolved and GI function improving.  He quantifies less diarrhea with forming stools.  He will need to continue daily iron supplement at discharge.  He did receive 1 unit PRBC on 10/6/2019.  Hemoglobin remains low but stable.  He currently is asymptomatic.  Blood pressure has remained stable.  Heart rate has remained in the low 100s stable sinus tachycardia.  Will need continuing monitor outpatient.     Patient with noted mild lower extremity edema.  He did receive 3 days Lasix with mild improvement.  We will hold further Lasix at discharge until follow-up with primary care/gastroenterology due to continued electrolyte depletion and need for replacement.   Will schedule close follow-up for repeat laboratory work-up and further management.        Discharge Follow Up Recommendations for labs/diagnostics:  Follow-up on October 16 with [facility] gastroenterology  Follow-up October 17 with DANA Carty as new pcp appt  Will need follow up on H/H, electrolytes. Will defer further diuretics to PCP upon reevaluation. Will hold for now due to electrolyte depletion      10/21/2019 patient in for follow-up from hospitalization as above.  He does report having a issues with occasional diarrhea sometimes depending on what he eats but denies any abdominal pain.  He also denies any kind of bleeding.  He did have some edema in his lower extremities as above but he reports this is much improved from the hospitalization.  He did have a colonoscopy procedure on 10/7/2019 which basically just showed pancolitis.  This was Garcia score 3.  He has seen GI since being out of the hospital.  They did increase his amount of iron he takes to 2 times a day.  Patient reports he feels so much better than when he was in the hospital.      No Known Allergies  Past Medical History:   Diagnosis Date   • Colitis       Past Surgical History:   Procedure Laterality Date   • COLONOSCOPY N/A 10/7/2019    Procedure: COLONOSCOPY;  Surgeon: Brunner, Mark I, MD;  Location: [facility] ENDOSCOPY;  Service: Gastroenterology     Social History     Socioeconomic History   • Marital status: Single     Spouse name: Not on file   • Number of children: Not on file   • Years of education: Not on file   • Highest education level: Not on file   Tobacco Use   • Smoking status: Former Smoker     Packs/day: 0.00     Years: 15.00     Pack years: 0.00   • Smokeless tobacco: Never Used   • Tobacco comment: quit 4 yearse ago    Substance and Sexual Activity   • Alcohol use: No     Frequency: Never   • Drug use: Yes     Frequency: 7.0 times per week     Types: Marijuana     Comment: smoked daily until 4 years ago          Current Outpatient Medications:   •  ferrous sulfate 325 (65 FE) MG tablet, Take 2 tablets by mouth 2 (Two) Times a Day. Take with OTC Vitamin C 500 mg 1 hour before or 2 hours after meals to enhance absorption, Disp: 120 tablet, Rfl: 2  •  folic acid (FOLVITE) 1 MG tablet, Take 1 tablet by mouth Daily., Disp: 30 tablet, Rfl: 0  •  lactobacillus acidophilus (RISAQUAD) capsule capsule, Take 1 capsule by mouth Daily., Disp: 30 each, Rfl: 0  •  sulfaSALAzine (AZULFIDINE) 500 MG tablet, Take 2 tablets by mouth Every 8 (Eight) Hours for 30 days., Disp: 180 tablet, Rfl: 0     Review of Systems   Constitutional: Negative for chills, fatigue, fever and unexpected weight change.   Respiratory: Negative for cough, chest tightness, shortness of breath and wheezing.    Cardiovascular: Positive for leg swelling ( better). Negative for chest pain and palpitations.   Gastrointestinal: Positive for diarrhea. Negative for abdominal pain, anal bleeding, blood in stool, constipation, nausea and vomiting.   Genitourinary: Negative for difficulty urinating and dysuria.   Skin: Negative for color change and rash.   Neurological: Negative for dizziness, syncope, weakness and headaches.   Psychiatric/Behavioral: Negative for sleep disturbance.       Objective     Vitals:    10/21/19 0845   BP: 118/80   Pulse: 103   Temp: 98.7 °F (37.1 °C)   SpO2: 99%       Results for orders placed or performed in visit on 10/16/19   C-reactive Protein   Result Value Ref Range    C-Reactive Protein 2.22 (H) 0.00 - 0.50 mg/dL   Comprehensive Metabolic Panel   Result Value Ref Range    Glucose 108 (H) 65 - 99 mg/dL    BUN 12 6 - 20 mg/dL    Creatinine 0.66 (L) 0.76 - 1.27 mg/dL    Sodium 133 (L) 136 - 145 mmol/L    Potassium 4.3 3.5 - 5.2 mmol/L    Chloride 98 98 - 107 mmol/L    CO2 26.5 22.0 - 29.0 mmol/L    Calcium 8.0 (L) 8.6 - 10.5 mg/dL    Total Protein 5.7 (L) 6.0 - 8.5 g/dL    Albumin 2.40 (L) 3.50 - 5.20 g/dL    ALT (SGPT) 51 (H) 1 - 41 U/L     AST (SGOT) 29 1 - 40 U/L    Alkaline Phosphatase 72 39 - 117 U/L    Total Bilirubin 0.2 0.2 - 1.2 mg/dL    eGFR Non African Amer 127 >60 mL/min/1.73    Globulin 3.3 gm/dL    A/G Ratio 0.7 g/dL    BUN/Creatinine Ratio 18.2 7.0 - 25.0    Anion Gap 8.5 5.0 - 15.0 mmol/L   CBC Auto Differential   Result Value Ref Range    WBC 5.51 3.40 - 10.80 10*3/mm3    RBC 2.93 (L) 4.14 - 5.80 10*6/mm3    Hemoglobin 8.3 (L) 13.0 - 17.7 g/dL    Hematocrit 25.1 (L) 37.5 - 51.0 %    MCV 85.7 79.0 - 97.0 fL    MCH 28.3 26.6 - 33.0 pg    MCHC 33.1 31.5 - 35.7 g/dL    RDW 19.0 (H) 12.3 - 15.4 %    RDW-SD 55.9 (H) 37.0 - 54.0 fl    MPV 9.7 6.0 - 12.0 fL    Platelets 323 140 - 450 10*3/mm3   Ferritin   Result Value Ref Range    Ferritin 52.10 30.00 - 400.00 ng/mL   Iron Profile   Result Value Ref Range    Iron 86 59 - 158 mcg/dL    Iron Saturation 31 20 - 50 %    Transferrin 187 (L) 200 - 360 mg/dL    TIBC 279 (L) 298 - 536 mcg/dL   Peripheral Blood Smear   Result Value Ref Range    Pathology Review Yes    Pathology Consultation   Result Value Ref Range    Final Diagnosis       1.  Peripheral Blood Smear Review:    A.  Normocytic anemia.   B.  Leukopenia.     C.  Absolute monocytopenia and absolute lymphopenia.    D.  Anisopoikilocytosis with elliptocytes and target cells and rare schistocytes and giant platelets.    E.  No blasts.     Comment:  There is a normocytic anemia and given the patient's clinical history and recent bleed, this could be related to bleeding.  The patient also has a lymphopenia and monocytopenia.  Numerous causes of lymphopenia exist including medications, gastrointestinal conditions, kidney disease, trauma and surgery, inherited conditions, infections, malignancies and auto immune disorders. The causes of monocytopenia include infections, medications, and myeloproliferative disorders.  If these  findings are persistent and unexplained, further work-up is usually recommended.  There were also several elliptocytes,  target cells and schistocytes.  The elliptocytes could suggest iron deficiency or hemoglobinopathy.  The target cells may suggest some underlying liver disease and the schistocytes could suggest some intravascular hemolysis.  LDH and haptoglobin may be helpful.  There was also some polychromasia which suggests red cell turnover.  Red cell turnover may be associated with the bleed noted in the patient's  history.  Schistocytes may also be seen in cases of TTP and DIC and those entities should be excluded.      daphney/brb      Case Report       Surgical Pathology Report                         Case: [id number]                                  Authorizing Provider:  Tawny Whitten,   Collected:           10/16/2019 03:40 PM                                 APRN                                                                         Ordering Location:     [facility]   Received:            10/17/2019 11:36 PM                                 [address]                                                            Pathologist:           Danny Hernandez MD                                                         Specimen:    Blood, Venous Line, periphral blood smear ( 2  slides)                                    Manual Differential   Result Value Ref Range    Neutrophil % 85.4 (H) 42.7 - 76.0 %    Lymphocyte % 8.3 (L) 19.6 - 45.3 %    Monocyte % 1.0 (L) 5.0 - 12.0 %    Myelocyte % 5.2 (H) 0.0 - 0.0 %    Neutrophils Absolute 4.71 1.70 - 7.00 10*3/mm3    Lymphocytes Absolute 0.46 (L) 0.70 - 3.10 10*3/mm3    Monocytes Absolute 0.06 (L) 0.10 - 0.90 10*3/mm3    Anisocytosis Slight/1+ None Seen    Microcytes Slight/1+ None Seen    Polychromasia Mod/2+ None Seen    WBC Morphology Normal Normal    Platelet Morphology Normal Normal       Physical Exam   Constitutional: He is oriented to person, place, and time. He appears well-developed and well-nourished.   HENT:   Head: Normocephalic and atraumatic.    Cardiovascular: Normal rate, regular rhythm and normal heart sounds.   Pulmonary/Chest: Effort normal and breath sounds normal.   Abdominal: Soft. Bowel sounds are normal. He exhibits no distension. There is no tenderness.   Musculoskeletal: He exhibits edema (2+ pedal).   Neurological: He is alert and oriented to person, place, and time.   Skin: Skin is warm and dry.   Psychiatric: He has a normal mood and affect. His behavior is normal.   Vitals reviewed.      Assessment/Plan   Problem List Items Addressed This Visit        Digestive    Ulcerative pancolitis without complication (CMS/HCC) - Primary    Relevant Orders    Comprehensive Metabolic Panel       Hematopoietic and Hemostatic    Iron deficiency anemia       Other    Hypoalbuminemia    Relevant Orders    Comprehensive Metabolic Panel      We will recheck a CMP on him today to check his kidney function and liver function as well as albumin level.  GI has been checking his levels for anemia and I recommend he continue to work with them on this.  We did discuss at great length lowering sodium in his diet and keeping his feet propped up in order to continue to reduce the swelling in his extremities.    RV- 3 mo    Counseling provided on Low-sodium diet.    Diego García, DANA   10/21/2019   9:37 AM

## 2019-11-25 ENCOUNTER — OFFICE VISIT (OUTPATIENT)
Dept: GASTROENTEROLOGY | Facility: CLINIC | Age: 49
End: 2019-11-25

## 2019-11-25 VITALS
HEART RATE: 54 BPM | WEIGHT: 121.8 LBS | OXYGEN SATURATION: 95 % | DIASTOLIC BLOOD PRESSURE: 60 MMHG | TEMPERATURE: 97.3 F | HEIGHT: 71 IN | BODY MASS INDEX: 17.05 KG/M2 | SYSTOLIC BLOOD PRESSURE: 100 MMHG

## 2019-11-25 DIAGNOSIS — D50.0 IRON DEFICIENCY ANEMIA DUE TO CHRONIC BLOOD LOSS: ICD-10-CM

## 2019-11-25 DIAGNOSIS — K51.018 ULCERATIVE PANCOLITIS WITH OTHER COMPLICATION (HCC): Primary | ICD-10-CM

## 2019-11-25 DIAGNOSIS — R63.4 UNINTENTIONAL WEIGHT LOSS: ICD-10-CM

## 2019-11-25 PROCEDURE — 99213 OFFICE O/P EST LOW 20 MIN: CPT | Performed by: NURSE PRACTITIONER

## 2019-11-25 RX ORDER — ASCORBIC ACID 500 MG
500 TABLET ORAL 2 TIMES DAILY
Qty: 120 TABLET | Refills: 2 | Status: SHIPPED | OUTPATIENT
Start: 2019-11-25

## 2019-11-25 RX ORDER — FERROUS SULFATE 325(65) MG
650 TABLET ORAL 2 TIMES DAILY
Qty: 120 TABLET | Refills: 2 | Status: SHIPPED | OUTPATIENT
Start: 2019-11-25

## 2019-11-25 RX ORDER — ERGOCALCIFEROL 1.25 MG/1
50000 CAPSULE ORAL WEEKLY
Qty: 8 CAPSULE | Refills: 0 | Status: SHIPPED | OUTPATIENT
Start: 2019-11-25 | End: 2020-01-20

## 2019-11-25 RX ORDER — SULFASALAZINE 500 MG/1
1000 TABLET, DELAYED RELEASE ORAL 3 TIMES DAILY
Qty: 540 TABLET | Refills: 2 | Status: SHIPPED | OUTPATIENT
Start: 2019-11-25 | End: 2020-01-03 | Stop reason: SDUPTHER

## 2019-11-25 NOTE — PROGRESS NOTES
GASTROENTEROLOGY OUTPATIENT ESTABLISHED PATIENT NOTE  Patient: LAVERNE DAN : 1970  Date of Service: 2019  CC: Follow-up (UC)     Assessment/Plan                                             ASSESSMENT & PLANS     Ulcerative pancolitis with other complication (CMS/HCC)  -     Restart sulfaSALAzine (AZULFIDINE) 500 MG EC tablet; Take 2 tablets by mouth 3 (Three) Times a Day. I explained to pt that UC is a chronic, autoimmune disease and that long term medical mgt is needed and that he is not to stop meds unless instructed otherwise.  He is reminded to call us for refills if he is running low of meds.   -     Start Cyanocobalamin 1000 MCG sublingual tablet; Place 1 tablet under the tongue Daily.  -     Start vitamin D (ERGOCALCIFEROL) 1.25 MG (85516 UT) capsule capsule; Take 1 capsule by mouth 1 (One) Time Per Week for 8 doses.    Iron deficiency anemia due to chronic blood loss s/p Oct 7, 2019  -     Start vitamin C (ASCORBIC ACID) 500 MG tablet; Take 1 tablet by mouth 2 (Two) Times a Day.  -     Start ferrous sulfate 325 (65 FE) MG tablet; Take 2 tablets by mouth 2 (Two) Times a Day. Take with OTC Vitamin C 500 mg 1 hour before or 2 hours after meals to enhance absorption    Unintentional weight loss 20 lbs (documented) w/ the last 6 wks.  · Pt is encouraged take protein supplements (Ensure and Boost) in between meals    Follow Up:Return in about 3 months (around 2020) for Recheck.      DISCUSSION: The above plan was delineated in details with patient and all questions and concerns were answered.  Patient is also given contact information.  Patient is to return as scheduled or sooner if new problems arise.   Subjective                                                     SUBJECTIVE   History of Present Illness  Mr. Laverne Dan is a 49 y.o. male who is here for Follow-up. Dx w/ UC in Oct 2019 and mgt w/ sulfaSALAzine.   out of meds for 10 to 14 days. Pt thought that he was only  "to take UC meds until they run out since he did not have any refills.   Sx recurred w/ abd pain and diarrhea  Baseline wt 150-155 lbs. Down to 120 lbs today     Colonoscopy, done on 10/7/19 by Dr Brunner, showed severe pancolitis with ulcerations, spontaneous bleeding, cobblestoning, and pseudopolyps.  Ileocecal valve is \"fish-mouthed\".  Terminal ileum is normal    ROS:Review of Systems   Constitutional: Positive for unexpected weight change (loss).        Pt currently or recently takes NSAIDS ( (i.e Ibuprofen, Aleve, Advil, Exedrin, BC Powder, diclofenac, meloxicam, & Naproxen, etc)? No    Pt currently or recently takes abx? No   HENT: Negative.    Eyes: Negative.    Respiratory: Negative.    Cardiovascular: Negative.    Gastrointestinal: Positive for abdominal pain, anal bleeding and diarrhea (Frequently). Negative for abdominal distention.   Endocrine: Negative.    Genitourinary: Negative.    Musculoskeletal: Negative.    Skin: Negative.    Allergic/Immunologic: Negative.    Neurological: Negative.    Hematological: Negative.    Psychiatric/Behavioral: Negative.      Objective                                                           OBJECTIVE   Allergy: Pt has No Known Allergies.  MEDS: No current outpatient medications on file.    Lab Results   Component Value Date    WBC 5.51 10/16/2019    WBC 8.25 10/09/2019    WBC 5.30 10/08/2019    EOSABS 0.29 10/06/2019    EOSABS 0.27 10/05/2019    HGB 8.3 (L) 10/16/2019    HGB 7.9 (L) 10/11/2019    HGB 8.6 (L) 10/09/2019    HCT 25.1 (L) 10/16/2019    HCT 27.2 (L) 10/11/2019    HCT 28.8 (L) 10/09/2019    MCV 85.7 10/16/2019    MCV 87.5 10/09/2019    MCV 85.4 10/08/2019    MCHC 33.1 10/16/2019    MCHC 29.9 (L) 10/09/2019    MCHC 30.2 (L) 10/08/2019     10/16/2019     (H) 10/09/2019     10/08/2019     Lab Results   Component Value Date    RDW 19.0 (H) 10/16/2019    RDW 20.0 (H) 10/09/2019    RDW 19.6 (H) 10/08/2019    MCHC 33.1 10/16/2019    MCHC 29.9 " (L) 10/09/2019    MCHC 30.2 (L) 10/08/2019    OCCULTBLD Positive (A) 10/06/2019    LABIRON 31 10/16/2019    LABIRON 8 (L) 10/05/2019    FERRITIN 52.10 10/16/2019    FERRITIN 47.07 10/05/2019     Lab Results   Component Value Date     10/21/2019    K 4.2 10/21/2019     10/21/2019    CO2 21.9 (L) 10/21/2019    BUN 9 10/21/2019    BUN 12 10/16/2019    BUN 19 10/11/2019    CREATININE 0.64 (L) 10/21/2019    CREATININE 0.66 (L) 10/16/2019    CREATININE 0.61 (L) 10/11/2019    EGFRIFNONA 132 10/21/2019    EGFRIFNONA 127 10/16/2019    EGFRIFNONA 139 10/11/2019    GLUCOSE 78 10/21/2019    CALCIUM 7.7 (L) 10/21/2019    ANIONGAP 9.1 10/21/2019     Lab Results   Component Value Date    AST 18 10/21/2019    AST 29 10/16/2019    AST 26 10/11/2019    ALT 23 10/21/2019    ALT 51 (H) 10/16/2019    ALT 16 10/11/2019    ALKPHOS 53 10/21/2019    ALKPHOS 72 10/16/2019    ALKPHOS 64 10/11/2019    BILITOT 0.3 10/21/2019    BILITOT 0.2 10/16/2019    BILITOT <0.2 (L) 10/11/2019    ALBUMIN 2.40 (L) 10/21/2019    ALBUMIN 2.40 (L) 10/16/2019    ALBUMIN 1.80 (L) 10/11/2019     Lab Results   Component Value Date    LIPASE 73 (H) 10/05/2019     Lab Results   Component Value Date    TSH 2.900 10/05/2019      Lab Results   Component Value Date    HEPBSAG Non-Reactive 10/06/2019    CRP 2.22 (H) 10/16/2019    CRP 4.15 (H) 10/05/2019    CDIFF Not Detected 10/05/2019       Wt Readings from Last 10 Encounters:   11/25/19 55.2 kg (121 lb 12.8 oz)   10/21/19 65.3 kg (144 lb)   10/16/19 73.3 kg (161 lb 9.6 oz)   10/09/19 64.4 kg (142 lb)     body mass index is 16.99 kg/m².,Temp: 97.3 °F (36.3 °C),BP: 100/60,Heart Rate: 54   Physical Exam  General Well developed; well nourished. NAD  ENT Anicteric sclerae. Oral mucosa pink and moist without thrush or lesions    GI Abd soft, NT, ND, normal active bowel sounds.  No HSM.  No abd hernia    Pt care team: Tawny CORTEZ & JOYCE Swann  11/25/19 2:03 PM  [facility]  Group--Gastroenterology  [phone]    CC: , Diego Hawley, APRN   [address] [address] / [facility] [address] FAX:[phone]

## 2019-12-13 ENCOUNTER — APPOINTMENT (OUTPATIENT)
Dept: GENERAL RADIOLOGY | Facility: HOSPITAL | Age: 49
End: 2019-12-13

## 2019-12-13 ENCOUNTER — APPOINTMENT (OUTPATIENT)
Dept: CT IMAGING | Facility: HOSPITAL | Age: 49
End: 2019-12-13

## 2019-12-13 ENCOUNTER — HOSPITAL ENCOUNTER (INPATIENT)
Facility: HOSPITAL | Age: 49
LOS: 9 days | Discharge: HOME | End: 2019-12-22
Attending: EMERGENCY MEDICINE | Admitting: HOSPITALIST

## 2019-12-13 DIAGNOSIS — I95.89 HYPOTENSION DUE TO HYPOVOLEMIA: Primary | ICD-10-CM

## 2019-12-13 DIAGNOSIS — K51.911 ULCERATIVE COLITIS WITH RECTAL BLEEDING, UNSPECIFIED LOCATION (HCC): ICD-10-CM

## 2019-12-13 DIAGNOSIS — E86.0 DEHYDRATION: ICD-10-CM

## 2019-12-13 DIAGNOSIS — E86.1 HYPOTENSION DUE TO HYPOVOLEMIA: Primary | ICD-10-CM

## 2019-12-13 PROBLEM — E87.20 LACTIC ACIDOSIS: Status: ACTIVE | Noted: 2019-12-13

## 2019-12-13 PROBLEM — E87.1 HYPONATREMIA: Status: ACTIVE | Noted: 2019-12-13

## 2019-12-13 PROBLEM — K51.90 ULCERATIVE COLITIS (HCC): Status: ACTIVE | Noted: 2019-10-05

## 2019-12-13 LAB
ABO GROUP BLD: NORMAL
ALBUMIN SERPL-MCNC: 1.6 G/DL (ref 3.5–5.2)
ALBUMIN/GLOB SERPL: 0.4 G/DL
ALP SERPL-CCNC: 152 U/L (ref 39–117)
ALT SERPL W P-5'-P-CCNC: 23 U/L (ref 1–41)
ANION GAP SERPL CALCULATED.3IONS-SCNC: 16 MMOL/L (ref 5–15)
APTT PPP: 33.4 SECONDS (ref 24–37)
ARTERIAL PATENCY WRIST A: ABNORMAL
AST SERPL-CCNC: 35 U/L (ref 1–40)
ATMOSPHERIC PRESS: ABNORMAL MM[HG]
BACTERIA UR QL AUTO: NORMAL /HPF
BASE EXCESS BLDA CALC-SCNC: -3.8 MMOL/L (ref 0–2)
BASOPHILS # BLD AUTO: 0.02 10*3/MM3 (ref 0–0.2)
BASOPHILS NFR BLD AUTO: 0.3 % (ref 0–1.5)
BDY SITE: ABNORMAL
BILIRUB SERPL-MCNC: 0.4 MG/DL (ref 0.2–1.2)
BILIRUB UR QL STRIP: NEGATIVE
BLD GP AB SCN SERPL QL: NEGATIVE
BODY TEMPERATURE: 37 C
BUN BLD-MCNC: 13 MG/DL (ref 6–20)
BUN BLDA-MCNC: 11 MG/DL (ref 8–26)
BUN/CREAT SERPL: 11.9 (ref 7–25)
C DIFF TOX GENS STL QL NAA+PROBE: NOT DETECTED
CA-I BLDA-SCNC: 1.05 MMOL/L (ref 1.2–1.32)
CA-I SERPL ISE-MCNC: 1.14 MMOL/L (ref 1.12–1.32)
CALCIUM SPEC-SCNC: 7.9 MG/DL (ref 8.6–10.5)
CHLORIDE BLDA-SCNC: 102 MMOL/L (ref 98–109)
CHLORIDE SERPL-SCNC: 95 MMOL/L (ref 98–107)
CLARITY UR: CLEAR
CO2 BLDA-SCNC: 20 MMOL/L (ref 24–29)
CO2 BLDA-SCNC: 20.3 MMOL/L (ref 22–33)
CO2 SERPL-SCNC: 19 MMOL/L (ref 22–29)
COHGB MFR BLD: 1.3 % (ref 0–2)
COLOR UR: YELLOW
CREAT BLD-MCNC: 1.09 MG/DL (ref 0.76–1.27)
CREAT BLDA-MCNC: 1 MG/DL (ref 0.6–1.3)
CRP SERPL-MCNC: 17.26 MG/DL (ref 0–0.5)
D-LACTATE SERPL-SCNC: 2.2 MMOL/L (ref 0.5–2)
D-LACTATE SERPL-SCNC: 5.1 MMOL/L (ref 0.5–2)
DEPRECATED RDW RBC AUTO: 53.8 FL (ref 37–54)
EOSINOPHIL # BLD AUTO: 0.01 10*3/MM3 (ref 0–0.4)
EOSINOPHIL NFR BLD AUTO: 0.1 % (ref 0.3–6.2)
ERYTHROCYTE [DISTWIDTH] IN BLOOD BY AUTOMATED COUNT: 19 % (ref 12.3–15.4)
FERRITIN SERPL-MCNC: 61.26 NG/ML (ref 30–400)
GFR SERPL CREATININE-BSD FRML MDRD: 71 ML/MIN/1.73
GLOBULIN UR ELPH-MCNC: 4.2 GM/DL
GLUCOSE BLD-MCNC: 117 MG/DL (ref 65–99)
GLUCOSE BLDC GLUCOMTR-MCNC: 105 MG/DL (ref 70–130)
GLUCOSE UR STRIP-MCNC: NEGATIVE MG/DL
HCO3 BLDA-SCNC: 19.5 MMOL/L (ref 20–26)
HCT VFR BLD AUTO: 26.1 % (ref 37.5–51)
HCT VFR BLD AUTO: 28.6 % (ref 37.5–51)
HCT VFR BLD CALC: 20.9 %
HCT VFR BLDA CALC: 21 % (ref 38–51)
HGB BLD-MCNC: 7.4 G/DL (ref 13–17.7)
HGB BLD-MCNC: 8.2 G/DL (ref 13–17.7)
HGB BLDA-MCNC: 6.8 G/DL (ref 13.5–17.5)
HGB BLDA-MCNC: 7.1 G/DL (ref 12–17)
HGB UR QL STRIP.AUTO: NEGATIVE
HOLD SPECIMEN: NORMAL
HOLD SPECIMEN: NORMAL
HOROWITZ INDEX BLD+IHG-RTO: 21 %
HYALINE CASTS UR QL AUTO: NORMAL /LPF
IMM GRANULOCYTES # BLD AUTO: 0.16 10*3/MM3 (ref 0–0.05)
IMM GRANULOCYTES NFR BLD AUTO: 2.1 % (ref 0–0.5)
INR PPP: 1.28 (ref 0.85–1.16)
IRON 24H UR-MRATE: 12 MCG/DL (ref 59–158)
IRON SATN MFR SERPL: 8 % (ref 20–50)
KETONES UR QL STRIP: NEGATIVE
LEUKOCYTE ESTERASE UR QL STRIP.AUTO: NEGATIVE
LYMPHOCYTES # BLD AUTO: 1.02 10*3/MM3 (ref 0.7–3.1)
LYMPHOCYTES NFR BLD AUTO: 13.4 % (ref 19.6–45.3)
Lab: ABNORMAL
MAGNESIUM SERPL-MCNC: 1.5 MG/DL (ref 1.6–2.6)
MCH RBC QN AUTO: 22.7 PG (ref 26.6–33)
MCHC RBC AUTO-ENTMCNC: 28.7 G/DL (ref 31.5–35.7)
MCV RBC AUTO: 79 FL (ref 79–97)
METHGB BLD QL: 1 % (ref 0–1.5)
MODALITY: ABNORMAL
MONOCYTES # BLD AUTO: 0.36 10*3/MM3 (ref 0.1–0.9)
MONOCYTES NFR BLD AUTO: 4.7 % (ref 5–12)
NEUTROPHILS # BLD AUTO: 6.04 10*3/MM3 (ref 1.7–7)
NEUTROPHILS NFR BLD AUTO: 79.4 % (ref 42.7–76)
NITRITE UR QL STRIP: NEGATIVE
NOTE: ABNORMAL
NOTIFIED BY: ABNORMAL
NOTIFIED WHO: ABNORMAL
NRBC BLD AUTO-RTO: 0 /100 WBC (ref 0–0.2)
OVALOCYTES BLD QL SMEAR: NORMAL
OXYHGB MFR BLDV: 96.4 % (ref 94–99)
PCO2 BLDA: 27.1 MM HG
PCO2 TEMP ADJ BLD: 27.1 MM HG (ref 35–48)
PH BLDA: 7.46 PH UNITS (ref 7.35–7.45)
PH UR STRIP.AUTO: 6 [PH] (ref 5–8)
PH, TEMP CORRECTED: 7.46 PH UNITS
PHOSPHATE SERPL-MCNC: 4.2 MG/DL (ref 2.5–4.5)
PLAT MORPH BLD: NORMAL
PLATELET # BLD AUTO: 547 10*3/MM3 (ref 140–450)
PMV BLD AUTO: 9 FL (ref 6–12)
PO2 BLDA: 108 MM HG (ref 83–108)
PO2 TEMP ADJ BLD: 108 MM HG (ref 83–108)
POTASSIUM BLD-SCNC: 4.2 MMOL/L (ref 3.5–5.2)
POTASSIUM BLDA-SCNC: 3.8 MMOL/L (ref 3.5–4.9)
PROCALCITONIN SERPL-MCNC: 0.54 NG/ML (ref 0.1–0.25)
PROT SERPL-MCNC: 5.8 G/DL (ref 6–8.5)
PROT UR QL STRIP: ABNORMAL
PROTHROMBIN TIME: 15.4 SECONDS (ref 11.2–14.3)
RBC # BLD AUTO: 3.62 10*6/MM3 (ref 4.14–5.8)
RBC # UR: NORMAL /HPF
REF LAB TEST METHOD: NORMAL
RH BLD: POSITIVE
SODIUM BLD-SCNC: 130 MMOL/L (ref 136–145)
SODIUM BLDA-SCNC: 134 MMOL/L (ref 138–146)
SP GR UR STRIP: 1.02 (ref 1–1.03)
SQUAMOUS #/AREA URNS HPF: NORMAL /HPF
T&S EXPIRATION DATE: NORMAL
TIBC SERPL-MCNC: 149 MCG/DL (ref 298–536)
TRANSFERRIN SERPL-MCNC: 100 MG/DL (ref 200–360)
UROBILINOGEN UR QL STRIP: ABNORMAL
VENTILATOR MODE: ABNORMAL
WBC MORPH BLD: NORMAL
WBC NRBC COR # BLD: 7.61 10*3/MM3 (ref 3.4–10.8)
WBC STL QL MICRO: ABNORMAL
WBC UR QL AUTO: NORMAL /HPF
WHOLE BLOOD HOLD SPECIMEN: NORMAL
WHOLE BLOOD HOLD SPECIMEN: NORMAL

## 2019-12-13 PROCEDURE — 83631 LACTOFERRIN FECAL (QUANT): CPT | Performed by: NURSE PRACTITIONER

## 2019-12-13 PROCEDURE — 84145 PROCALCITONIN (PCT): CPT | Performed by: NURSE PRACTITIONER

## 2019-12-13 PROCEDURE — P9016 RBC LEUKOCYTES REDUCED: HCPCS

## 2019-12-13 PROCEDURE — 36430 TRANSFUSION BLD/BLD COMPNT: CPT

## 2019-12-13 PROCEDURE — 85730 THROMBOPLASTIN TIME PARTIAL: CPT | Performed by: EMERGENCY MEDICINE

## 2019-12-13 PROCEDURE — 93005 ELECTROCARDIOGRAM TRACING: CPT | Performed by: EMERGENCY MEDICINE

## 2019-12-13 PROCEDURE — 71045 X-RAY EXAM CHEST 1 VIEW: CPT

## 2019-12-13 PROCEDURE — 99284 EMERGENCY DEPT VISIT MOD MDM: CPT

## 2019-12-13 PROCEDURE — 86923 COMPATIBILITY TEST ELECTRIC: CPT

## 2019-12-13 PROCEDURE — 83993 ASSAY FOR CALPROTECTIN FECAL: CPT | Performed by: NURSE PRACTITIONER

## 2019-12-13 PROCEDURE — 85014 HEMATOCRIT: CPT

## 2019-12-13 PROCEDURE — 81001 URINALYSIS AUTO W/SCOPE: CPT | Performed by: NURSE PRACTITIONER

## 2019-12-13 PROCEDURE — 86850 RBC ANTIBODY SCREEN: CPT | Performed by: EMERGENCY MEDICINE

## 2019-12-13 PROCEDURE — 99223 1ST HOSP IP/OBS HIGH 75: CPT | Performed by: INTERNAL MEDICINE

## 2019-12-13 PROCEDURE — 87147 CULTURE TYPE IMMUNOLOGIC: CPT | Performed by: EMERGENCY MEDICINE

## 2019-12-13 PROCEDURE — 84466 ASSAY OF TRANSFERRIN: CPT | Performed by: NURSE PRACTITIONER

## 2019-12-13 PROCEDURE — 25010000002 METHYLPREDNISOLONE PER 125 MG: Performed by: EMERGENCY MEDICINE

## 2019-12-13 PROCEDURE — 87205 SMEAR GRAM STAIN: CPT | Performed by: NURSE PRACTITIONER

## 2019-12-13 PROCEDURE — 86900 BLOOD TYPING SEROLOGIC ABO: CPT

## 2019-12-13 PROCEDURE — 84100 ASSAY OF PHOSPHORUS: CPT | Performed by: EMERGENCY MEDICINE

## 2019-12-13 PROCEDURE — 87385 HISTOPLASMA CAPSUL AG IA: CPT | Performed by: NURSE PRACTITIONER

## 2019-12-13 PROCEDURE — 86900 BLOOD TYPING SEROLOGIC ABO: CPT | Performed by: EMERGENCY MEDICINE

## 2019-12-13 PROCEDURE — 36600 WITHDRAWAL OF ARTERIAL BLOOD: CPT

## 2019-12-13 PROCEDURE — 74177 CT ABD & PELVIS W/CONTRAST: CPT

## 2019-12-13 PROCEDURE — 80047 BASIC METABLC PNL IONIZED CA: CPT

## 2019-12-13 PROCEDURE — 86901 BLOOD TYPING SEROLOGIC RH(D): CPT | Performed by: EMERGENCY MEDICINE

## 2019-12-13 PROCEDURE — 85014 HEMATOCRIT: CPT | Performed by: EMERGENCY MEDICINE

## 2019-12-13 PROCEDURE — 87150 DNA/RNA AMPLIFIED PROBE: CPT | Performed by: EMERGENCY MEDICINE

## 2019-12-13 PROCEDURE — 82330 ASSAY OF CALCIUM: CPT | Performed by: EMERGENCY MEDICINE

## 2019-12-13 PROCEDURE — 83735 ASSAY OF MAGNESIUM: CPT | Performed by: EMERGENCY MEDICINE

## 2019-12-13 PROCEDURE — 82728 ASSAY OF FERRITIN: CPT | Performed by: NURSE PRACTITIONER

## 2019-12-13 PROCEDURE — 83540 ASSAY OF IRON: CPT | Performed by: NURSE PRACTITIONER

## 2019-12-13 PROCEDURE — 82805 BLOOD GASES W/O2 SATURATION: CPT

## 2019-12-13 PROCEDURE — 85007 BL SMEAR W/DIFF WBC COUNT: CPT | Performed by: EMERGENCY MEDICINE

## 2019-12-13 PROCEDURE — 80053 COMPREHEN METABOLIC PANEL: CPT | Performed by: EMERGENCY MEDICINE

## 2019-12-13 PROCEDURE — 85610 PROTHROMBIN TIME: CPT | Performed by: EMERGENCY MEDICINE

## 2019-12-13 PROCEDURE — 86140 C-REACTIVE PROTEIN: CPT | Performed by: EMERGENCY MEDICINE

## 2019-12-13 PROCEDURE — 85025 COMPLETE CBC W/AUTO DIFF WBC: CPT | Performed by: EMERGENCY MEDICINE

## 2019-12-13 PROCEDURE — 25010000002 IOPAMIDOL 61 % SOLUTION: Performed by: INTERNAL MEDICINE

## 2019-12-13 PROCEDURE — 83605 ASSAY OF LACTIC ACID: CPT | Performed by: EMERGENCY MEDICINE

## 2019-12-13 PROCEDURE — 87493 C DIFF AMPLIFIED PROBE: CPT | Performed by: NURSE PRACTITIONER

## 2019-12-13 PROCEDURE — 0097U HC BIOFIRE FILMARRAY GI PANEL: CPT | Performed by: NURSE PRACTITIONER

## 2019-12-13 PROCEDURE — 85018 HEMOGLOBIN: CPT | Performed by: EMERGENCY MEDICINE

## 2019-12-13 PROCEDURE — 25010000002 LEVOFLOXACIN PER 250 MG: Performed by: INTERNAL MEDICINE

## 2019-12-13 PROCEDURE — 87040 BLOOD CULTURE FOR BACTERIA: CPT | Performed by: EMERGENCY MEDICINE

## 2019-12-13 RX ORDER — ONDANSETRON 2 MG/ML
4 INJECTION INTRAMUSCULAR; INTRAVENOUS EVERY 6 HOURS PRN
Status: DISCONTINUED | OUTPATIENT
Start: 2019-12-13 | End: 2019-12-22 | Stop reason: HOSPADM

## 2019-12-13 RX ORDER — SODIUM CHLORIDE 0.9 % (FLUSH) 0.9 %
10 SYRINGE (ML) INJECTION AS NEEDED
Status: DISCONTINUED | OUTPATIENT
Start: 2019-12-13 | End: 2019-12-22 | Stop reason: HOSPADM

## 2019-12-13 RX ORDER — L.ACID,CASEI/B.ANIMAL/S.THERMO 16B CELL
1 CAPSULE ORAL DAILY
COMMUNITY

## 2019-12-13 RX ORDER — ACETAMINOPHEN 160 MG/5ML
650 SOLUTION ORAL EVERY 4 HOURS PRN
Status: DISCONTINUED | OUTPATIENT
Start: 2019-12-13 | End: 2019-12-22 | Stop reason: HOSPADM

## 2019-12-13 RX ORDER — FERROUS SULFATE 325(65) MG
650 TABLET ORAL 2 TIMES DAILY
Status: DISCONTINUED | OUTPATIENT
Start: 2019-12-13 | End: 2019-12-22 | Stop reason: HOSPADM

## 2019-12-13 RX ORDER — SULFASALAZINE 500 MG/1
1000 TABLET ORAL EVERY 8 HOURS SCHEDULED
Status: DISCONTINUED | OUTPATIENT
Start: 2019-12-13 | End: 2019-12-22

## 2019-12-13 RX ORDER — ONDANSETRON 4 MG/1
4 TABLET, FILM COATED ORAL EVERY 6 HOURS PRN
Status: DISCONTINUED | OUTPATIENT
Start: 2019-12-13 | End: 2019-12-22 | Stop reason: HOSPADM

## 2019-12-13 RX ORDER — FOLIC ACID 1 MG/1
1 TABLET ORAL DAILY
COMMUNITY

## 2019-12-13 RX ORDER — METHYLPREDNISOLONE SODIUM SUCCINATE 125 MG/2ML
125 INJECTION, POWDER, LYOPHILIZED, FOR SOLUTION INTRAMUSCULAR; INTRAVENOUS EVERY 12 HOURS
Status: DISCONTINUED | OUTPATIENT
Start: 2019-12-14 | End: 2019-12-14

## 2019-12-13 RX ORDER — HYDROCODONE BITARTRATE AND ACETAMINOPHEN 5; 325 MG/1; MG/1
1 TABLET ORAL EVERY 4 HOURS PRN
Status: DISCONTINUED | OUTPATIENT
Start: 2019-12-13 | End: 2019-12-14

## 2019-12-13 RX ORDER — METHYLPREDNISOLONE SODIUM SUCCINATE 125 MG/2ML
125 INJECTION, POWDER, LYOPHILIZED, FOR SOLUTION INTRAMUSCULAR; INTRAVENOUS ONCE
Status: COMPLETED | OUTPATIENT
Start: 2019-12-13 | End: 2019-12-13

## 2019-12-13 RX ORDER — SODIUM CHLORIDE 9 MG/ML
100 INJECTION, SOLUTION INTRAVENOUS CONTINUOUS
Status: ACTIVE | OUTPATIENT
Start: 2019-12-13 | End: 2019-12-14

## 2019-12-13 RX ORDER — ACETAMINOPHEN 325 MG/1
650 TABLET ORAL EVERY 4 HOURS PRN
Status: DISCONTINUED | OUTPATIENT
Start: 2019-12-13 | End: 2019-12-22 | Stop reason: HOSPADM

## 2019-12-13 RX ORDER — SODIUM CHLORIDE 0.9 % (FLUSH) 0.9 %
10 SYRINGE (ML) INJECTION EVERY 12 HOURS SCHEDULED
Status: DISCONTINUED | OUTPATIENT
Start: 2019-12-13 | End: 2019-12-22 | Stop reason: HOSPADM

## 2019-12-13 RX ORDER — ACETAMINOPHEN 650 MG/1
650 SUPPOSITORY RECTAL EVERY 4 HOURS PRN
Status: DISCONTINUED | OUTPATIENT
Start: 2019-12-13 | End: 2019-12-22 | Stop reason: HOSPADM

## 2019-12-13 RX ORDER — LEVOFLOXACIN 5 MG/ML
750 INJECTION, SOLUTION INTRAVENOUS EVERY 24 HOURS
Status: DISCONTINUED | OUTPATIENT
Start: 2019-12-13 | End: 2019-12-14

## 2019-12-13 RX ADMIN — LEVOFLOXACIN 750 MG: 750 INJECTION, SOLUTION INTRAVENOUS at 20:31

## 2019-12-13 RX ADMIN — FERROUS SULFATE TAB 325 MG (65 MG ELEMENTAL FE) 650 MG: 325 (65 FE) TAB at 20:30

## 2019-12-13 RX ADMIN — METHYLPREDNISOLONE SODIUM SUCCINATE 125 MG: 125 INJECTION, POWDER, FOR SOLUTION INTRAMUSCULAR; INTRAVENOUS at 14:34

## 2019-12-13 RX ADMIN — SULFASALAZINE 1000 MG: 500 TABLET ORAL at 20:32

## 2019-12-13 RX ADMIN — SODIUM CHLORIDE 100 ML/HR: 9 INJECTION, SOLUTION INTRAVENOUS at 18:02

## 2019-12-13 RX ADMIN — SODIUM CHLORIDE 1000 ML: 9 INJECTION, SOLUTION INTRAVENOUS at 13:15

## 2019-12-13 RX ADMIN — IOPAMIDOL 90 ML: 612 INJECTION, SOLUTION INTRAVENOUS at 21:45

## 2019-12-13 RX ADMIN — SODIUM CHLORIDE 1000 ML: 9 INJECTION, SOLUTION INTRAVENOUS at 13:28

## 2019-12-13 RX ADMIN — SODIUM CHLORIDE, PRESERVATIVE FREE 10 ML: 5 INJECTION INTRAVENOUS at 21:00

## 2019-12-13 RX ADMIN — SODIUM CHLORIDE 1000 ML: 9 INJECTION, SOLUTION INTRAVENOUS at 15:54

## 2019-12-13 NOTE — H&P
"    [facility] Medicine Services  HISTORY AND PHYSICAL    Patient Name: Rafa Dan  : 1970  MRN: [id number]  Primary Care Physician: Diego García APRN  Date of admission: 2019      Subjective   Subjective     Chief Complaint:  diarrhea    HPI:  Rafa Dan is a 49 y.o. male with PMH of prior tobacco abuse and Ulcerative Colitis diagnosed in 2019 who presents with several weeks of diarrhea and unintentional weight loss. Pt reports that he was here in October for the same and, after discharge, his symptoms initially improved but, over the past three weeks, his 10x/day BMs have increased to now 30x daily.  His symptoms wake him from sleep. He notes occasional BRBPR but not \"every time\". He denies F/C, only occasionally (once a week) notes N/V.  He notes that he has been compliant with his Sulfasalazine.  He last saw GI about a week prior to his symptoms flaring and no changes were made to his medications.  Of note, he denies any recent antibiotic use.      Review of Systems   General- as above  HEENT- no blurry vision, no nasal congestion, no hearing loss, no sore throat, no dysphagia, no oral ulcers, no dental caries, no bleeding gums  CVS- no chest pain, no palpitations  Pulm- no SOA, no cough, no orthopnea, no PND  GI- as above  MSK- no joint pain, no swelling, no erythema  - no dysuria, no hematuria  Neuro- no headaches, no focal motor deficits  Psych- no SI/ HI, no depression/anxiety    All other systems reviewed and are negative.     Personal History     Past Medical History:   Diagnosis Date   • Colitis    • Ulcerative colitis (CMS/HCC)        Past Surgical History:   Procedure Laterality Date   • COLONOSCOPY N/A 10/7/2019    Procedure: COLONOSCOPY;  Surgeon: Brunner, Mark I, MD;  Location: [facility] ENDOSCOPY;  Service: Gastroenterology       Family History: Family history is unknown by patient. Otherwise pertinent FHx was reviewed and " unremarkable.     Social History:  reports that he has quit smoking. He smoked 2.0 packs per day for 15.00 years. He has never used smokeless tobacco. He reports that he has current or past drug history. Drug: Marijuana. Frequency: 7.00 times per week. He reports that he does not drink alcohol. He works as a  at [employer].       Medications:  No current facility-administered medications on file prior to encounter.      Current Outpatient Medications on File Prior to Encounter   Medication Sig Dispense Refill   • ferrous sulfate 325 (65 FE) MG tablet Take 2 tablets by mouth 2 (Two) Times a Day. Take with OTC Vitamin C 500 mg 1 hour before or 2 hours after meals to enhance absorption 120 tablet 2   • sulfaSALAzine (AZULFIDINE) 500 MG EC tablet Take 2 tablets by mouth 3 (Three) Times a Day. 540 tablet 2   • vitamin C (ASCORBIC ACID) 500 MG tablet Take 1 tablet by mouth 2 (Two) Times a Day. 120 tablet 2   • vitamin D (ERGOCALCIFEROL) 1.25 MG (59773 UT) capsule capsule Take 1 capsule by mouth 1 (One) Time Per Week for 8 doses. 8 capsule 0   • Cyanocobalamin 1000 MCG sublingual tablet Place 1 tablet under the tongue Daily. 90 each 3       Available home medication information reviewed.    No Known Allergies    Objective   Objective     Vital Signs:   Temp:  [97.3 °F (36.3 °C)] 97.3 °F (36.3 °C)  Heart Rate:  [144] 144  Resp:  [16] 16  BP: (82)/(58) 82/58        Physical Exam   Constitutional: Awake, alert, extremely cachetic appearing WM  Eyes: PERRLA, sclerae anicteric, no conjunctival injection  HENT: NCAT, mucous membranes dry  Neck: Supple, no thyromegaly, no lymphadenopathy, trachea midline  Respiratory: Clear to auscultation bilaterally, nonlabored respirations   Cardiovascular: RRR, no murmurs, rubs, or gallops, palpable pedal pulses bilaterally  Gastrointestinal: hyperactive bowel sounds, soft, nontender, nondistended  Musculoskeletal: No bilateral ankle edema, no clubbing or cyanosis to  extremities  Psychiatric: Appropriate affect, cooperative  Neurologic: Oriented x 3, strength symmetric in all extremities, Cranial Nerves grossly intact to confrontation, speech clear  Skin: No rashes    Results Reviewed:  I have personally reviewed current lab and radiology data.    Results from last 7 days   Lab Units 12/13/19  1315   WBC 10*3/mm3 7.61   HEMOGLOBIN g/dL 8.2*   HEMATOCRIT % 28.6*   PLATELETS 10*3/mm3 547*   INR  1.28*     Results from last 7 days   Lab Units 12/13/19  1315   SODIUM mmol/L 130*   POTASSIUM mmol/L 4.2   CHLORIDE mmol/L 95*   CO2 mmol/L 19.0*   BUN mg/dL 13   CREATININE mg/dL 1.09   GLUCOSE mg/dL 117*   CALCIUM mg/dL 7.9*   ALT (SGPT) U/L 23   AST (SGOT) U/L 35   LACTATE mmol/L 5.1*     Estimated Creatinine Clearance: 59.2 mL/min (by C-G formula based on SCr of 1.09 mg/dL).  Brief Urine Lab Results  (Last result in the past 365 days)      Color   Clarity   Blood   Leuk Est   Nitrite   Protein   CREAT   Urine HCG        10/05/19 1848 Dark Yellow Clear Negative Negative Negative Trace             Imaging Results (Last 24 Hours)     Procedure Component Value Units Date/Time    XR Chest 1 View [[id number]] Collected:  12/13/19 1311     Updated:  12/13/19 1405    Narrative:       EXAMINATION: XR CHEST 1 VW- 12/13/2019     INDICATION: Severe Sepsis triage protocol      COMPARISON: NONE     FINDINGS: Single portable chest radiograph was submitted for review.    Two acquisitions imaging the entire chest.      The heart is not enlarged. Chronic changes of the lungs are identified  without evidence of active airspace disease. Visualized upper abdomen is  unrevealing.  No acute osseous abnormality identified.        Impression:       1. Chronic appearing emphysematous changes of the lungs without evidence  for acute or active air space disease.     2. Bullous changes in the right lung apex are suggested.     D:  12/13/2019  E:  12/13/2019                   Assessment/Plan   Assessment / Plan      Active Hospital Problems    Diagnosis POA   • Lactic acidosis [E87.2] Unknown     Priority: High   • Intractable diarrhea [R19.7] Yes     Priority: High   • Ulcerative colitis (CMS/HCC) [K51.90] Unknown     Priority: High   • Hyponatremia [E87.1] Unknown     Priority: Medium   • Unintentional weight loss [R63.4] Yes     Priority: Medium   • Thrombocytosis (CMS/HCC) [D47.3] Yes     Priority: Medium   • Iron deficiency anemia [D50.9] Yes     Priority: Medium       Mr. Dan is a 50 yo WM w/ PMH of prior tobacco abuse and relatively recent diagnosis of Ulcerative Colitis who presents with intractable diarrhea and unintentional weight loss concerning for Ulcerative Colitis flare.    Plan:    Intractable Diarrhea  H/o UC  --likely UC flare  --check stool for infectious etiology  --started on IV Solumedrol  --Start Levaquin/Flagyl for now  --consult GI (contacted by ED already)  --clear liquids for now, NPO after MN in case procedure is warranted  --continue Sulfasalazine per home regimen    Lactic Acidosis  --repeat lactate PENDING  --likely due to volume depletion  --start IVFs at 100cc/hr for 24 hours    Hyponatremia  --likely hypovolemic hyponatremia  --mild  --monitor/repeat BMP in am  --IVFs as above    H/o Fe Deficiency Anemia  --continue Fe replacement  --monitor H&H and transfuse PRN HgB<7    Thrombocytosis  --likely reactive due to above  --monitor    DVT prophylaxis:  mechanical    CODE STATUS:    Code Status and Medical Interventions:   Ordered at: 12/13/19 1521     Level Of Support Discussed With:    Patient     Code Status:    CPR     Medical Interventions (Level of Support Prior to Arrest):    Full       Admission Status:  I believe this patient meets INPATIENT status due to pt failing conservative outpatient treatment and unable to maintain PO intake.   I feel patient’s risk for adverse outcomes and need for care warrant INPATIENT evaluation and I predict the patient’s care encounter to likely last  beyond 2 midnights.    Electronically signed by Jaclyn Rey MD, 12/13/19, 3:24 PM.

## 2019-12-13 NOTE — ED PROVIDER NOTES
Subjective   Rafa Dan is a 49 y.o. male who presents to the ED with c/o diarrhea. The patient was diagnosed with ulcerative colitis at the end of June 2019. He states that he reported to the ED in October and was admitted. He reportedly left feeling better but he states that he has been experiencing worsening diarrhea and weakness in the last 2-3 weeks. He reports having approximately 30-40 bowel movements in the last 24 hours. He complains of intermittent hematochezia, chills, intermittent abdominal pain, shortness of breath, and lightheadedness, but denies a subjective fever. He states that he has not noticed any blood in his stool today. The patient states that he weighed approximately 150 pounds in June, 140 pounds in October, and currently weighs 110 pounds. The patient states that his gastroenterologist, Dr. Whitten, saw him three weeks ago and she advised the patient to see him again if he had not gained 10-15 pounds before 12/25/19. He notes that Dr. Whitten is unaware of his current ED visit. He states that he has not been on steroids since October. He denies a social history of smoking. There are no other acute complaints at this time.         History provided by:  Patient  Diarrhea   The primary symptoms include abdominal pain and diarrhea. Primary symptoms do not include fatigue. The illness began more than 7 days ago. The onset was gradual. The problem has been gradually worsening.   The illness is also significant for chills.       Review of Systems   Constitutional: Positive for chills. Negative for fatigue.   Respiratory: Positive for shortness of breath.    Gastrointestinal: Positive for abdominal pain, blood in stool and diarrhea.   Neurological: Positive for light-headedness.   All other systems reviewed and are negative.      Past Medical History:   Diagnosis Date   • Colitis    • Ulcerative colitis (CMS/[facility])        No Known Allergies    Past Surgical History:   Procedure Laterality  Date   • COLONOSCOPY N/A 10/7/2019    Procedure: COLONOSCOPY;  Surgeon: Brunner, Mark I, MD;  Location: [facility] ENDOSCOPY;  Service: Gastroenterology       Family History   Family history unknown: Yes       Social History     Socioeconomic History   • Marital status: Single     Spouse name: Not on file   • Number of children: Not on file   • Years of education: Not on file   • Highest education level: Not on file   Tobacco Use   • Smoking status: Former Smoker     Packs/day: 0.00     Years: 15.00     Pack years: 0.00   • Smokeless tobacco: Never Used   • Tobacco comment: quit 4 yearse ago    Substance and Sexual Activity   • Alcohol use: No     Frequency: Never   • Drug use: Yes     Frequency: 7.0 times per week     Types: Marijuana     Comment: Stopped 4 years ago.         Objective   Physical Exam   Constitutional: He is oriented to person, place, and time. He appears well-developed and well-nourished. No distress.   The patient appears weak and cachectic.    HENT:   Head: Normocephalic and atraumatic.   Nose: Nose normal.   Eyes: Conjunctivae are normal. No scleral icterus.   Neck: Normal range of motion. Neck supple.   Cardiovascular: Regular rhythm and normal heart sounds. Tachycardia present.   Pulses:       Radial pulses are 2+ on the right side, and 2+ on the left side.   Pulmonary/Chest: Effort normal and breath sounds normal. No respiratory distress.   Abdominal: Soft. Bowel sounds are normal. He exhibits no mass. There is no tenderness.   Abdomen is scaphoid and nontender. There are no masses present. Bowel sounds are within normal limits.    Musculoskeletal: Normal range of motion.   Neurological: He is alert and oriented to person, place, and time.   Skin: Skin is warm and dry.   Psychiatric: He has a normal mood and affect. His behavior is normal.   Nursing note and vitals reviewed.      Critical Care  Performed by: Obinna Foote MD  Authorized by: Obinna Foote MD     Critical care provider  statement:     Critical care time (minutes):  35    Critical care time was exclusive of:  Separately billable procedures and treating other patients    Critical care was necessary to treat or prevent imminent or life-threatening deterioration of the following conditions:  Circulatory failure    Critical care was time spent personally by me on the following activities:  Ordering and performing treatments and interventions, ordering and review of laboratory studies, ordering and review of radiographic studies, pulse oximetry, re-evaluation of patient's condition, review of old charts, obtaining history from patient or surrogate, examination of patient, evaluation of patient's response to treatment, discussions with consultants and development of treatment plan with patient or surrogate  Comments:      IV NS boluses x 4 liters secondary to profound volume depletion and persistent hypotension/tachycardia.             ED Course  ED Course as of Dec 13 1610   Fri Dec 13, 2019   1339 The patient's current vital signs are as follows:  Supine position- blood pressure of 83/60 and heart rate of 111  Sitting- blood pressure of 97/63 and heart rate of 122  Standing- blood pressure of 81/65 and heart rate of 161      [SK]   1351 Dr. Foote paged Dr. Tawny Whitten, the patient's gastroenterologist.     [SK]   1400 I spoke with Dr. Whitten the patient's gastroenterologist.  She recommends Solu-Medrol 125 mg IV.  We are infusing 2 L of normal saline as well.  We will will admit.    [MS]   0732 Dr. Foote discussed the case in detail with Dr. Rey, hospitalist, who plans to admit the patient for further care and observation.    [SK]      ED Course User Index  [MS] Obinna Foote MD  [SK] Camilla Pinzon     Recent Results (from the past 24 hour(s))   Comprehensive Metabolic Panel    Collection Time: 12/13/19  1:15 PM   Result Value Ref Range    Glucose 117 (H) 65 - 99 mg/dL    BUN 13 6 - 20 mg/dL    Creatinine 1.09 0.76 -  1.27 mg/dL    Sodium 130 (L) 136 - 145 mmol/L    Potassium 4.2 3.5 - 5.2 mmol/L    Chloride 95 (L) 98 - 107 mmol/L    CO2 19.0 (L) 22.0 - 29.0 mmol/L    Calcium 7.9 (L) 8.6 - 10.5 mg/dL    Total Protein 5.8 (L) 6.0 - 8.5 g/dL    Albumin 1.60 (L) 3.50 - 5.20 g/dL    ALT (SGPT) 23 1 - 41 U/L    AST (SGOT) 35 1 - 40 U/L    Alkaline Phosphatase 152 (H) 39 - 117 U/L    Total Bilirubin 0.4 0.2 - 1.2 mg/dL    eGFR Non African Amer 71 >60 mL/min/1.73    Globulin 4.2 gm/dL    A/G Ratio 0.4 g/dL    BUN/Creatinine Ratio 11.9 7.0 - 25.0    Anion Gap 16.0 (H) 5.0 - 15.0 mmol/L   Protime-INR    Collection Time: 12/13/19  1:15 PM   Result Value Ref Range    Protime 15.4 (H) 11.2 - 14.3 Seconds    INR 1.28 (H) 0.85 - 1.16   aPTT    Collection Time: 12/13/19  1:15 PM   Result Value Ref Range    PTT 33.4 24.0 - 37.0 seconds   Magnesium    Collection Time: 12/13/19  1:15 PM   Result Value Ref Range    Magnesium 1.5 (L) 1.6 - 2.6 mg/dL   Phosphorus    Collection Time: 12/13/19  1:15 PM   Result Value Ref Range    Phosphorus 4.2 2.5 - 4.5 mg/dL   Calcium, Ionized    Collection Time: 12/13/19  1:15 PM   Result Value Ref Range    Ionized Calcium 1.14 1.12 - 1.32 mmol/L   Lactic Acid, Plasma    Collection Time: 12/13/19  1:15 PM   Result Value Ref Range    Lactate 5.1 (C) 0.5 - 2.0 mmol/L   C-reactive Protein    Collection Time: 12/13/19  1:15 PM   Result Value Ref Range    C-Reactive Protein 17.26 (H) 0.00 - 0.50 mg/dL   Light Blue Top    Collection Time: 12/13/19  1:15 PM   Result Value Ref Range    Extra Tube hold for add-on    Green Top (Gel)    Collection Time: 12/13/19  1:15 PM   Result Value Ref Range    Extra Tube Hold for add-ons.    Lavender Top    Collection Time: 12/13/19  1:15 PM   Result Value Ref Range    Extra Tube hold for add-on    CBC Auto Differential    Collection Time: 12/13/19  1:15 PM   Result Value Ref Range    WBC 7.61 3.40 - 10.80 10*3/mm3    RBC 3.62 (L) 4.14 - 5.80 10*6/mm3    Hemoglobin 8.2 (L) 13.0 - 17.7  g/dL    Hematocrit 28.6 (L) 37.5 - 51.0 %    MCV 79.0 79.0 - 97.0 fL    MCH 22.7 (L) 26.6 - 33.0 pg    MCHC 28.7 (L) 31.5 - 35.7 g/dL    RDW 19.0 (H) 12.3 - 15.4 %    RDW-SD 53.8 37.0 - 54.0 fl    MPV 9.0 6.0 - 12.0 fL    Platelets 547 (H) 140 - 450 10*3/mm3    Neutrophil % 79.4 (H) 42.7 - 76.0 %    Lymphocyte % 13.4 (L) 19.6 - 45.3 %    Monocyte % 4.7 (L) 5.0 - 12.0 %    Eosinophil % 0.1 (L) 0.3 - 6.2 %    Basophil % 0.3 0.0 - 1.5 %    Immature Grans % 2.1 (H) 0.0 - 0.5 %    Neutrophils, Absolute 6.04 1.70 - 7.00 10*3/mm3    Lymphocytes, Absolute 1.02 0.70 - 3.10 10*3/mm3    Monocytes, Absolute 0.36 0.10 - 0.90 10*3/mm3    Eosinophils, Absolute 0.01 0.00 - 0.40 10*3/mm3    Basophils, Absolute 0.02 0.00 - 0.20 10*3/mm3    Immature Grans, Absolute 0.16 (H) 0.00 - 0.05 10*3/mm3    nRBC 0.0 0.0 - 0.2 /100 WBC   Scan Slide    Collection Time: 12/13/19  1:15 PM   Result Value Ref Range    Ovalocytes Slight/1+ None Seen    WBC Morphology Normal Normal    Platelet Morphology Normal Normal   Procalcitonin    Collection Time: 12/13/19  1:15 PM   Result Value Ref Range    Procalcitonin 0.54 (H) 0.10 - 0.25 ng/mL   Iron Profile    Collection Time: 12/13/19  1:15 PM   Result Value Ref Range    Iron 12 (L) 59 - 158 mcg/dL    Iron Saturation 8 (L) 20 - 50 %    Transferrin 100 (L) 200 - 360 mg/dL    TIBC 149 (L) 298 - 536 mcg/dL   Ferritin    Collection Time: 12/13/19  1:15 PM   Result Value Ref Range    Ferritin 61.26 30.00 - 400.00 ng/mL   Blood Gas, Arterial With Co-Ox    Collection Time: 12/13/19  2:33 PM   Result Value Ref Range    Site Right Radial     Horacio's Test N/A     pH, Arterial 7.465 (H) 7.350 - 7.450 pH units    pCO2, Arterial 27.1 mm Hg    pO2, Arterial 108.0 83.0 - 108.0 mm Hg    HCO3, Arterial 19.5 (L) 20.0 - 26.0 mmol/L    Base Excess, Arterial -3.8 (L) 0.0 - 2.0 mmol/L    Hemoglobin, Blood Gas 6.8 (C) 13.5 - 17.5 g/dL    Hematocrit, Blood Gas 20.9 %    Oxyhemoglobin 96.4 94 - 99 %    Methemoglobin 1.00 0.00 -  1.50 %    Carboxyhemoglobin 1.3 0 - 2 %    CO2 Content 20.3 (L) 22 - 33 mmol/L    Temperature 37.0 C    Barometric Pressure for Blood Gas      Modality Room Air     FIO2 21 %    Ventilator Mode       Note      Notified Who C. BOARD RN     Notified By [id number]     Notified Time 12/13/2019 14:43     pH, Temp Corrected 7.465 pH Units    pCO2, Temperature Corrected 27.1 (L) 35 - 48 mm Hg    pO2, Temperature Corrected 108 83 - 108 mm Hg   Urinalysis With Culture If Indicated - Urine, Clean Catch    Collection Time: 12/13/19  3:32 PM   Result Value Ref Range    Color, UA Yellow Yellow, Straw    Appearance, UA Clear Clear    pH, UA 6.0 5.0 - 8.0    Specific Gravity, UA 1.023 1.001 - 1.030    Glucose, UA Negative Negative    Ketones, UA Negative Negative    Bilirubin, UA Negative Negative    Blood, UA Negative Negative    Protein, UA 30 mg/dL (1+) (A) Negative    Leuk Esterase, UA Negative Negative    Nitrite, UA Negative Negative    Urobilinogen, UA 0.2 E.U./dL 0.2 - 1.0 E.U./dL   Urinalysis, Microscopic Only - Urine, Clean Catch    Collection Time: 12/13/19  3:32 PM   Result Value Ref Range    RBC, UA 0-2 None Seen, 0-2 /HPF    WBC, UA 0-2 None Seen, 0-2 /HPF    Bacteria, UA None Seen None Seen, Trace /HPF    Squamous Epithelial Cells, UA 0-2 None Seen, 0-2 /HPF    Hyaline Casts, UA 7-12 0 - 6 /LPF    Methodology Automated Microscopy      Note: In addition to lab results from this visit, the labs listed above may include labs taken at another facility or during a different encounter within the last 24 hours. Please correlate lab times with ED admission and discharge times for further clarification of the services performed during this visit.    XR Chest 1 View   Preliminary Result   1. Chronic appearing emphysematous changes of the lungs without evidence   for acute or active air space disease.       2. Bullous changes in the right lung apex are suggested.       D:  12/13/2019   E:  12/13/2019                Vitals:     12/13/19 1253 12/13/19 1500 12/13/19 1530 12/13/19 1556   BP: 107/74 (!) 88/58 (!) 87/52 91/58   BP Location:       Patient Position:       Pulse:  96 92 92   Resp:       Temp:       TempSrc:       SpO2:       Weight:       Height:         Medications   sodium chloride 0.9 % flush 10 mL (has no administration in time range)   sodium chloride 0.9 % bolus 1,000 mL (1,000 mL Intravenous New Bag 12/13/19 1554)   sodium chloride 0.9 % bolus 1,000 mL (1,000 mL Intravenous New Bag 12/13/19 1554)   sodium chloride 0.9 % bolus 1,000 mL (0 mL Intravenous Stopped 12/13/19 1445)   sodium chloride 0.9 % bolus 1,000 mL (0 mL Intravenous Stopped 12/13/19 1500)   methylPREDNISolone sodium succinate (SOLU-Medrol) injection 125 mg (125 mg Intravenous Given 12/13/19 1434)     ECG/EMG Results (last 24 hours)     Procedure Component Value Units Date/Time    ECG 12 Lead [[id number]] Collected:  12/13/19 1300     Updated:  12/13/19 1303        ECG 12 Lead   Final Result   Test Reason : SHIREMAN   Blood Pressure : **/** mmHG   Vent. Rate : 116 BPM     Atrial Rate : 116 BPM      P-R Int : 136 ms          QRS Dur : 074 ms       QT Int : 316 ms       P-R-T Axes : 078 099 060 degrees      QTc Int : 439 ms      ** Poor data quality, interpretation may be adversely affected   Sinus tachycardia   Rightward axis   Borderline ECG   No previous ECGs available   Confirmed by SYMONE SANDOVAL MD ([id number]) on 12/14/2019 1:54:24 PM      Referred By:  CICI BOOGIE           Confirmed By:SYMONE SANDOVAL MD                          [facility] [place]    Final diagnoses:   Hypotension due to hypovolemia   Dehydration   Ulcerative colitis with rectal bleeding, unspecified location (CMS/[facility])       Documentation assistance provided by cherry Pinzon.  Information recorded by the scribe was done at my direction and has been verified and validated by me.     Camilla Pinzon  12/13/19 0564       Symone Sandoval MD  12/15/19 0733

## 2019-12-14 LAB
ABO + RH BLD: NORMAL
ADV 40+41 DNA STL QL NAA+NON-PROBE: NOT DETECTED
ANION GAP SERPL CALCULATED.3IONS-SCNC: 8 MMOL/L (ref 5–15)
ASTRO TYP 1-8 RNA STL QL NAA+NON-PROBE: NOT DETECTED
BH BB BLOOD EXPIRATION DATE: NORMAL
BH BB BLOOD TYPE BARCODE: 6200
BH BB DISPENSE STATUS: NORMAL
BH BB PRODUCT CODE: NORMAL
BH BB UNIT NUMBER: NORMAL
BUN BLD-MCNC: 11 MG/DL (ref 6–20)
BUN/CREAT SERPL: 13.1 (ref 7–25)
C CAYETANENSIS DNA STL QL NAA+NON-PROBE: NOT DETECTED
CALCIUM SPEC-SCNC: 6.9 MG/DL (ref 8.6–10.5)
CAMPY SP DNA.DIARRHEA STL QL NAA+PROBE: NOT DETECTED
CHLORIDE SERPL-SCNC: 102 MMOL/L (ref 98–107)
CHOLEST SERPL-MCNC: 56 MG/DL (ref 0–200)
CO2 SERPL-SCNC: 21 MMOL/L (ref 22–29)
CREAT BLD-MCNC: 0.84 MG/DL (ref 0.76–1.27)
CRYPTOSP STL CULT: NOT DETECTED
D-LACTATE SERPL-SCNC: 1.1 MMOL/L (ref 0.5–2)
DEPRECATED RDW RBC AUTO: 52.9 FL (ref 37–54)
E COLI DNA SPEC QL NAA+PROBE: NOT DETECTED
E HISTOLYT AG STL-ACNC: NOT DETECTED
EAEC PAA PLAS AGGR+AATA ST NAA+NON-PRB: NOT DETECTED
EC STX1 + STX2 GENES STL NAA+PROBE: NOT DETECTED
EPEC EAE GENE STL QL NAA+NON-PROBE: NOT DETECTED
ERYTHROCYTE [DISTWIDTH] IN BLOOD BY AUTOMATED COUNT: 18.2 % (ref 12.3–15.4)
ETEC LTA+ST1A+ST1B TOX ST NAA+NON-PROBE: NOT DETECTED
G LAMBLIA DNA SPEC QL NAA+PROBE: NOT DETECTED
GFR SERPL CREATININE-BSD FRML MDRD: 96 ML/MIN/1.73
GLUCOSE BLD-MCNC: 140 MG/DL (ref 65–99)
HBA1C MFR BLD: 5.1 % (ref 4.8–5.6)
HCT VFR BLD AUTO: 24.6 % (ref 37.5–51)
HCT VFR BLD AUTO: 35.4 % (ref 37.5–51)
HDLC SERPL-MCNC: 32 MG/DL (ref 40–60)
HGB BLD-MCNC: 11.1 G/DL (ref 13–17.7)
HGB BLD-MCNC: 7.3 G/DL (ref 13–17.7)
LACTOFERRIN STL QL LA: POSITIVE
LDLC SERPL CALC-MCNC: 16 MG/DL (ref 0–100)
LDLC/HDLC SERPL: 0.51 {RATIO}
MCH RBC QN AUTO: 24.2 PG (ref 26.6–33)
MCHC RBC AUTO-ENTMCNC: 29.7 G/DL (ref 31.5–35.7)
MCV RBC AUTO: 81.5 FL (ref 79–97)
NOROVIRUS GI+II RNA STL QL NAA+NON-PROBE: NOT DETECTED
P SHIGELLOIDES DNA STL QL NAA+PROBE: NOT DETECTED
PLATELET # BLD AUTO: 402 10*3/MM3 (ref 140–450)
PMV BLD AUTO: 9.4 FL (ref 6–12)
POTASSIUM BLD-SCNC: 4.2 MMOL/L (ref 3.5–5.2)
RBC # BLD AUTO: 3.02 10*6/MM3 (ref 4.14–5.8)
RV RNA STL NAA+PROBE: NOT DETECTED
SALMONELLA DNA SPEC QL NAA+PROBE: NOT DETECTED
SAPO I+II+IV+V RNA STL QL NAA+NON-PROBE: NOT DETECTED
SHIGELLA SP+EIEC IPAH STL QL NAA+PROBE: NOT DETECTED
SODIUM BLD-SCNC: 131 MMOL/L (ref 136–145)
TRIGL SERPL-MCNC: 38 MG/DL (ref 0–150)
TSH SERPL DL<=0.05 MIU/L-ACNC: 1.36 UIU/ML (ref 0.27–4.2)
UNIT  ABO: NORMAL
UNIT  RH: NORMAL
V CHOLERAE DNA SPEC QL NAA+PROBE: NOT DETECTED
VIBRIO DNA SPEC NAA+PROBE: NOT DETECTED
VLDLC SERPL-MCNC: 7.6 MG/DL
WBC NRBC COR # BLD: 4.99 10*3/MM3 (ref 3.4–10.8)
YERSINIA STL CULT: NOT DETECTED

## 2019-12-14 PROCEDURE — 85018 HEMOGLOBIN: CPT | Performed by: HOSPITALIST

## 2019-12-14 PROCEDURE — P9016 RBC LEUKOCYTES REDUCED: HCPCS

## 2019-12-14 PROCEDURE — 36430 TRANSFUSION BLD/BLD COMPNT: CPT

## 2019-12-14 PROCEDURE — 86900 BLOOD TYPING SEROLOGIC ABO: CPT

## 2019-12-14 PROCEDURE — 25010000002 METHYLPREDNISOLONE PER 125 MG: Performed by: INTERNAL MEDICINE

## 2019-12-14 PROCEDURE — 80061 LIPID PANEL: CPT | Performed by: INTERNAL MEDICINE

## 2019-12-14 PROCEDURE — 84443 ASSAY THYROID STIM HORMONE: CPT | Performed by: INTERNAL MEDICINE

## 2019-12-14 PROCEDURE — 83605 ASSAY OF LACTIC ACID: CPT | Performed by: INTERNAL MEDICINE

## 2019-12-14 PROCEDURE — 99233 SBSQ HOSP IP/OBS HIGH 50: CPT | Performed by: HOSPITALIST

## 2019-12-14 PROCEDURE — 85014 HEMATOCRIT: CPT | Performed by: HOSPITALIST

## 2019-12-14 PROCEDURE — 83036 HEMOGLOBIN GLYCOSYLATED A1C: CPT | Performed by: INTERNAL MEDICINE

## 2019-12-14 PROCEDURE — 85027 COMPLETE CBC AUTOMATED: CPT | Performed by: INTERNAL MEDICINE

## 2019-12-14 PROCEDURE — 80048 BASIC METABOLIC PNL TOTAL CA: CPT | Performed by: INTERNAL MEDICINE

## 2019-12-14 PROCEDURE — 99253 IP/OBS CNSLTJ NEW/EST LOW 45: CPT | Performed by: INTERNAL MEDICINE

## 2019-12-14 PROCEDURE — 87040 BLOOD CULTURE FOR BACTERIA: CPT | Performed by: PHYSICIAN ASSISTANT

## 2019-12-14 PROCEDURE — 25010000002 VANCOMYCIN 10 G RECONSTITUTED SOLUTION: Performed by: HOSPITALIST

## 2019-12-14 PROCEDURE — 25010000002 METHYLPREDNISOLONE PER 40 MG: Performed by: INTERNAL MEDICINE

## 2019-12-14 RX ORDER — METHYLPREDNISOLONE SODIUM SUCCINATE 40 MG/ML
40 INJECTION, POWDER, LYOPHILIZED, FOR SOLUTION INTRAMUSCULAR; INTRAVENOUS EVERY 12 HOURS
Status: DISCONTINUED | OUTPATIENT
Start: 2019-12-14 | End: 2019-12-20

## 2019-12-14 RX ORDER — MAGNESIUM SULFATE HEPTAHYDRATE 40 MG/ML
4 INJECTION, SOLUTION INTRAVENOUS AS NEEDED
Status: DISCONTINUED | OUTPATIENT
Start: 2019-12-14 | End: 2019-12-22 | Stop reason: HOSPADM

## 2019-12-14 RX ORDER — PANTOPRAZOLE SODIUM 40 MG/1
40 TABLET, DELAYED RELEASE ORAL
Status: DISCONTINUED | OUTPATIENT
Start: 2019-12-14 | End: 2019-12-22 | Stop reason: HOSPADM

## 2019-12-14 RX ORDER — MAGNESIUM SULFATE HEPTAHYDRATE 40 MG/ML
2 INJECTION, SOLUTION INTRAVENOUS AS NEEDED
Status: DISCONTINUED | OUTPATIENT
Start: 2019-12-14 | End: 2019-12-22 | Stop reason: HOSPADM

## 2019-12-14 RX ORDER — POTASSIUM CHLORIDE 750 MG/1
40 CAPSULE, EXTENDED RELEASE ORAL AS NEEDED
Status: DISCONTINUED | OUTPATIENT
Start: 2019-12-14 | End: 2019-12-22 | Stop reason: HOSPADM

## 2019-12-14 RX ORDER — POTASSIUM CHLORIDE 7.45 MG/ML
10 INJECTION INTRAVENOUS
Status: DISCONTINUED | OUTPATIENT
Start: 2019-12-14 | End: 2019-12-22 | Stop reason: HOSPADM

## 2019-12-14 RX ORDER — POTASSIUM CHLORIDE 1.5 G/1.77G
40 POWDER, FOR SOLUTION ORAL AS NEEDED
Status: DISCONTINUED | OUTPATIENT
Start: 2019-12-14 | End: 2019-12-22 | Stop reason: HOSPADM

## 2019-12-14 RX ADMIN — SULFASALAZINE 1000 MG: 500 TABLET ORAL at 14:49

## 2019-12-14 RX ADMIN — VANCOMYCIN HYDROCHLORIDE 1250 MG: 10 INJECTION, POWDER, LYOPHILIZED, FOR SOLUTION INTRAVENOUS at 23:40

## 2019-12-14 RX ADMIN — MAGNESIUM SULFATE HEPTAHYDRATE 4 G: 40 INJECTION, SOLUTION INTRAVENOUS at 14:49

## 2019-12-14 RX ADMIN — SULFASALAZINE 1000 MG: 500 TABLET ORAL at 20:19

## 2019-12-14 RX ADMIN — FERROUS SULFATE TAB 325 MG (65 MG ELEMENTAL FE) 650 MG: 325 (65 FE) TAB at 20:19

## 2019-12-14 RX ADMIN — PANTOPRAZOLE SODIUM 40 MG: 40 TABLET, DELAYED RELEASE ORAL at 17:39

## 2019-12-14 RX ADMIN — METRONIDAZOLE 500 MG: 500 INJECTION, SOLUTION INTRAVENOUS at 00:46

## 2019-12-14 RX ADMIN — SODIUM CHLORIDE, PRESERVATIVE FREE 10 ML: 5 INJECTION INTRAVENOUS at 08:59

## 2019-12-14 RX ADMIN — METRONIDAZOLE 500 MG: 500 INJECTION, SOLUTION INTRAVENOUS at 08:53

## 2019-12-14 RX ADMIN — METHYLPREDNISOLONE SODIUM SUCCINATE 125 MG: 125 INJECTION, POWDER, FOR SOLUTION INTRAMUSCULAR; INTRAVENOUS at 05:10

## 2019-12-14 RX ADMIN — METHYLPREDNISOLONE SODIUM SUCCINATE 40 MG: 40 INJECTION, POWDER, LYOPHILIZED, FOR SOLUTION INTRAMUSCULAR; INTRAVENOUS at 17:39

## 2019-12-14 NOTE — PROGRESS NOTES
Malnutrition Severity Assessment    Patient Name:  Rafa Dan  YOB: 1970  MRN: [id number]  Admit Date:  12/13/2019    Patient meets criteria for : Severe Malnutrition    Comments:  MD, PT MEETS CRITERIA FOR SEVERE, CHRONIC MALNUTRITION. PLEASE ATTEST & INCLUDE IN DX AS APPROPRIATE.     Malnutrition Severity Assessment  Malnutrition Type: Chronic Disease - Related Malnutrition     Malnutrition Type (last 8 hours)      Malnutrition Severity Assessment     Row Name 12/14/19 1447       Malnutrition Severity Assessment    Malnutrition Type  Chronic Disease - Related Malnutrition    Row Name 12/14/19 1447       Insufficient Energy Intake     Insufficient Energy Intake   <75% of est. energy requirement for > or equal to 1 month    Row Name 12/14/19 1447       Unintentional Weight Loss     Unintentional Weight Loss   Weight loss greater than 10% in six months    Row Name 12/14/19 1447       Criteria Met (Must meet criteria for severity in at least 2 of these categories: M Wasting, Fat Loss, Fluid, Secondary Signs, Wt. Status, Intake)    Patient meets criteria for   Severe Malnutrition          Electronically signed by:  Iram Manuel RD  12/14/19 3:02 PM

## 2019-12-14 NOTE — PROGRESS NOTES
"Adult Nutrition  Assessment/PES    Patient Name:  Rafa Dan  YOB: 1970  MRN: [id number]  Admit Date:  12/13/2019    Assessment Date:  12/14/2019    Comments:      Reason for Assessment     Row Name 12/14/19 1448          Reason for Assessment    Reason For Assessment  identified at risk by screening criteria     Diagnosis  -- INTRACTABLE DIARRHEA, HYPONATREMIA, FE++ DEF ANEMIA, S/P BLOOD TRANSFUSION X 2, THROMBOCYTOSIS. PMH: UC, FE++ DEF ANEMIA, UI WT LOSS, INTRACTABLE DIARRHEA, S/P TOB ABUSE, THC     Identified At Risk by Screening Criteria  MST SCORE 2+;unintentional loss of 10 lbs or more in the past 2 mos         Nutrition/Diet History     Row Name 12/14/19 1449          Nutrition/Diet History    Typical Food/Fluid Intake  PT REPORTS LAST STANDING WEIGHT # @ MD OFFICE ~ 1.5 WEEKS AGO. HE REPORTS UBW ~ 155# IN WHICH HE LAST WEIGHED JULY OF THIS YEAR.  PT REPORTS HIS APPETITE IS OKAY BUT FOOD TASTES \"OFF\" & HE KNOWS IF HE EATS HE WILL HAVE DIARRHEA WHICH HE BELIEVES IS INHIBITING HIS WILLINGNESS TO EAT AS MUCH. HE DENIES FOOD DISLIKES OR INTOLERANCES O/T PORK &  EVEN HIS FAVORITE FOODS TASTES \"OFF\". HE REPORTS WILLINGNESS TO DRINK VANILLA BOOST PLUS TID & HAS HAD IN THE PAST.          Anthropometrics     Row Name 12/14/19 1451          Anthropometrics    Height  182.9 cm (72\")     Current Weight Method  measured BEDSCALE     Weight  52 kg (114 lb 9.6 oz)        Ideal Body Weight (IBW)    Ideal Body Weight (IBW) (kg)  82.07     % Ideal Body Weight  63.34        Usual Body Weight (UBW)    Usual Body Weight  70.3 kg (155 lb) PER PT REPORT IN WHICH HE LAST WEIGHED IN JULY OF THIS YEAR. PER [facility] MD OFFICE RECORDS, WT 11/25/19 121.8#, 10/21/19 144#. 10/16/19 161.6# & 10/6/19 121#. A STANDING SCALE WEIGHT # 10/6/19     % Usual Body Weight  73.94     Weight Loss  unintentional 45# (29% OF BODY WT) BASED ON PT REPORTED UBW & LAST STANDING WEIGHT 1.5 WKS AGO.     Weight Loss Time " Frame  ~ 5 MONTHS        Body Mass Index (BMI)    BMI (kg/m2)  15.58         Labs/Tests/Procedures/Meds     Row Name 12/14/19 1456          Labs/Procedures/Meds    Lab Results Reviewed  reviewed     Lab Results Comments  MG++        Medications    Pertinent Medications Comments  NOTED REPLACING MG++         Physical Findings     Row Name 12/14/19 1457          Physical Findings    Gastrointestinal  --     Skin  other (see comments) WDL PER NURSING DOCUMENTATION                 Nutrition Prescription Ordered     Row Name 12/14/19 1457          Nutrition Prescription PO    Current PO Diet  Regular     Other Modifiers  Low Fiber;Low Residue         Evaluation of Received Nutrient/Fluid Intake     Row Name 12/14/19 1458          PO Evaluation    Number of Days PO Intake Evaluated  Insufficient Data NO MEALS RECORDED YET           Malnutrition Severity Assessment     Row Name 12/14/19 1447          Malnutrition Severity Assessment    Malnutrition Type  Chronic Disease - Related Malnutrition        Insufficient Energy Intake     Insufficient Energy Intake   <75% of est. energy requirement for > or equal to 1 month        Unintentional Weight Loss     Unintentional Weight Loss   Weight loss greater than 10% in six months        Criteria Met (Must meet criteria for severity in at least 2 of these categories: M Wasting, Fat Loss, Fluid, Secondary Signs, Wt. Status, Intake)    Patient meets criteria for   Severe Malnutrition           Problem/Interventions:  Problem 1     Row Name 12/14/19 1453          Nutrition Diagnoses Problem 1    Problem 1  Malnutrition SEVERE, CHRONIC     Etiology (related to)  Functional Diagnosis;Medical Diagnosis     Gastrointestinal  Ulcerative colitis;Diarrhea     Signs/Symptoms (evidenced by)  Report of Mnimal PO Intake;Unintended Weight Change < 75% OF INTAKE & APPETITE FOR > 1 MONTH     Unintended Weight Change  Loss     Number of Pounds Lost  45# (29% OF BODY WT)     Weight loss time period  ~  5 MONTHS               Intervention Goal     Row Name 12/14/19 1500          Intervention Goal    General  Nutrition support treatment     PO  Establish PO;Tolerate PO;Increase intake         Nutrition Intervention     Row Name 12/14/19 1500          Nutrition Intervention    RD/Tech Action  Advise alternate selection;Advise available snack;Interview for preference;Menu adjusted;Encourage intake;Recommend/ordered;Follow Tx progress;Care plan reviewd     Recommended/Ordered  Supplement         Nutrition Prescription     Row Name 12/14/19 1500          Nutrition Prescription PO    PO Prescription  Begin/change supplement     Supplement  Boost Plus     Supplement Frequency  3 times a day     New PO Prescription Ordered?  Yes         Education/Evaluation     Row Name 12/14/19 1500          Monitor/Evaluation    Monitor  Per protocol;I&O;PO intake;Supplement intake;Pertinent labs;Weight;Skin status;Symptoms           Electronically signed by:  Iram Manuel RD  12/14/19 3:01 PM

## 2019-12-14 NOTE — CONSULTS
[facility] Consult    Referring Provider: Sugar Guerrero MD    PCP: Diego García APRN    Reason for Consultation: Flare of ulcerative colitis    Chief complaint: Bloody diarrhea    History of present illness:    Rafa Dan is a 49 y.o. male who is admitted with several week history of diarrhea.  There is associated weight loss and blood in the stool.  Patient was diagnosed with severe pan-ulcerative colitis in August.  His symptoms improved on prednisone and sulfasalazine.  Corticosteroids were discontinued after 10 days, due to lower extremity edema.  Sulfasalazine was continued, but there was a lapse in sulfasalazine therapy of at least 2 weeks last month (no refills on prescription).  Patient resumed sulfasalazine at return visit with DANA Paula on 11/25/2019.    Allergies:  Patient has no known allergies.    Scheduled Meds:    ferrous sulfate 650 mg Oral BID   levoFLOXacin 750 mg Intravenous Q24H   methylPREDNISolone sodium succinate 125 mg Intravenous Q12H   metroNIDAZOLE 500 mg Intravenous Q8H   sodium chloride 10 mL Intravenous Q12H   sulfaSALAzine 1,000 mg Oral Q8H        Infusions:    sodium chloride 100 mL/hr Last Rate: Stopped (12/14/19 0526)       PRN Meds:  •  acetaminophen **OR** acetaminophen **OR** acetaminophen  •  calcium gluconate IVPB **AND** calcium gluconate IVPB **AND** Calcium, Ionized  •  HYDROcodone-acetaminophen  •  magnesium sulfate **OR** magnesium sulfate **OR** magnesium sulfate  •  ondansetron **OR** ondansetron  •  potassium chloride **OR** potassium chloride **OR** potassium chloride  •  sodium chloride  •  sodium chloride    Home Meds:  Medications Prior to Admission   Medication Sig Dispense Refill Last Dose   • ferrous sulfate 325 (65 FE) MG tablet Take 2 tablets by mouth 2 (Two) Times a Day. Take with OTC Vitamin C 500 mg 1 hour before or 2 hours after meals to enhance absorption 120 tablet 2 12/13/2019 at Unknown time   • folic acid (FOLVITE)  1 MG tablet Take 1 mg by mouth Daily.   12/13/2019 at Unknown time   • Probiotic Product (RISAQUAD-2) capsule capsule Take 1 capsule by mouth Daily.   12/13/2019 at Unknown time   • sulfaSALAzine (AZULFIDINE) 500 MG EC tablet Take 2 tablets by mouth 3 (Three) Times a Day. 540 tablet 2 12/13/2019 at Unknown time   • vitamin C (ASCORBIC ACID) 500 MG tablet Take 1 tablet by mouth 2 (Two) Times a Day. 120 tablet 2 12/13/2019 at Unknown time   • vitamin D (ERGOCALCIFEROL) 1.25 MG (11975 UT) capsule capsule Take 1 capsule by mouth 1 (One) Time Per Week for 8 doses. 8 capsule 0 Past Week at Unknown time   • Cyanocobalamin 1000 MCG sublingual tablet Place 1 tablet under the tongue Daily. 90 each 3 Unknown at Unknown time       ROS: Review of Systems   Constitutional: Positive for appetite change, fatigue and unexpected weight change. Negative for activity change, chills and fever.   HENT: Negative for mouth sores, nosebleeds and trouble swallowing.    Eyes: Negative.    Respiratory: Negative for cough, chest tightness, shortness of breath, wheezing and stridor.    Cardiovascular: Negative.  Negative for chest pain, palpitations and leg swelling.   Gastrointestinal: Positive for blood in stool and diarrhea. Negative for abdominal distention, abdominal pain, nausea and vomiting.   Endocrine: Negative.    Genitourinary: Negative.  Negative for difficulty urinating and dysuria.   Musculoskeletal: Negative.    Skin: Negative.  Negative for color change, pallor and rash.   Allergic/Immunologic: Negative.    Neurological: Negative.  Negative for tremors, seizures, syncope, weakness, light-headedness and headaches.   Hematological: Negative.  Negative for adenopathy. Does not bruise/bleed easily.   Psychiatric/Behavioral: Negative.  Negative for agitation and behavioral problems.       PAST MED HX:  Past Medical History:   Diagnosis Date   • Colitis    • Ulcerative colitis (CMS/HCC)        PAST SURG HX:  Past Surgical History:  "  Procedure Laterality Date   • COLONOSCOPY N/A 10/7/2019    Procedure: COLONOSCOPY;  Surgeon: Brunner, Mark I, MD;  Location: [facility] ENDOSCOPY;  Service: Gastroenterology       FAM HX:  Family History   Family history unknown: Yes       SOC HX:  Social History     Socioeconomic History   • Marital status: Single     Spouse name: Not on file   • Number of children: Not on file   • Years of education: Not on file   • Highest education level: Not on file   Tobacco Use   • Smoking status: Former Smoker     Packs/day: 0.00     Years: 15.00     Pack years: 0.00   • Smokeless tobacco: Never Used   • Tobacco comment: quit 4 yearse ago    Substance and Sexual Activity   • Alcohol use: No     Frequency: Never   • Drug use: Yes     Frequency: 7.0 times per week     Types: Marijuana     Comment: Stopped 4 years ago.       PHYSICAL EXAM  /62   Pulse 90   Temp 97.1 °F (36.2 °C) (Axillary)   Resp 14   Ht 182.9 cm (72\")   Wt 52 kg (114 lb 9.6 oz)   SpO2 91%   BMI 15.54 kg/m²   Wt Readings from Last 3 Encounters:   12/14/19 52 kg (114 lb 9.6 oz)   11/25/19 55.2 kg (121 lb 12.8 oz)   10/21/19 65.3 kg (144 lb)   ,body mass index is 15.54 kg/m².  Physical Exam   Constitutional: He is oriented to person, place, and time. He appears well-developed. No distress.   Appears chronically ill and underweight.   HENT:   Head: Normocephalic.   Mouth/Throat: No oropharyngeal exudate.   Eyes: Conjunctivae are normal. No scleral icterus.   Neck: Normal range of motion.   Cardiovascular: Normal rate and regular rhythm.   Pulmonary/Chest: Effort normal and breath sounds normal. No stridor. No respiratory distress. He has no wheezes. He has no rales.   Abdominal: Soft. Bowel sounds are normal. He exhibits no distension. There is no tenderness. There is no guarding.   Genitourinary:   Genitourinary Comments: Deferred   Musculoskeletal: Normal range of motion. He exhibits no edema.   Neurological: He is alert and oriented to person, place, " and time.   Skin: Skin is warm and dry. Capillary refill takes less than 2 seconds. No rash noted.   Psychiatric: He has a normal mood and affect. His behavior is normal.   Nursing note and vitals reviewed.      Results Review:   I reviewed the patient's new clinical results.    Lab Results   Component Value Date    WBC 4.99 12/14/2019    HGB 7.3 (L) 12/14/2019    HGB 7.4 (L) 12/13/2019    HGB 7.1 (L) 12/13/2019    HCT 24.6 (L) 12/14/2019    MCV 81.5 12/14/2019     12/14/2019       Lab Results   Component Value Date    INR 1.28 (H) 12/13/2019       Lab Results   Component Value Date    GLUCOSE 140 (H) 12/14/2019    BUN 11 12/14/2019    CREATININE 0.84 12/14/2019    EGFRIFNONA 96 12/14/2019    BCR 13.1 12/14/2019     (L) 12/14/2019    K 4.2 12/14/2019    CO2 21.0 (L) 12/14/2019    CALCIUM 6.9 (L) 12/14/2019    ALBUMIN 1.60 (L) 12/13/2019    ALKPHOS 152 (H) 12/13/2019    BILITOT 0.4 12/13/2019    ALT 23 12/13/2019    AST 35 12/13/2019     C diff and stool PCR panel negative.    ASSESSMENTS/PLANS    Severe pan-ulcerative colitis with hemorrhage.  Chronic blood loss anemia.  Non-volitional weight loss.  Severe protein calorie malnutrition.    >> Continue corticosteroids.  >> Continue sulfasalazine.  >> Will need biologic therapy and indigent coverage for such. Plan Humira.   >> Stop antibiotics    I discussed the patients findings and my recommendations with patient.    Mark I. Brunner, MD  12/14/19  3:05 PM

## 2019-12-14 NOTE — PROGRESS NOTES
[facility]  PROGRESS NOTE    Patient Name: Rafa Dan  : 1970  MRN: [id number]    Date of Admission: 2019  Primary Care Physician: Diego García APRN    Subjective   Subjective     CC:  F/U diarrhea    HPI:  Patient seen this morning. Says he is hungry, hasn't eaten since Thursday. Denies abdominal pain.    Review of Systems  Gen-no fevers, no chills  CV-no chest pain, no palpitations  Resp-no cough, no dyspnea  GI-no N/V/D, no abd pain    All other systems reviewed and negative except any additional pertinent positives and negatives as discussed in HPI.    Objective   Objective     Vital Signs:   Temp:  [97.3 °F (36.3 °C)-98.3 °F (36.8 °C)] 97.8 °F (36.6 °C)  Heart Rate:  [] 86  Resp:  [15-18] 18  BP: ()/(52-76) 100/76        Physical Exam:  Gen-no acute distress, cachectic   HENT-NCAT, mucous membranes moist  CV-RRR, S1 S2 normal, no m/r/g  Resp-CTAB, no wheezes or rales  Abd-soft, NT, ND, +BS  Ext-no edema  Neuro-A&Ox3, no focal deficits  Skin-no rashes  Psych-appropriate mood    Results Reviewed:    Results from last 7 days   Lab Units 19  02419  17219  17019  1315   WBC 10*3/mm3 4.99  --   --  7.61   HEMOGLOBIN g/dL 7.3* 7.4*  --  8.2*   HEMOGLOBIN, POC g/dL  --   --  7.1*  --    HEMATOCRIT % 24.6* 26.1*  --  28.6*   HEMATOCRIT POC %  --   --  21*  --    PLATELETS 10*3/mm3 402  --   --  547*   INR   --   --   --  1.28*   PROCALCITONIN ng/mL  --   --   --  0.54*     Results from last 7 days   Lab Units 19  0240 19  17019  1315   SODIUM mmol/L 131*  --  130*   POTASSIUM mmol/L 4.2  --  4.2   CHLORIDE mmol/L 102  --  95*   CO2 mmol/L 21.0*  --  19.0*   BUN mg/dL 11  --  13   CREATININE mg/dL 0.84 1.00 1.09   GLUCOSE mg/dL 140*  --  117*   CALCIUM mg/dL 6.9*  --  7.9*   ALT (SGPT) U/L  --   --  23   AST (SGOT) U/L  --   --  35     Estimated Creatinine Clearance: 76.5 mL/min (by  C-G formula based on SCr of 0.84 mg/dL).    Microbiology Results Abnormal     Procedure Component Value - Date/Time    Gastrointestinal Panel, PCR - Stool, Per Rectum [[id number]]  (Normal) Collected:  12/13/19 1545    Lab Status:  Final result Specimen:  Stool from Per Rectum Updated:  12/14/19 0044     Campylobacter Not Detected     Plesiomonas shigelloides Not Detected     Salmonella Not Detected     Vibrio Not Detected     Vibrio cholerae Not Detected     Yersinia enterocolitica Not Detected     Enteroaggregative E. coli (EAEC) Not Detected     Enteropathogenic E. coli (EPEC) Not Detected     Enterotoxigenic E. coli (ETEC) lt/st Not Detected     Shiga-like toxin-producing E. coli (STEC) stx1/stx2 Not Detected     E. coli O157 Not Detected     Shigella/Enteroinvasive E. coli (EIEC) Not Detected     Cryptosporidium Not Detected     Cyclospora cayetanensis Not Detected     Entamoeba histolytica Not Detected     Giardia lamblia Not Detected     Adenovirus F40/41 Not Detected     Astrovirus Not Detected     Norovirus GI/GII Not Detected     Rotavirus A Not Detected     Sapovirus (I, II, IV or V) Not Detected    Narrative:       If Aeromonas, Staphylococcus aureus or Bacillus cereus are suspected, please order [id number]: Stool Culture, Aeromonas, S aureus, B Cereus.    Clostridium Difficile Toxin - Stool, Per Rectum [[id number]] Collected:  12/13/19 1545    Lab Status:  Final result Specimen:  Stool from Per Rectum Updated:  12/13/19 1732    Narrative:       The following orders were created for panel order Clostridium Difficile Toxin - Stool, Per Rectum.  Procedure                               Abnormality         Status                     ---------                               -----------         ------                     Clostridium Difficile To...[[id number]]  Normal              Final result                 Please view results for these tests on the individual orders.    Clostridium Difficile Toxin, PCR - Stool,  Per Rectum [[id number]]  (Normal) Collected:  12/13/19 1545    Lab Status:  Final result Specimen:  Stool from Per Rectum Updated:  12/13/19 1731     C. Difficile Toxins by PCR Not Detected    Narrative:       Performance characteristics of test not established for patients <2 years of age.  Negative for Toxigenic C. Difficile    Fecal Leukocytes - Stool, Per Rectum [[id number]]  (Abnormal) Collected:  12/13/19 1545    Lab Status:  Final result Specimen:  Stool from Per Rectum Updated:  12/13/19 1717     Fecal Leukocytes Many (4+) WBC's Seen          Imaging Results (Last 24 Hours)     Procedure Component Value Units Date/Time    CT Abdomen Pelvis With Contrast [[id number]] Collected:  12/13/19 2118     Updated:  12/13/19 2120    Narrative:       CT Abdomen Pelvis W    INDICATION:   49-year-old male with abdominal pain and nausea and vomiting for 2 to 3 weeks. History of ulcerative colitis and sepsis.    TECHNIQUE:   CT of the abdomen and pelvis with IV contrast. Coronal and sagittal reconstructions were obtained.  Radiation dose reduction techniques included automated exposure control or exposure modulation based on body size. Count of known CT and cardiac nuc med  studies performed in previous 12 months: 1.     COMPARISON:   CT abdomen pelvis 10/15/2019    FINDINGS:  Visualized lung bases are unremarkable.    Abdomen:   Severe diffuse fatty infiltration throughout the liver. The spleen and pancreas are normal. The gallbladder is normal. Both adrenal glands are normal. Small right renal cysts in the upper pole. The kidneys are otherwise unremarkable. Abdominal aorta  normal in course and caliber with extensive atherosclerotic calcification, but no dissection. Small bowel is unremarkable without obstruction. The appendix is again noted to be borderline in size, measuring up to 9 mm in maximum diameter. Slight interval  decrease in pancolonic wall thickening and mild pericolonic inflammatory stranding. No free fluid or  free air.    Pelvis:   The urinary bladder is unremarkable.  The prostate gland is unremarkable. No free pelvic fluid.    No acute osseous abnormality.        Impression:         1. Slight interval decrease in pancolonic circumferential wall thickening and mild pericolonic inflammatory stranding. Findings again suggest inflammatory colitis, consistent with provided clinical history of ulcerative colitis. Infectious or ischemic  colitis is considered less likely.  2. Appendix again noted to be borderline in size, measuring 9 mm in maximum diameter. This is not significantly changed from the comparison study from 10/5/2019. This again may represent reactive inflammation or enlargement of the appendix, but  correlation should be made clinically to exclude acute appendicitis.  3. Severe hepatic steatosis.          Signer Name: David Jones MD   Signed: 12/13/2019 9:18 PM   [id number]    [facility]    XR Chest 1 View [[id number]] Collected:  12/13/19 1311     Updated:  12/13/19 1636    Narrative:       EXAMINATION: XR CHEST 1 VW- 12/13/2019     INDICATION: Severe Sepsis triage protocol      COMPARISON: NONE     FINDINGS: Single portable chest radiograph was submitted for review.    Two acquisitions imaging the entire chest.      The heart is not enlarged. Chronic changes of the lungs are identified  without evidence of active airspace disease. Visualized upper abdomen is  unrevealing.  No acute osseous abnormality identified.        Impression:       1. Chronic appearing emphysematous changes of the lungs without evidence  for acute or active air space disease.     2. Bullous changes in the right lung apex are suggested.     D:  12/13/2019  E:  12/13/2019     This report was finalized on 12/13/2019 4:33 PM by Dr. Hilario Abreu MD.                  I have reviewed the medications:  Scheduled Meds:  ferrous sulfate 650 mg Oral BID   levoFLOXacin 750 mg Intravenous Q24H    methylPREDNISolone sodium succinate 125 mg Intravenous Q12H   metroNIDAZOLE 500 mg Intravenous Q8H   sodium chloride 10 mL Intravenous Q12H   sulfaSALAzine 1,000 mg Oral Q8H     Continuous Infusions:  sodium chloride 100 mL/hr Last Rate: Stopped (12/14/19 0526)     PRN Meds:.•  acetaminophen **OR** acetaminophen **OR** acetaminophen  •  HYDROcodone-acetaminophen  •  ondansetron **OR** ondansetron  •  sodium chloride  •  sodium chloride      Assessment/Plan   Assessment / Plan     Active Hospital Problems    Diagnosis  POA   • Hyponatremia [E87.1]  Unknown   • Lactic acidosis [E87.2]  Unknown   • Intractable diarrhea [R19.7]  Yes   • Unintentional weight loss [R63.4]  Yes   • Thrombocytosis (CMS/HCC) [D47.3]  Yes   • Iron deficiency anemia [D50.9]  Yes   • Ulcerative colitis (CMS/HCC) [K51.90]  Unknown      Resolved Hospital Problems   No resolved problems to display.        Brief Hospital Course to date:  Rafa Dan is a 49 y.o. male with PMH of prior tobacco abuse and relatively recent diagnosis of ulcerative colitis (August 2019) who presents with intractable diarrhea and unintentional weight loss concerning for UC flare.     Intractable diarrhea  Ulcerative colitis flare   GI bleed  Acute blood loss anemia  --Continue IV Solumedrol.  --Continue Levaquin/Flagyl for now. C.diff and GI PCR panel are negative.   --Continue Sulfasalazine per home regimen.   --GI consulted.  --Did not respond to 1 unit PRBC yesterday, 2 additional units ordered for today. Monitor serial H&H. Keep NPO until seen by GI.     Lactic Acidosis  --Resolved with IV fluids. Continue.     Hyponatremia  --Likely hypovolemic hyponatremia.  --Still low today. Continue fluids.    Hx of Iron Deficiency Anemia  --Continue oral iron replacement.     Thrombocytosis  --Likely reactive due to above  --Improved.    DVT Prophylaxis:  mechanical    Disposition: I expect the patient to be discharged home TBD    CODE STATUS:   Code Status and  Medical Interventions:   Ordered at: 12/13/19 1521     Level Of Support Discussed With:    Patient     Code Status:    CPR     Medical Interventions (Level of Support Prior to Arrest):    Full         Electronically signed by Trina Guerrero MD, 12/14/19, 10:38 AM.

## 2019-12-14 NOTE — SIGNIFICANT NOTE
.Called with low bp by nursing staff.  Patient with no evidence of bleeding at that time, however started transfusion due to concern for possible bleeding.  Despite 1 unit prbcs, patient has had reduction in hb from 7.4 to 7.3. Lactic acid 1.1 making ischemia less likely. Advised nursing staff to transfuse 2 more units prbcs. Patient noted to have large melanotic stool around 0410. CT obtained and consistent with pancolitis likely secondary to UC. Notify GI this am.

## 2019-12-15 PROBLEM — E43 SEVERE MALNUTRITION (HCC): Status: ACTIVE | Noted: 2019-12-15

## 2019-12-15 LAB
ABO + RH BLD: NORMAL
ANION GAP SERPL CALCULATED.3IONS-SCNC: 6 MMOL/L (ref 5–15)
BACTERIA BLD CULT: ABNORMAL
BACTERIA SPEC AEROBE CULT: ABNORMAL
BH BB BLOOD EXPIRATION DATE: NORMAL
BH BB BLOOD TYPE BARCODE: 6200
BH BB DISPENSE STATUS: NORMAL
BH BB PRODUCT CODE: NORMAL
BH BB UNIT NUMBER: NORMAL
BUN BLD-MCNC: 12 MG/DL (ref 6–20)
BUN/CREAT SERPL: 14.3 (ref 7–25)
CA-I SERPL ISE-MCNC: 1.1 MMOL/L (ref 1.12–1.32)
CALCIUM SPEC-SCNC: 6.8 MG/DL (ref 8.6–10.5)
CHLORIDE SERPL-SCNC: 105 MMOL/L (ref 98–107)
CO2 SERPL-SCNC: 20 MMOL/L (ref 22–29)
CREAT BLD-MCNC: 0.84 MG/DL (ref 0.76–1.27)
DEPRECATED RDW RBC AUTO: 50.7 FL (ref 37–54)
ERYTHROCYTE [DISTWIDTH] IN BLOOD BY AUTOMATED COUNT: 17.2 % (ref 12.3–15.4)
GFR SERPL CREATININE-BSD FRML MDRD: 96 ML/MIN/1.73
GLUCOSE BLD-MCNC: 127 MG/DL (ref 65–99)
GRAM STN SPEC: ABNORMAL
HCT VFR BLD AUTO: 30.2 % (ref 37.5–51)
HCT VFR BLD AUTO: 30.8 % (ref 37.5–51)
HCT VFR BLD AUTO: 31 % (ref 37.5–51)
HGB BLD-MCNC: 9.3 G/DL (ref 13–17.7)
HGB BLD-MCNC: 9.5 G/DL (ref 13–17.7)
HGB BLD-MCNC: 9.9 G/DL (ref 13–17.7)
ISOLATED FROM: ABNORMAL
MAGNESIUM SERPL-MCNC: 2.4 MG/DL (ref 1.6–2.6)
MCH RBC QN AUTO: 25.1 PG (ref 26.6–33)
MCHC RBC AUTO-ENTMCNC: 30.8 G/DL (ref 31.5–35.7)
MCV RBC AUTO: 81.6 FL (ref 79–97)
PLATELET # BLD AUTO: 265 10*3/MM3 (ref 140–450)
PMV BLD AUTO: 8.8 FL (ref 6–12)
POTASSIUM BLD-SCNC: 4.7 MMOL/L (ref 3.5–5.2)
RBC # BLD AUTO: 3.7 10*6/MM3 (ref 4.14–5.8)
SODIUM BLD-SCNC: 131 MMOL/L (ref 136–145)
UNIT  ABO: NORMAL
UNIT  RH: NORMAL
WBC NRBC COR # BLD: 8.39 10*3/MM3 (ref 3.4–10.8)

## 2019-12-15 PROCEDURE — 25010000002 VANCOMYCIN PER 500 MG: Performed by: HOSPITALIST

## 2019-12-15 PROCEDURE — 83735 ASSAY OF MAGNESIUM: CPT | Performed by: HOSPITALIST

## 2019-12-15 PROCEDURE — 80048 BASIC METABOLIC PNL TOTAL CA: CPT | Performed by: HOSPITALIST

## 2019-12-15 PROCEDURE — 97530 THERAPEUTIC ACTIVITIES: CPT

## 2019-12-15 PROCEDURE — 85027 COMPLETE CBC AUTOMATED: CPT | Performed by: HOSPITALIST

## 2019-12-15 PROCEDURE — 85014 HEMATOCRIT: CPT | Performed by: HOSPITALIST

## 2019-12-15 PROCEDURE — 82330 ASSAY OF CALCIUM: CPT | Performed by: HOSPITALIST

## 2019-12-15 PROCEDURE — 97116 GAIT TRAINING THERAPY: CPT

## 2019-12-15 PROCEDURE — 99233 SBSQ HOSP IP/OBS HIGH 50: CPT | Performed by: HOSPITALIST

## 2019-12-15 PROCEDURE — 85018 HEMOGLOBIN: CPT | Performed by: HOSPITALIST

## 2019-12-15 PROCEDURE — 25010000002 METHYLPREDNISOLONE PER 40 MG: Performed by: INTERNAL MEDICINE

## 2019-12-15 PROCEDURE — 99232 SBSQ HOSP IP/OBS MODERATE 35: CPT | Performed by: INTERNAL MEDICINE

## 2019-12-15 PROCEDURE — 97161 PT EVAL LOW COMPLEX 20 MIN: CPT

## 2019-12-15 RX ORDER — FOLIC ACID 1 MG/1
1 TABLET ORAL DAILY
Status: DISCONTINUED | OUTPATIENT
Start: 2019-12-15 | End: 2019-12-22 | Stop reason: HOSPADM

## 2019-12-15 RX ADMIN — SODIUM CHLORIDE, PRESERVATIVE FREE 10 ML: 5 INJECTION INTRAVENOUS at 21:25

## 2019-12-15 RX ADMIN — SULFASALAZINE 1000 MG: 500 TABLET ORAL at 13:03

## 2019-12-15 RX ADMIN — FERROUS SULFATE TAB 325 MG (65 MG ELEMENTAL FE) 650 MG: 325 (65 FE) TAB at 21:24

## 2019-12-15 RX ADMIN — SULFASALAZINE 1000 MG: 500 TABLET ORAL at 21:24

## 2019-12-15 RX ADMIN — VANCOMYCIN HYDROCHLORIDE 750 MG: 750 INJECTION, SOLUTION INTRAVENOUS at 13:03

## 2019-12-15 RX ADMIN — FERROUS SULFATE TAB 325 MG (65 MG ELEMENTAL FE) 650 MG: 325 (65 FE) TAB at 08:53

## 2019-12-15 RX ADMIN — PANTOPRAZOLE SODIUM 40 MG: 40 TABLET, DELAYED RELEASE ORAL at 05:28

## 2019-12-15 RX ADMIN — METHYLPREDNISOLONE SODIUM SUCCINATE 40 MG: 40 INJECTION, POWDER, LYOPHILIZED, FOR SOLUTION INTRAMUSCULAR; INTRAVENOUS at 18:24

## 2019-12-15 RX ADMIN — SULFASALAZINE 1000 MG: 500 TABLET ORAL at 05:28

## 2019-12-15 RX ADMIN — FOLIC ACID 1 MG: 1 TABLET ORAL at 13:03

## 2019-12-15 RX ADMIN — METHYLPREDNISOLONE SODIUM SUCCINATE 40 MG: 40 INJECTION, POWDER, LYOPHILIZED, FOR SOLUTION INTRAMUSCULAR; INTRAVENOUS at 05:28

## 2019-12-15 NOTE — PROGRESS NOTES
"GI Daily Progress Note  Subjective:    Chief Complaint:  F/u ulcerative colitis    Diarrhea persists, but more consistency. No visible blood in stool. Appetite good.    Objective:    /72 (BP Location: Left arm, Patient Position: Lying)   Pulse 82   Temp 97.7 °F (36.5 °C) (Oral)   Resp 18   Ht 182.9 cm (72\")   Wt 52 kg (114 lb 9.6 oz)   SpO2 91%   BMI 15.54 kg/m²     Physical Exam   Constitutional: He is oriented to person, place, and time. He appears well-developed. No distress.   Appears chronically ill   HENT:   Head: Normocephalic.   Mouth/Throat: No oropharyngeal exudate.   Eyes: Conjunctivae are normal. No scleral icterus.   Neck: Normal range of motion.   Cardiovascular: Normal rate and regular rhythm.   Pulmonary/Chest: Effort normal and breath sounds normal. No stridor. No respiratory distress. He has no wheezes. He has no rales.   Abdominal: Soft. Bowel sounds are normal. He exhibits no distension and no mass. There is no tenderness. There is no guarding.   Genitourinary:   Genitourinary Comments: deferred   Musculoskeletal: He exhibits no edema.   Sarcopenia   Neurological: He is alert and oriented to person, place, and time.   Skin: Skin is warm and dry. Capillary refill takes less than 2 seconds. No rash noted. No pallor.   Psychiatric: He has a normal mood and affect. His behavior is normal.   Nursing note and vitals reviewed.      Lab  Lab Results   Component Value Date    WBC 8.39 12/15/2019    HGB 9.3 (L) 12/15/2019    HGB 9.9 (L) 12/14/2019    HGB 11.1 (L) 12/14/2019    MCV 81.6 12/15/2019     12/15/2019    INR 1.28 (H) 12/13/2019       Lab Results   Component Value Date    GLUCOSE 127 (H) 12/15/2019    BUN 12 12/15/2019    CREATININE 0.84 12/15/2019    EGFRIFNONA 96 12/15/2019    BCR 14.3 12/15/2019     (L) 12/15/2019    K 4.7 12/15/2019    CO2 20.0 (L) 12/15/2019    CALCIUM 6.8 (L) 12/15/2019    ALBUMIN 1.60 (L) 12/13/2019    ALKPHOS 152 (H) 12/13/2019    BILITOT 0.4 " 12/13/2019    ALT 23 12/13/2019    AST 35 12/13/2019       Assessment:    Severe pan-ulcerative colitis with hemorrhage.  Chronic blood loss anemia.  Non-volitional weight loss.  Severe protein calorie malnutrition.    Plan:    Continue corticosteroids  Continue sulfasalazine.  Needs 1 mg folic acid daily while on sulfasalazine.  Plan Humira as outpatient.    Mark I. Brunner, MD  12/15/19  11:51 AM

## 2019-12-15 NOTE — SIGNIFICANT NOTE
RN called to report positive aerobic blood culture with detection of staphylococcus aureus with methicillin resistant gene. Will give the patient 1 dose of Vancomycin tonight and defer further antibiotics to daytime team. Would consider ID consult. Will repeat blood cultures.

## 2019-12-15 NOTE — PLAN OF CARE
Problem: Patient Care Overview  Goal: Plan of Care Review  Flowsheets (Taken 12/15/2019 7445)  Progress: improving  Plan of Care Reviewed With: patient  Outcome Summary: Presents w/ stable clinical status (?MRSA, C Diff r/o'd), AF/RVR, B LE weakness, decr. endurance, & impaired gt.in [place] & on stairs; able to amb 30 ft in room w/supv (HR to 133) & perform LE ther exer per issued HEP (indep in sit/stand bal. activ), but decl. gt in [place] & on stairs;c/o butt.pain & (F) diarrhea;noted low HGB (7.3); Needs stair trng,gt trial in [place], & to review HEP again; anticipate d/c home w/ asst from friends; will not require skilled PT services s/p d/c

## 2019-12-15 NOTE — PROGRESS NOTES
[facility] Medicine Services  PROGRESS NOTE    Patient Name: Rafa Dan  : 1970  MRN: [id number]    Date of Admission: 2019  Primary Care Physician: Diego García APRN    Subjective   Subjective     CC:  F/U diarrhea    HPI:  Patient seen this morning. No complaints. Eating well. Diarrhea slowing down, maybe had 10-12 bowel movements past 24 hours. Stools are still dark.    Review of Systems  Gen-no fevers, no chills  CV-no chest pain, no palpitations  Resp-no cough, no dyspnea  GI-no N/V, +diarrhea, no abd pain    All other systems reviewed and negative except any additional pertinent positives and negatives as discussed in HPI.    Objective   Objective     Vital Signs:   Temp:  [97.1 °F (36.2 °C)-98.1 °F (36.7 °C)] 97.7 °F (36.5 °C)  Heart Rate:  [78-91] 82  Resp:  [14-18] 18  BP: (105-117)/(62-73) 108/72        Physical Exam:  Gen-no acute distress, cachectic   HENT-NCAT, mucous membranes moist  CV-RRR, S1 S2 normal, no m/r/g  Resp-CTAB, no wheezes or rales  Abd-soft, NT, ND, +BS  Ext-no edema  Neuro-A&Ox3, no focal deficits  Skin-no rashes  Psych-appropriate mood    Results Reviewed:    Results from last 7 days   Lab Units 12/15/19  0745 19  2335 19  1545 19  0240  19  1315   WBC 10*3/mm3 8.39  --   --  4.99  --  7.61   HEMOGLOBIN g/dL 9.3* 9.9* 11.1* 7.3*   < > 8.2*   HEMOGLOBIN, POC   --   --   --   --    < >  --    HEMATOCRIT % 30.2* 31.0* 35.4* 24.6*   < > 28.6*   HEMATOCRIT POC   --   --   --   --    < >  --    PLATELETS 10*3/mm3 265  --   --  402  --  547*   INR   --   --   --   --   --  1.28*   PROCALCITONIN ng/mL  --   --   --   --   --  0.54*    < > = values in this interval not displayed.     Results from last 7 days   Lab Units 12/15/19  0745 19  0240 19  1704 19  1315   SODIUM mmol/L 131* 131*  --  130*   POTASSIUM mmol/L 4.7 4.2  --  4.2   CHLORIDE mmol/L 105 102  --  95*   CO2 mmol/L 20.0* 21.0*  --   19.0*   BUN mg/dL 12 11  --  13   CREATININE mg/dL 0.84 0.84 1.00 1.09   GLUCOSE mg/dL 127* 140*  --  117*   CALCIUM mg/dL 6.8* 6.9*  --  7.9*   ALT (SGPT) U/L  --   --   --  23   AST (SGOT) U/L  --   --   --  35     Estimated Creatinine Clearance: 76.5 mL/min (by C-G formula based on SCr of 0.84 mg/dL).    Microbiology Results Abnormal     Procedure Component Value - Date/Time    Blood Culture ID, PCR - Blood, Arm, Right [[id number]]  (Abnormal) Collected:  12/13/19 1310    Lab Status:  Edited Result - FINAL Specimen:  Blood from Arm, Right Updated:  12/15/19 0621     BCID, PCR Staphylococcus spp, not aureus. Identification by BCID PCR.     Comment: Corrected result. Previous result was Staphylococcus aureus. mecA (methicillin resistance gene) detected. Identification by BCID PCR. on 12/14/2019 at 2118 EST.       Blood Culture - Blood, Arm, Right [[id number]]  (Abnormal) Collected:  12/13/19 1310    Lab Status:  Preliminary result Specimen:  Blood from Arm, Right Updated:  12/14/19 2111     Blood Culture Abnormal Stain     Gram Stain Aerobic Bottle Gram positive cocci in clusters    Blood Culture - Blood, Arm, Right [[id number]] Collected:  12/13/19 1315    Lab Status:  Preliminary result Specimen:  Blood from Arm, Right Updated:  12/14/19 1345     Blood Culture No growth at 24 hours    Gastrointestinal Panel, PCR - Stool, Per Rectum [[id number]]  (Normal) Collected:  12/13/19 1545    Lab Status:  Final result Specimen:  Stool from Per Rectum Updated:  12/14/19 0044     Campylobacter Not Detected     Plesiomonas shigelloides Not Detected     Salmonella Not Detected     Vibrio Not Detected     Vibrio cholerae Not Detected     Yersinia enterocolitica Not Detected     Enteroaggregative E. coli (EAEC) Not Detected     Enteropathogenic E. coli (EPEC) Not Detected     Enterotoxigenic E. coli (ETEC) lt/st Not Detected     Shiga-like toxin-producing E. coli (STEC) stx1/stx2 Not Detected     E. coli O157 Not Detected      Shigella/Enteroinvasive E. coli (EIEC) Not Detected     Cryptosporidium Not Detected     Cyclospora cayetanensis Not Detected     Entamoeba histolytica Not Detected     Giardia lamblia Not Detected     Adenovirus F40/41 Not Detected     Astrovirus Not Detected     Norovirus GI/GII Not Detected     Rotavirus A Not Detected     Sapovirus (I, II, IV or V) Not Detected    Narrative:       If Aeromonas, Staphylococcus aureus or Bacillus cereus are suspected, please order [id number]: Stool Culture, Aeromonas, S aureus, B Cereus.    Clostridium Difficile Toxin - Stool, Per Rectum [[id number]] Collected:  12/13/19 1545    Lab Status:  Final result Specimen:  Stool from Per Rectum Updated:  12/13/19 1731    Narrative:       The following orders were created for panel order Clostridium Difficile Toxin - Stool, Per Rectum.  Procedure                               Abnormality         Status                     ---------                               -----------         ------                     Clostridium Difficile To...[[id number]]  Normal              Final result                 Please view results for these tests on the individual orders.    Clostridium Difficile Toxin, PCR - Stool, Per Rectum [[id number]]  (Normal) Collected:  12/13/19 1545    Lab Status:  Final result Specimen:  Stool from Per Rectum Updated:  12/13/19 1731     C. Difficile Toxins by PCR Not Detected    Narrative:       Performance characteristics of test not established for patients <2 years of age.  Negative for Toxigenic C. Difficile    Fecal Leukocytes - Stool, Per Rectum [[id number]]  (Abnormal) Collected:  12/13/19 1545    Lab Status:  Final result Specimen:  Stool from Per Rectum Updated:  12/13/19 1717     Fecal Leukocytes Many (4+) WBC's Seen          Imaging Results (Last 24 Hours)     ** No results found for the last 24 hours. **               I have reviewed the medications:  Scheduled Meds:    [START ON 12/16/2019] Pharmacy Consult  Does not apply  Once   ferrous sulfate 650 mg Oral BID   methylPREDNISolone sodium succinate 40 mg Intravenous Q12H   pantoprazole 40 mg Oral Q AM   sodium chloride 10 mL Intravenous Q12H   sulfaSALAzine 1,000 mg Oral Q8H   vancomycin 15 mg/kg Intravenous Q12H     Continuous Infusions:    Pharmacy to dose vancomycin      PRN Meds:.acetaminophen **OR** acetaminophen **OR** acetaminophen  •  calcium gluconate IVPB **AND** calcium gluconate IVPB **AND** Calcium, Ionized  •  magnesium sulfate **OR** magnesium sulfate **OR** magnesium sulfate  •  ondansetron **OR** ondansetron  •  Pharmacy to dose vancomycin  •  potassium chloride **OR** potassium chloride **OR** potassium chloride  •  sodium chloride  •  sodium chloride      Assessment/Plan   Assessment / Plan     Active Hospital Problems    Diagnosis  POA   • **Ulcerative colitis (CMS/HCC) [K51.90]  Unknown   • Severe chronic malnutrition (CMS/HCC) [E43]  Yes   • Hyponatremia [E87.1]  Unknown   • Lactic acidosis [E87.2]  Unknown   • Intractable diarrhea [R19.7]  Yes   • Unintentional weight loss [R63.4]  Yes   • Thrombocytosis (CMS/HCC) [D47.3]  Yes   • Iron deficiency anemia [D50.9]  Yes      Resolved Hospital Problems   No resolved problems to display.        Brief Hospital Course to date:  Rafa Dan is a 49 y.o. male with PMH of prior tobacco abuse and relatively recent diagnosis of ulcerative colitis (August 2019) who presents with intractable diarrhea and unintentional weight loss concerning for UC flare.     Intractable diarrhea  Ulcerative colitis flare   GI bleed  Acute blood loss anemia  --Continue IV Solumedrol.  --C.diff and GI PCR panel are negative. Levaquin/Flagyl discontinued.  --Continue Sulfasalazine per home regimen.   --GI following. Likely plan to start Humira.  --Did not respond to 1 unit PRBC initially so 2 additional units transfused with improvement. Monitor serial H&H.      Lactic Acidosis  --Resolved with IV fluids. Continue.    Positive blood  culture  --NOT MRSA (BCID was corrected), may turn out to be contaminant.  --Received one dose of Vanc, will hold further doses until culture results. Repeat blood cultures sent.     Hyponatremia  --Likely hypovolemic hyponatremia.  --Still low today. Continue fluids.    Hx of Iron Deficiency Anemia  --Continue oral iron replacement.     Thrombocytosis  --Likely reactive due to above  --Resolved.    Chronic severe malnutrition  --Encourage po intake.  --Nutrition following.    DVT Prophylaxis:  mechanical    Disposition: I expect the patient to be discharged home TBD    CODE STATUS:   Code Status and Medical Interventions:   Ordered at: 12/13/19 1521     Level Of Support Discussed With:    Patient     Code Status:    CPR     Medical Interventions (Level of Support Prior to Arrest):    Full         Electronically signed by Trina Guerrero MD, 12/15/19, 11:05 AM.

## 2019-12-15 NOTE — THERAPY EVALUATION
Patient Name: Rafa Dan  : 1970    MRN: [id number]                              Today's Date: 12/15/2019       Admit Date: 2019    Visit Dx:     ICD-10-CM ICD-9-CM   1. Hypotension due to hypovolemia I95.89 458.8    E86.1 276.52   2. Dehydration E86.0 276.51   3. Ulcerative colitis with rectal bleeding, unspecified location (CMS/HCC) K51.911 556.9     Patient Active Problem List   Diagnosis   • Ulcerative colitis (CMS/HCC)   • Hypoalbuminemia   • Iron deficiency anemia   • Thrombocytosis (CMS/HCC)   • Unintentional weight loss   • Intractable diarrhea   • Severe protein-calorie malnutrition (CMS/HCC)   • Weight loss   • Diarrhea   • Iron deficiency anemia due to chronic blood loss   • Hyponatremia   • Lactic acidosis   • Severe chronic malnutrition (CMS/HCC)     Past Medical History:   Diagnosis Date   • Colitis    • Ulcerative colitis (CMS/HCC)      Past Surgical History:   Procedure Laterality Date   • COLONOSCOPY N/A 10/7/2019    Procedure: COLONOSCOPY;  Surgeon: Brunner, Mark I, MD;  Location: [facility] ENDOSCOPY;  Service: Gastroenterology     General Information     Row Name 12/15/19 1634          PT Evaluation Time/Intention    Document Type  evaluation  -DM     Mode of Treatment  physical therapy  -DM     Row Name 12/15/19 8223          General Information    Patient Profile Reviewed?  yes  -DM     Prior Level of Function  independent:;all household mobility;gait;transfer;home management;shopping;using stairs;dependent:;driving INDEP gt w/o AD;Friends drive him to store,MD  -DM     Existing Precautions/Restrictions  other (see comments) MRSA;? C Diff r/o'd;ulcerative colitis (BM 30 X/Day);occ BRBPR;cannabis use daily PTA  -DM     Row Name 12/15/19 0171          Relationship/Environment    Lives With  alone  -DM     Row Name 12/15/19 163          Resource/Environmental Concerns    Current Living Arrangements  home/apartment/condo 1-ST.;tub/shower  -DM     Row Name 12/15/19 0649           Home Main Entrance    Number of Stairs, Main Entrance  two  -DM     Stair Railings, Main Entrance  none  -DM     Row Name 12/15/19 1634          Cognitive Assessment/Intervention- PT/OT    Orientation Status (Cognition)  oriented x 4  -DM     Row Name 12/15/19 1634          Safety Issues, Functional Mobility    Safety Issues Affecting Function (Mobility)  safety precautions follow-through/compliance;impulsivity impulsive w/ transfers;declines gt belt (explained safety precaut. to pt)  -DM     Impairments Affecting Function (Mobility)  endurance/activity tolerance;pain buttocks uncomfort.  -DM       User Key  (r) = Recorded By, (t) = Taken By, (c) = Cosigned By    Initials Name Provider Type    DM Marcelle Lipscomb, PT Physical Therapist        Mobility     Row Name 12/15/19 1634          Bed Mobility Assessment/Treatment    Bed Mobility Assessment/Treatment  bed mobility (all) activities  -DM     [place] Level (Bed Mobility)  independent  -DM     Row Name 12/15/19 1634          Transfer Assessment/Treatment    Comment (Transfers)  cues for HP  -DM     Row Name 12/15/19 1634          Bed-Chair Transfer    Bed-Chair [place] (Transfers)  conditional independence  -DM     Row Name 12/15/19 1634          Sit-Stand Transfer    Sit-Stand [place] (Transfers)  conditional independence has been up to [facility] for BM indep;awaiting PCT to empty  -DM     Assistive Device (Sit-Stand Transfers)  other (see comments) R Bedrail; declines gt gelt  -DM     Row Name 12/15/19 1634          Gait/Stairs Assessment/Training    Gait/Stairs Assessment/Training  gait/ambulation independence  -DM     [place] Level (Gait)  supervision  -DM     Assistive Device (Gait)  other (see comments) decl. gt belt;PT held waistband of pants  -DM     Distance in Feet (Gait)  30  -DM     Pattern (Gait)  step-through  -DM     Deviations/Abnormal Patterns (Gait)  mel decreased decr step length  -DM     Comment (Gait/Stairs)  Cues to incr.  step length; ;pt states it will occ. incr. to 140-150's;decl. stair trial or UIC; ret.to bed  -DM       User Key  (r) = Recorded By, (t) = Taken By, (c) = Cosigned By    Initials Name Provider Type    DM Marcelle Lipscomb, PT Physical Therapist        Obj/Interventions     Row Name 12/15/19 1634          General ROM    GENERAL ROM COMMENTS  no limit.  -DM     Row Name 12/15/19 1634          MMT (Manual Muscle Testing)    General MMT Comments  B LE's grossly 4+ to 5-/5  -DM     Row Name 12/15/19 1634          Therapeutic Exercise    Lower Extremity (Therapeutic Exercise)  gluteal sets;hamstring sets, bilateral;heel slides, bilateral;LAQ (long arc quad), bilateral;marching while seated;quad sets, bilateral;SAQ (short arc quad), bilateral  -DM     Lower Extremity Range of Motion (Therapeutic Exercise)  hip abduction/adduction, bilateral;hip internal/external rotation, bilateral;ankle dorsiflexion/plantar flexion, bilateral  -DM     Exercise Type (Therapeutic Exercise)  AROM (active range of motion);isometric contraction, static  -DM     Sets/Reps (Therapeutic Exercise)  did several reps of each,then stated he would finish after supper; issued HEP/reviewed  -DM     Expected Outcome (Therapeutic Exercise)  improve functional stability;improve functional tolerance, single extremity activity  -DM     Row Name 12/15/19 1638          Static Sitting Balance    Level of [place] (Unsupported Sitting, Static Balance)  independent  -DM     Sitting Position (Unsupported Sitting, Static Balance)  sitting on edge of bed  -DM     Time Able to Maintain Position (Unsupported Sitting, Static Balance)  1 to 2 minutes  -DM     Comment (Unsupported Sitting, Static Balance)  LE exer; PLB  -DM     Row Name 12/15/19 1639          Dynamic Sitting Balance    Level of [place], Reaches Outside Midline (Sitting, Dynamic Balance)  independent  -DM     Sitting Position, Reaches Outside Midline (Sitting, Dynamic Balance)  sitting on  edge of bed  -DM     Comment, Reaches Outside Midline (Sitting, Dynamic Balance)  RECIP scooting;flexed forward to adjust socks  -DM     Row Name 12/15/19 1634          Static Standing Balance    Level of [place] (Supported Standing, Static Balance)  independent  -DM     Time Able to Maintain Position (Supported Standing, Static Balance)  30 to 45 seconds  -DM     Comment (Supported Standing, Static Balance)  TRUNK ext while facing mirror  -DM     Row Name 12/15/19 1634          Dynamic Standing Balance    Level of [place], Reaches Outside Midline (Standing, Dynamic Balance)  supervision  -DM     Time Able to Maintain Position, Reaches Outside Midline (Standing, Dynamic Balance)  15 to 30 seconds  -DM     Assistive Device Utilized (Supported Standing, Dynamic Balance)  other (see comments) decl. gt belt; PT held waistband of pants  -DM     Comment, Reaches Outside Midline (Standing, Dynamic Balance)  WS to init sidesteps R along EOB  -DM       User Key  (r) = Recorded By, (t) = Taken By, (c) = Cosigned By    Initials Name Provider Type    DM Marcelle Lipscomb, PT Physical Therapist        Goals/Plan     Row Name 12/15/19 1634          Bed Mobility Goal 1 (PT)    Activity/Assistive Device (Bed Mobility Goal 1, PT)  bed mobility activities, all  -DM     [place] Level/Cues Needed (Bed Mobility Goal 1, PT)  independent  -DM     Time Frame (Bed Mobility Goal 1, PT)  short term goal (STG);1 day  -DM     Progress/Outcomes (Bed Mobility Goal 1, PT)  goal met  -DM     Row Name 12/15/19 1634          Transfer Goal 1 (PT)    Activity/Assistive Device (Transfer Goal 1, PT)  sit-to-stand/stand-to-sit;bed-to-chair/chair-to-bed;toilet stable VS  -DM     [place] Level/Cues Needed (Transfer Goal 1, PT)  independent  -DM     Time Frame (Transfer Goal 1, PT)  short term goal (STG);3 days  -DM     Progress/Outcome (Transfer Goal 1, PT)  goal partially met  -DM     Row Name 12/15/19 1634          Gait Training Goal 1  (PT)    Activity/Assistive Device (Gait Training Goal 1, PT)  gait (walking locomotion) Stable VS  -DM     [place] Level (Gait Training Goal 1, PT)  independent  -DM     Distance (Gait Goal 1, PT)  400  -DM     Time Frame (Gait Training Goal 1, PT)  short term goal (STG);3 days  -DM     Row Name 12/15/19 1634          Stairs Goal 1 (PT)    Activity/Assistive Device (Stairs Goal 1, PT)  ascending stairs;descending stairs Stable VS  -DM     [place] Level/Cues Needed (Stairs Goal 1, PT)  independent  -DM     Number of Stairs (Stairs Goal 1, PT)  2  -DM     Time Frame (Stairs Goal 1, PT)  short term goal (STG);3 days  -DM     Row Name 12/15/19 1634          Patient Education Goal (PT)    Activity (Patient Education Goal, PT)  HEP exer  -DM     [place]/Cues/Accuracy (Memory Goal 2, PT)  demonstrates adequately;verbalizes understanding  -DM     Time Frame (Patient Education Goal, PT)  short term goal (STG);3 days  -DM     Progress/Outcome (Patient Education Goal, PT)  goal partially met  -DM       User Key  (r) = Recorded By, (t) = Taken By, (c) = Cosigned By    Initials Name Provider Type    DM Marcelle Lipscomb, PT Physical Therapist        Clinical Impression     Row Name 12/15/19 1634          Pain Assessment    Additional Documentation  Pain Scale: FACES Pre/Post-Treatment (Group)  -DM     Row Name 12/15/19 1634          Pain Scale: Numbers Pre/Post-Treatment    Pain Location  other (see comments) Buttocks  -DM     Pain Intervention(s)  Repositioned;Rest  -DM     Row Name 12/15/19 6376          Pain Scale: FACES Pre/Post-Treatment    Pain: FACES Scale, Pretreatment  2-->hurts little bit  -DM     Pain: FACES Scale, Post-Treatment  2-->hurts little bit  -DM     Row Name 12/15/19 1634          Physical Therapy Clinical Impression    Patient/Family Goals Statement (PT Clinical Impression)  incr. leg strength;ret. to PLOF  -DM     Criteria for Skilled Interventions Met (PT Clinical Impression)  yes;treatment  indicated  -DM     Rehab Potential (PT Clinical Summary)  good, to achieve stated therapy goals  -DM     Row Name 12/15/19 1634          Vital Signs    Pre Systolic BP Rehab  108  -DM     Pre Treatment Diastolic BP  72  -DM     Post Systolic BP Rehab  107  -DM     Post Treatment Diastolic BP  63  -DM     Pretreatment Heart Rate (beats/min)  90 irreg  -DM     Intratreatment Heart Rate (beats/min)  133  -DM     Posttreatment Heart Rate (beats/min)  82  -DM     O2 Delivery Pre Treatment  room air  -DM     O2 Delivery Intra Treatment  room air  -DM     O2 Delivery Post Treatment  room air  -DM     Pre Patient Position  Supine  -DM     Intra Patient Position  Standing  -DM     Post Patient Position  Supine  -DM     Row Name 12/15/19 1634          Positioning and Restraints    Pre-Treatment Position  in bed  -DM     Post Treatment Position  bed  -DM     In Bed  notified nsg;fowlers;call light within reach;encouraged to call for assist;heels elevated  -DM       User Key  (r) = Recorded By, (t) = Taken By, (c) = Cosigned By    Initials Name Provider Type    Marcelle Valladares, PT Physical Therapist        Outcome Measures     Row Name 12/15/19 1634          How much help from another person do you currently need...    Turning from your back to your side while in flat bed without using bedrails?  4  -DM     Moving from lying on back to sitting on the side of a flat bed without bedrails?  4  -DM     Moving to and from a bed to a chair (including a wheelchair)?  4  -DM     Standing up from a chair using your arms (e.g., wheelchair, bedside chair)?  3  -DM     Climbing 3-5 steps with a railing?  3  -DM     To walk in hospital room?  4  -DM     AM-PAC 6 Clicks Score (PT)  22  -DM     Row Name 12/15/19 1634          Functional Assessment    Outcome Measure Options  AM-PAC 6 Clicks Basic Mobility (PT)  -DM       User Key  (r) = Recorded By, (t) = Taken By, (c) = Cosigned By    Initials Name Provider Type    Marcelle Valladares,  PT Physical Therapist          PT Recommendation and Plan  Planned Therapy Interventions (PT Eval): balance training, gait training, home exercise program, patient/family education, stair training, strengthening, transfer training  Outcome Summary/Treatment Plan (PT)  Anticipated Discharge Disposition (PT): home with assist  Plan of Care Reviewed With: patient  Progress: improving  Outcome Summary: Presents w/ stable clinical status (?MRSA, C Diff r/o'd), AF/RVR, B LE weakness, decr. endurance, & impaired gt.in [place] & on stairs; able to amb 30 ft in room w/supv (HR to 133) & perform LE ther exer per issued HEP (indep in sit/stand bal. activ), but decl. gt in [place] & on stairs;c/o butt.pain & (F) diarrhea;noted low HGB (7.3); Needs stair trng,gt trial in [place], & to review HEP again; anticipate d/c home w/ asst from friends; will not require skilled PT services s/p d/c     Time Calculation:   PT Charges     Row Name 12/15/19 4923             Time Calculation    Start Time  1634  -DM      PT Received On  12/15/19  -DM      PT Goal Re-Cert Due Date  12/25/19  -DM         Time Calculation- PT    Total Timed Code Minutes- PT  23 minute(s)  -DM         Timed Charges    08303 - Gait Training Minutes   10  -DM      51831 - PT Therapeutic Activity Minutes  13  -DM        User Key  (r) = Recorded By, (t) = Taken By, (c) = Cosigned By    Initials Name Provider Type    DM Marcelle Lipscomb, PT Physical Therapist        Therapy Charges for Today     Code Description Service Date Service Provider Modifiers Qty    89261509345 HC GAIT TRAINING EA 15 MIN 12/15/2019 Marcelle Lipscomb, PT GP 1    92855451957 HC PT THERAPEUTIC ACT EA 15 MIN 12/15/2019 Marcelle Lipscomb, PT GP 1    64909792527 HC PT EVAL LOW COMPLEXITY 4 12/15/2019 Marcelle Lipscomb, PT GP 1          PT G-Codes  Outcome Measure Options: AM-PAC 6 Clicks Basic Mobility (PT)  AM-PAC 6 Clicks Score (PT): 22    Marcelle Lipscomb, PAKO  12/15/2019

## 2019-12-16 ENCOUNTER — TELEPHONE (OUTPATIENT)
Dept: GASTROENTEROLOGY | Facility: CLINIC | Age: 49
End: 2019-12-16

## 2019-12-16 LAB
ANION GAP SERPL CALCULATED.3IONS-SCNC: 6 MMOL/L (ref 5–15)
BUN BLD-MCNC: 11 MG/DL (ref 6–20)
BUN/CREAT SERPL: 18 (ref 7–25)
CALCIUM SPEC-SCNC: 7 MG/DL (ref 8.6–10.5)
CALPROTECTIN STL-MCNT: 880 UG/G (ref 0–120)
CHLORIDE SERPL-SCNC: 101 MMOL/L (ref 98–107)
CO2 SERPL-SCNC: 24 MMOL/L (ref 22–29)
CREAT BLD-MCNC: 0.61 MG/DL (ref 0.76–1.27)
DEPRECATED RDW RBC AUTO: 53.1 FL (ref 37–54)
ERYTHROCYTE [DISTWIDTH] IN BLOOD BY AUTOMATED COUNT: 17.5 % (ref 12.3–15.4)
GFR SERPL CREATININE-BSD FRML MDRD: 139 ML/MIN/1.73
GLUCOSE BLD-MCNC: 112 MG/DL (ref 65–99)
H CAPSUL AG UR-MCNC: <0.5 NG/ML
HCT VFR BLD AUTO: 30.3 % (ref 37.5–51)
HCT VFR BLD AUTO: 31.1 % (ref 37.5–51)
HGB BLD-MCNC: 9.2 G/DL (ref 13–17.7)
HGB BLD-MCNC: 9.5 G/DL (ref 13–17.7)
Lab: NORMAL
MCH RBC QN AUTO: 25.6 PG (ref 26.6–33)
MCHC RBC AUTO-ENTMCNC: 30.5 G/DL (ref 31.5–35.7)
MCV RBC AUTO: 83.8 FL (ref 79–97)
PLATELET # BLD AUTO: 250 10*3/MM3 (ref 140–450)
PMV BLD AUTO: 8.7 FL (ref 6–12)
POTASSIUM BLD-SCNC: 4.7 MMOL/L (ref 3.5–5.2)
RBC # BLD AUTO: 3.71 10*6/MM3 (ref 4.14–5.8)
SODIUM BLD-SCNC: 131 MMOL/L (ref 136–145)
WBC NRBC COR # BLD: 10.91 10*3/MM3 (ref 3.4–10.8)

## 2019-12-16 PROCEDURE — 99232 SBSQ HOSP IP/OBS MODERATE 35: CPT | Performed by: PHYSICIAN ASSISTANT

## 2019-12-16 PROCEDURE — 85027 COMPLETE CBC AUTOMATED: CPT | Performed by: HOSPITALIST

## 2019-12-16 PROCEDURE — 25010000002 VANCOMYCIN PER 500 MG: Performed by: HOSPITALIST

## 2019-12-16 PROCEDURE — 80048 BASIC METABOLIC PNL TOTAL CA: CPT | Performed by: HOSPITALIST

## 2019-12-16 PROCEDURE — 99233 SBSQ HOSP IP/OBS HIGH 50: CPT | Performed by: HOSPITALIST

## 2019-12-16 PROCEDURE — 25010000002 METHYLPREDNISOLONE PER 40 MG: Performed by: INTERNAL MEDICINE

## 2019-12-16 RX ORDER — BENZONATATE 100 MG/1
100 CAPSULE ORAL 3 TIMES DAILY PRN
Status: DISCONTINUED | OUTPATIENT
Start: 2019-12-16 | End: 2019-12-22 | Stop reason: HOSPADM

## 2019-12-16 RX ADMIN — SULFASALAZINE 1000 MG: 500 TABLET ORAL at 20:54

## 2019-12-16 RX ADMIN — METHYLPREDNISOLONE SODIUM SUCCINATE 40 MG: 40 INJECTION, POWDER, LYOPHILIZED, FOR SOLUTION INTRAMUSCULAR; INTRAVENOUS at 06:39

## 2019-12-16 RX ADMIN — METHYLPREDNISOLONE SODIUM SUCCINATE 40 MG: 40 INJECTION, POWDER, LYOPHILIZED, FOR SOLUTION INTRAMUSCULAR; INTRAVENOUS at 17:11

## 2019-12-16 RX ADMIN — SULFASALAZINE 1000 MG: 500 TABLET ORAL at 13:52

## 2019-12-16 RX ADMIN — VANCOMYCIN HYDROCHLORIDE 750 MG: 750 INJECTION, SOLUTION INTRAVENOUS at 00:33

## 2019-12-16 RX ADMIN — SULFASALAZINE 1000 MG: 500 TABLET ORAL at 06:38

## 2019-12-16 RX ADMIN — FERROUS SULFATE TAB 325 MG (65 MG ELEMENTAL FE) 650 MG: 325 (65 FE) TAB at 08:37

## 2019-12-16 RX ADMIN — FOLIC ACID 1 MG: 1 TABLET ORAL at 08:37

## 2019-12-16 RX ADMIN — BENZONATATE 100 MG: 100 CAPSULE ORAL at 21:04

## 2019-12-16 RX ADMIN — SODIUM CHLORIDE, PRESERVATIVE FREE 10 ML: 5 INJECTION INTRAVENOUS at 20:55

## 2019-12-16 RX ADMIN — FERROUS SULFATE TAB 325 MG (65 MG ELEMENTAL FE) 650 MG: 325 (65 FE) TAB at 20:54

## 2019-12-16 RX ADMIN — PANTOPRAZOLE SODIUM 40 MG: 40 TABLET, DELAYED RELEASE ORAL at 06:38

## 2019-12-16 NOTE — PROGRESS NOTES
"GI Daily Progress Note  Subjective:    Chief Complaint:  Follow up pan ulcerative colitis     No significant change overnight; possibly mild improvement in diarrhea but still present and with associated urgency.   Denies any abdominal pain.   States he is eating well.       Objective:    BP 94/56 (BP Location: Right arm, Patient Position: Lying)   Pulse 74   Temp 97.6 °F (36.4 °C) (Oral)   Resp 16   Ht 182.9 cm (72\")   Wt 52 kg (114 lb 9.6 oz)   SpO2 91%   BMI 15.54 kg/m²     Physical Exam   Constitutional:   Appears chronically ill and underweight.   Laying in the bed in dark room with covers over his head    Cardiovascular: Normal rate and regular rhythm.   Pulmonary/Chest: Effort normal. No respiratory distress.   Abdominal: Soft. Bowel sounds are normal. He exhibits no distension. There is no tenderness.   Musculoskeletal: He exhibits no edema.   Sarcopenia    Skin: Skin is warm and dry.   Nursing note and vitals reviewed.      Lab  Lab Results   Component Value Date    WBC 10.91 (H) 12/16/2019    HGB 9.5 (L) 12/16/2019    HGB 9.2 (L) 12/15/2019    HGB 9.5 (L) 12/15/2019    MCV 83.8 12/16/2019     12/16/2019    INR 1.28 (H) 12/13/2019       Lab Results   Component Value Date    GLUCOSE 112 (H) 12/16/2019    BUN 11 12/16/2019    CREATININE 0.61 (L) 12/16/2019    EGFRIFNONA 139 12/16/2019    BCR 18.0 12/16/2019     (L) 12/16/2019    K 4.7 12/16/2019    CO2 24.0 12/16/2019    CALCIUM 7.0 (L) 12/16/2019    ALBUMIN 1.60 (L) 12/13/2019    ALKPHOS 152 (H) 12/13/2019    BILITOT 0.4 12/13/2019    ALT 23 12/13/2019    AST 35 12/13/2019       Assessment:    Severe pan-ulcerative colitis with hemorrhage.  Chronic blood loss anemia.  Non-volitional weight loss.  Severe protein calorie malnutrition.    Plan:    >>> Continue IV Solumedrol 40 mg BID  >>> Continue Sulfasalazine and Folic acid   >>> Provided him with patient assistance form to complete to begin process for Liv.       Aurea Costello, " PA  12/16/19  10:41 AM

## 2019-12-16 NOTE — PROGRESS NOTES
[facility]  PROGRESS NOTE    Patient Name: Rafa Dan  : 1970  MRN: [id number]    Date of Admission: 2019  Primary Care Physician: Diego García APRN    Subjective   Subjective     CC:  F/U diarrhea    HPI:  Patient seen this morning. Eating well. Diarrhea slowing down. Weakness improving.    Review of Systems  Gen-no fevers, no chills  CV-no chest pain, no palpitations  Resp-no cough, no dyspnea  GI-no N/V, +diarrhea, no abd pain    All other systems reviewed and negative except any additional pertinent positives and negatives as discussed in HPI.    Objective   Objective     Vital Signs:   Temp:  [97.6 °F (36.4 °C)-97.7 °F (36.5 °C)] 97.6 °F (36.4 °C)  Heart Rate:  [74-84] 74  Resp:  [16-17] 16  BP: ()/(56-64) 94/56        Physical Exam:  Gen-no acute distress, cachectic   HENT-NCAT, mucous membranes moist  CV-RRR, S1 S2 normal, no m/r/g  Resp-CTAB, no wheezes or rales  Abd-soft, NT, ND, +BS  Ext-no edema  Neuro-A&Ox3, no focal deficits  Skin-no rashes  Psych-appropriate mood    Results Reviewed:    Results from last 7 days   Lab Units 19  0656 12/15/19  2328 12/15/19  1603 12/15/19  0745  19  0240  19  1315   WBC 10*3/mm3 10.91*  --   --  8.39  --  4.99  --  7.61   HEMOGLOBIN g/dL 9.5* 9.2* 9.5* 9.3*   < > 7.3*   < > 8.2*   HEMOGLOBIN, POC   --   --   --   --   --   --    < >  --    HEMATOCRIT % 31.1* 30.3* 30.8* 30.2*   < > 24.6*   < > 28.6*   HEMATOCRIT POC   --   --   --   --   --   --    < >  --    PLATELETS 10*3/mm3 250  --   --  265  --  402  --  547*   INR   --   --   --   --   --   --   --  1.28*   PROCALCITONIN ng/mL  --   --   --   --   --   --   --  0.54*    < > = values in this interval not displayed.     Results from last 7 days   Lab Units 19  0657 12/15/19  0745 19  0240  19  1315   SODIUM mmol/L 131* 131* 131*  --  130*   POTASSIUM mmol/L 4.7 4.7 4.2  --  4.2   CHLORIDE mmol/L 101  105 102  --  95*   CO2 mmol/L 24.0 20.0* 21.0*  --  19.0*   BUN mg/dL 11 12 11  --  13   CREATININE mg/dL 0.61* 0.84 0.84   < > 1.09   GLUCOSE mg/dL 112* 127* 140*  --  117*   CALCIUM mg/dL 7.0* 6.8* 6.9*  --  7.9*   ALT (SGPT) U/L  --   --   --   --  23   AST (SGOT) U/L  --   --   --   --  35    < > = values in this interval not displayed.     Estimated Creatinine Clearance: 105.4 mL/min (A) (by C-G formula based on SCr of 0.61 mg/dL (L)).    Microbiology Results Abnormal     Procedure Component Value - Date/Time    Blood Culture - Blood, Arm, Right [[id number]] Collected:  12/14/19 2330    Lab Status:  Preliminary result Specimen:  Blood from Arm, Right Updated:  12/16/19 0315     Blood Culture No growth at 24 hours    Blood Culture - Blood, Hand, Right [[id number]] Collected:  12/14/19 2355    Lab Status:  Preliminary result Specimen:  Blood from Hand, Right Updated:  12/16/19 0315     Blood Culture No growth at 24 hours    Blood Culture - Blood, Arm, Right [[id number]] Collected:  12/13/19 1315    Lab Status:  Preliminary result Specimen:  Blood from Arm, Right Updated:  12/15/19 1345     Blood Culture No growth at 2 days    Blood Culture - Blood, Arm, Right [[id number]]  (Abnormal) Collected:  12/13/19 1310    Lab Status:  Final result Specimen:  Blood from Arm, Right Updated:  12/15/19 1232     Blood Culture Staphylococcus, coagulase negative     Comment: Probable contaminant requires clinical correlation, susceptibility not performed unless requested by physician.          Isolated from Aerobic Bottle     Gram Stain Aerobic Bottle Gram positive cocci in clusters    Blood Culture ID, PCR - Blood, Arm, Right [[id number]]  (Abnormal) Collected:  12/13/19 1310    Lab Status:  Edited Result - FINAL Specimen:  Blood from Arm, Right Updated:  12/15/19 0621     BCID, PCR Staphylococcus spp, not aureus. Identification by BCID PCR.     Comment: Corrected result. Previous result was Staphylococcus aureus. mecA (methicillin  resistance gene) detected. Identification by BCID PCR. on 12/14/2019 at 2118 EST.       Gastrointestinal Panel, PCR - Stool, Per Rectum [[id number]]  (Normal) Collected:  12/13/19 1545    Lab Status:  Final result Specimen:  Stool from Per Rectum Updated:  12/14/19 0044     Campylobacter Not Detected     Plesiomonas shigelloides Not Detected     Salmonella Not Detected     Vibrio Not Detected     Vibrio cholerae Not Detected     Yersinia enterocolitica Not Detected     Enteroaggregative E. coli (EAEC) Not Detected     Enteropathogenic E. coli (EPEC) Not Detected     Enterotoxigenic E. coli (ETEC) lt/st Not Detected     Shiga-like toxin-producing E. coli (STEC) stx1/stx2 Not Detected     E. coli O157 Not Detected     Shigella/Enteroinvasive E. coli (EIEC) Not Detected     Cryptosporidium Not Detected     Cyclospora cayetanensis Not Detected     Entamoeba histolytica Not Detected     Giardia lamblia Not Detected     Adenovirus F40/41 Not Detected     Astrovirus Not Detected     Norovirus GI/GII Not Detected     Rotavirus A Not Detected     Sapovirus (I, II, IV or V) Not Detected    Narrative:       If Aeromonas, Staphylococcus aureus or Bacillus cereus are suspected, please order [id number]: Stool Culture, Aeromonas, S aureus, B Cereus.    Clostridium Difficile Toxin - Stool, Per Rectum [[id number]] Collected:  12/13/19 1545    Lab Status:  Final result Specimen:  Stool from Per Rectum Updated:  12/13/19 1731    Narrative:       The following orders were created for panel order Clostridium Difficile Toxin - Stool, Per Rectum.  Procedure                               Abnormality         Status                     ---------                               -----------         ------                     Clostridium Difficile To...[[id number]]  Normal              Final result                 Please view results for these tests on the individual orders.    Clostridium Difficile Toxin, PCR - Stool, Per Rectum [[id number]]  (Normal)  Collected:  12/13/19 1545    Lab Status:  Final result Specimen:  Stool from Per Rectum Updated:  12/13/19 1731     C. Difficile Toxins by PCR Not Detected    Narrative:       Performance characteristics of test not established for patients <2 years of age.  Negative for Toxigenic C. Difficile    Fecal Leukocytes - Stool, Per Rectum [[id number]]  (Abnormal) Collected:  12/13/19 1545    Lab Status:  Final result Specimen:  Stool from Per Rectum Updated:  12/13/19 1717     Fecal Leukocytes Many (4+) WBC's Seen          Imaging Results (Last 24 Hours)     ** No results found for the last 24 hours. **               I have reviewed the medications:  Scheduled Meds:    ferrous sulfate 650 mg Oral BID   folic acid 1 mg Oral Daily   methylPREDNISolone sodium succinate 40 mg Intravenous Q12H   pantoprazole 40 mg Oral Q AM   sodium chloride 10 mL Intravenous Q12H   sulfaSALAzine 1,000 mg Oral Q8H     Continuous Infusions:     PRN Meds:.•  acetaminophen **OR** acetaminophen **OR** acetaminophen  •  calcium gluconate IVPB **AND** calcium gluconate IVPB **AND** Calcium, Ionized  •  magnesium sulfate **OR** magnesium sulfate **OR** magnesium sulfate  •  ondansetron **OR** ondansetron  •  potassium chloride **OR** potassium chloride **OR** potassium chloride  •  sodium chloride  •  sodium chloride      Assessment/Plan   Assessment / Plan     Active Hospital Problems    Diagnosis  POA   • **Ulcerative colitis (CMS/HCC) [K51.90]  Unknown   • Severe chronic malnutrition (CMS/HCC) [E43]  Yes   • Hyponatremia [E87.1]  Unknown   • Lactic acidosis [E87.2]  Unknown   • Intractable diarrhea [R19.7]  Yes   • Unintentional weight loss [R63.4]  Yes   • Thrombocytosis (CMS/HCC) [D47.3]  Yes   • Iron deficiency anemia [D50.9]  Yes      Resolved Hospital Problems   No resolved problems to display.        Brief Hospital Course to date:  Rafa Dan is a 49 y.o. male with PMH of prior tobacco abuse and relatively recent diagnosis of  ulcerative colitis (August 2019) who presents with intractable diarrhea and unintentional weight loss concerning for UC flare.     Intractable diarrhea  Ulcerative colitis flare   GI bleed  Acute blood loss anemia  --Continue IV Solumedrol 40 mg BID. Anticipate can transition to prednisone taper once clinically improved.  --C.diff and GI PCR panel are negative. Levaquin/Flagyl discontinued.  --Continue Sulfasalazine per home regimen.   --GI following. Plan to start Humira as outpatient.  --Did not respond to 1 unit PRBC initially so 2 additional units transfused with improvement. H&H now stable. Continue to monitor.     Lactic Acidosis  --Resolved with IV fluids. Continue.    Positive blood culture  --NOT MRSA (BCID was corrected). Returned with coag negative Staph, probable contaminant. Will stop Vanc. Repeat blood cultures no growth.     Hyponatremia  --Likely hypovolemic hyponatremia.  --Continue fluids to offset GI losses.    Hx of Iron Deficiency Anemia  --Continue oral iron replacement.     Thrombocytosis  --Likely reactive due to above.  --Resolved.    Chronic severe malnutrition  --Has good po intake here.  --Nutrition following.    DVT Prophylaxis:  mechanical    Disposition: I expect the patient to be discharged home ~1-2 days once diarrhea improved enough    CODE STATUS:   Code Status and Medical Interventions:   Ordered at: 12/13/19 1521     Level Of Support Discussed With:    Patient     Code Status:    CPR     Medical Interventions (Level of Support Prior to Arrest):    Full         Electronically signed by Trina Guerrero MD, 12/16/19, 12:57 PM.

## 2019-12-16 NOTE — PROGRESS NOTES
Clinical Nutrition   Reason For Visit: Follow-up protocol    Patient Name: Rafa Dan  YOB: 1970  MRN: [id number]  Date of Encounter: 12/16/19 11:39 AM  Admission date: 12/13/2019      RD advised patient to begin taking a daily MVI at home.      RD recommends adding the following supplements to inpatient medications:  -daily MVI  -daily calcium/vitamin D        Nutrition Assessment     Admission Problem List:  Diarrhea  N/V  Hyponatremia  Iron deficiency anemia s/p blood transfusion  GIB  Melena  Ulcerative colitis flare  Chronic blood loss anemia  Severe, Chronic Malnutrition (dx per RD 12/14, attested by MD)      Applicable PMH:  Ulcerative Colitis (dx 8/2019)  Tobacco use  THC use      Applicable medical tests/procedures since admission:       Reported/Observed/Food/Nutrition Related History   Patient reports good appetite and PO intake during the past couple of days. States he eats 100% at meals (except for last night because he doesn't eat kale which was on meal tray). States he is drinking 3 Boost Plus supplements daily. Tolerating PO at this time. No preferences/requests. Patient states he does not take a daily MVI at home - RD recommended that he begin after discharge. RD notes plan for Humira per GI note (12/15).      Anthropometrics   Height: 72 in  Weight: 114 lbs (bed scale weight 12/13 per [facility] doc)  BMI: 15.5  BMI classification: Underweight:<18.5kg/m2   IBW: 178 lbs    Per RD note (12/14):  UBW: 155 lbs (7/2019 per patient); patient reports standing wt of 110 lbs 1.5 weeks prior to this admission   Weight change: Unintentional weight loss of 45 lbs (29%) x 5 months - based on  lbs and standing weight 110 lbs 1.5 weeks prior to this admission.      Labs reviewed   Labs reviewed: Yes    Medications reviewed   Medications reviewed: Yes  Pertinent: iron, folate, steroid, protonix, vancomycin, sulfasalazine    Current Nutrition Prescription   PO: Diet Regular  Oral Nutrition  Supplement: Boost Plus 3x daily    Evaluation of Received Nutrient/Fluid Intake: insufficient data      Nutrition Diagnosis     12/14  Problem Malnutrition  (severe, chronic)   Etiology Diarrhea in the setting of ulcerative colitis   Signs/Symptoms <75% of usual PO intake > 1 month; Unintentional weight loss of 45 lbs (29%) x 5 months   Status: ongoing      Intervention   Intervention: Follow treatment progress, Care plan reviewed     RD advised patient to begin taking a daily MVI at home.    RD recommends adding the following supplements to inpatient medications:  -daily MVI  -daily calcium/vitamin D      Goal:   General: Nutrition support treatment  PO: Maintain intake     Additional goals: No further weight loss      Monitoring/Evaluation:     Monitoring/Evaluation: Per protocol, PO intake, Supplement intake, Weight, GI status, Symptoms  Will Continue to follow per protocol  Mary Nunes RD  Time Spent: 35 min

## 2019-12-16 NOTE — PLAN OF CARE
Patient has denied any complaints of pain or discomforts this shift.  Hgb and Hct levels checked every 8 hours as ordered with no decrease noted this shift.  Patient has had several episodes of loose stool, red streaked this shift.  Vital signs have remained stable.  No acute changes or distress noted, will continue to monitor.

## 2019-12-16 NOTE — TELEPHONE ENCOUNTER
I called patient back to see how is doing with UC symptoms while being on meds. No answer; left voice message.

## 2019-12-16 NOTE — PROGRESS NOTES
Continued Stay Note  [facility]     Patient Name: Rafa Dan  MRN: [id number]  Today's Date: 12/16/2019    Admit Date: 12/13/2019    Discharge Plan     Row Name 12/16/19 1608       Plan    Plan  Home    Patient/Family in Agreement with Plan  yes    Plan Comments  Spoke with patient.  He denied discharge needs at this time.  Completed application for assistance with Humira is in patient's chartlet.      Final Discharge Disposition Code  01 - home or self-care        Discharge Codes    No documentation.             Wendy Rogers RN

## 2019-12-16 NOTE — PLAN OF CARE
VSS, pt denied any complaints during the shift, will continue to monitor.     Problem: Patient Care Overview  Goal: Plan of Care Review  Outcome: Ongoing (interventions implemented as appropriate)  Flowsheets  Taken 12/15/2019 5575 by Marcelle Lipscomb, PT  Progress: improving  Plan of Care Reviewed With: patient     Problem: Patient Care Overview  Goal: Individualization and Mutuality  Outcome: Ongoing (interventions implemented as appropriate)     Problem: Patient Care Overview  Goal: Discharge Needs Assessment  Outcome: Ongoing (interventions implemented as appropriate)     Problem: Patient Care Overview  Goal: Interprofessional Rounds/Family Conf  Outcome: Ongoing (interventions implemented as appropriate)     Problem: Fall Risk (Adult)  Goal: Absence of Fall  Outcome: Ongoing (interventions implemented as appropriate)     Problem: Fluid Volume Deficit (Adult)  Goal: Optimal Fluid Balance  Outcome: Ongoing (interventions implemented as appropriate)

## 2019-12-16 NOTE — TELEPHONE ENCOUNTER
----- Message from DANA Banks sent at 11/29/2019  3:42 PM EST -----  Regarding: Courtesy Call  Faizan Dent ask pt if his UC sx are better now with being on his meds.   Thanks

## 2019-12-17 LAB
ANION GAP SERPL CALCULATED.3IONS-SCNC: 6 MMOL/L (ref 5–15)
BUN BLD-MCNC: 14 MG/DL (ref 6–20)
BUN/CREAT SERPL: 23 (ref 7–25)
CALCIUM SPEC-SCNC: 7.4 MG/DL (ref 8.6–10.5)
CHLORIDE SERPL-SCNC: 102 MMOL/L (ref 98–107)
CO2 SERPL-SCNC: 25 MMOL/L (ref 22–29)
CREAT BLD-MCNC: 0.61 MG/DL (ref 0.76–1.27)
DEPRECATED RDW RBC AUTO: 53.3 FL (ref 37–54)
ERYTHROCYTE [DISTWIDTH] IN BLOOD BY AUTOMATED COUNT: 18.1 % (ref 12.3–15.4)
GFR SERPL CREATININE-BSD FRML MDRD: 139 ML/MIN/1.73
GLUCOSE BLD-MCNC: 97 MG/DL (ref 65–99)
HCT VFR BLD AUTO: 31.5 % (ref 37.5–51)
HGB BLD-MCNC: 9.7 G/DL (ref 13–17.7)
MCH RBC QN AUTO: 25.7 PG (ref 26.6–33)
MCHC RBC AUTO-ENTMCNC: 30.8 G/DL (ref 31.5–35.7)
MCV RBC AUTO: 83.6 FL (ref 79–97)
PLATELET # BLD AUTO: 266 10*3/MM3 (ref 140–450)
PMV BLD AUTO: 9.3 FL (ref 6–12)
POTASSIUM BLD-SCNC: 4.7 MMOL/L (ref 3.5–5.2)
RBC # BLD AUTO: 3.77 10*6/MM3 (ref 4.14–5.8)
SODIUM BLD-SCNC: 133 MMOL/L (ref 136–145)
WBC NRBC COR # BLD: 14.85 10*3/MM3 (ref 3.4–10.8)

## 2019-12-17 PROCEDURE — 80048 BASIC METABOLIC PNL TOTAL CA: CPT | Performed by: HOSPITALIST

## 2019-12-17 PROCEDURE — 99232 SBSQ HOSP IP/OBS MODERATE 35: CPT | Performed by: HOSPITALIST

## 2019-12-17 PROCEDURE — 25010000002 METHYLPREDNISOLONE PER 40 MG: Performed by: INTERNAL MEDICINE

## 2019-12-17 PROCEDURE — 99232 SBSQ HOSP IP/OBS MODERATE 35: CPT | Performed by: INTERNAL MEDICINE

## 2019-12-17 PROCEDURE — 85027 COMPLETE CBC AUTOMATED: CPT | Performed by: HOSPITALIST

## 2019-12-17 RX ADMIN — FERROUS SULFATE TAB 325 MG (65 MG ELEMENTAL FE) 650 MG: 325 (65 FE) TAB at 21:11

## 2019-12-17 RX ADMIN — SULFASALAZINE 1000 MG: 500 TABLET ORAL at 14:04

## 2019-12-17 RX ADMIN — SULFASALAZINE 1000 MG: 500 TABLET ORAL at 21:11

## 2019-12-17 RX ADMIN — METHYLPREDNISOLONE SODIUM SUCCINATE 40 MG: 40 INJECTION, POWDER, LYOPHILIZED, FOR SOLUTION INTRAMUSCULAR; INTRAVENOUS at 16:49

## 2019-12-17 RX ADMIN — FOLIC ACID 1 MG: 1 TABLET ORAL at 08:46

## 2019-12-17 RX ADMIN — METHYLPREDNISOLONE SODIUM SUCCINATE 40 MG: 40 INJECTION, POWDER, LYOPHILIZED, FOR SOLUTION INTRAMUSCULAR; INTRAVENOUS at 05:59

## 2019-12-17 RX ADMIN — SODIUM CHLORIDE, PRESERVATIVE FREE 10 ML: 5 INJECTION INTRAVENOUS at 21:11

## 2019-12-17 RX ADMIN — FERROUS SULFATE TAB 325 MG (65 MG ELEMENTAL FE) 650 MG: 325 (65 FE) TAB at 08:46

## 2019-12-17 RX ADMIN — SULFASALAZINE 1000 MG: 500 TABLET ORAL at 05:59

## 2019-12-17 RX ADMIN — SODIUM CHLORIDE, PRESERVATIVE FREE 10 ML: 5 INJECTION INTRAVENOUS at 08:46

## 2019-12-17 RX ADMIN — PANTOPRAZOLE SODIUM 40 MG: 40 TABLET, DELAYED RELEASE ORAL at 05:59

## 2019-12-17 NOTE — PLAN OF CARE
Problem: Patient Care Overview  Goal: Plan of Care Review  Outcome: Ongoing (interventions implemented as appropriate)  Flowsheets (Taken 12/17/2019 5596)  Progress: improving  Plan of Care Reviewed With: patient  Outcome Summary: pt has had ~8 BMs and has nopt c/o pain or nausea. Trying to sleep in between care. VSS, will continue to monitor  Goal: Individualization and Mutuality  Outcome: Ongoing (interventions implemented as appropriate)  Goal: Discharge Needs Assessment  Outcome: Ongoing (interventions implemented as appropriate)  Goal: Interprofessional Rounds/Family Conf  Outcome: Ongoing (interventions implemented as appropriate)

## 2019-12-17 NOTE — PROGRESS NOTES
[facility] Medicine Services  PROGRESS NOTE    Patient Name: Rafa Dan  : 1970  MRN: [id number]    Date of Admission: 2019  Primary Care Physician: Diego García APRN    Subjective   Subjective     CC:  Follow-up diarrhea    HPI:  Patient continues to have significant frequency of dark brown diarrhea, up to every other hour. No abdominal pain, nausea, or vomiting. Has a mild cough productive of clear sputum but no dyspnea, chest pain, fevers, or chills.     Review of Systems  Gen- No fevers, chills  CV- No chest pain, palpitations  Resp- No cough, dyspnea  GI- As above    Objective   Objective     Vital Signs:   Temp:  [98.2 °F (36.8 °C)] 98.2 °F (36.8 °C)  Heart Rate:  [] 113  Resp:  [16-18] 18  BP: ()/(60-97) 91/60     Physical Exam:  Constitutional: No acute distress, awake, alert, thin, chronically ill-appearing  HENT: NCAT, mucous membranes moist  Respiratory: Clear to auscultation bilaterally, respiratory effort normal   Cardiovascular: RRR, no murmurs, rubs, or gallops, palpable pedal pulses bilaterally  Gastrointestinal: Positive bowel sounds, soft, nontender, nondistended, no guarding or rebound   Musculoskeletal: No bilateral ankle edema  Psychiatric: Appropriate affect, cooperative  Neurologic: Strength symmetric in all extremities  Skin: numerous tattoos to extremities and trunk      Results Reviewed:    Results from last 7 days   Lab Units 19  0615 19  0656 12/15/19  2328  12/15/19  0745  19  1315   WBC 10*3/mm3 14.85* 10.91*  --   --  8.39   < > 7.61   HEMOGLOBIN g/dL 9.7* 9.5* 9.2*   < > 9.3*   < > 8.2*   HEMOGLOBIN, POC   --   --   --   --   --    < >  --    HEMATOCRIT % 31.5* 31.1* 30.3*   < > 30.2*   < > 28.6*   HEMATOCRIT POC   --   --   --   --   --    < >  --    PLATELETS 10*3/mm3 266 250  --   --  265   < > 547*   INR   --   --   --   --   --   --  1.28*   PROCALCITONIN ng/mL  --   --   --   --   --   --   0.54*    < > = values in this interval not displayed.     Results from last 7 days   Lab Units 12/17/19  0615 12/16/19  0657 12/15/19  0745  12/13/19  1315   SODIUM mmol/L 133* 131* 131*   < > 130*   POTASSIUM mmol/L 4.7 4.7 4.7   < > 4.2   CHLORIDE mmol/L 102 101 105   < > 95*   CO2 mmol/L 25.0 24.0 20.0*   < > 19.0*   BUN mg/dL 14 11 12   < > 13   CREATININE mg/dL 0.61* 0.61* 0.84   < > 1.09   GLUCOSE mg/dL 97 112* 127*   < > 117*   CALCIUM mg/dL 7.4* 7.0* 6.8*   < > 7.9*   ALT (SGPT) U/L  --   --   --   --  23   AST (SGOT) U/L  --   --   --   --  35    < > = values in this interval not displayed.     Estimated Creatinine Clearance: 105.4 mL/min (A) (by C-G formula based on SCr of 0.61 mg/dL (L)).    Microbiology Results Abnormal     Procedure Component Value - Date/Time    Blood Culture - Blood, Arm, Right [[id number]] Collected:  12/14/19 2330    Lab Status:  Preliminary result Specimen:  Blood from Arm, Right Updated:  12/17/19 0315     Blood Culture No growth at 2 days    Blood Culture - Blood, Hand, Right [[id number]] Collected:  12/14/19 2355    Lab Status:  Preliminary result Specimen:  Blood from Hand, Right Updated:  12/17/19 0315     Blood Culture No growth at 2 days    Blood Culture - Blood, Arm, Right [[id number]] Collected:  12/13/19 1315    Lab Status:  Preliminary result Specimen:  Blood from Arm, Right Updated:  12/16/19 1345     Blood Culture No growth at 3 days    Blood Culture - Blood, Arm, Right [[id number]]  (Abnormal) Collected:  12/13/19 1310    Lab Status:  Final result Specimen:  Blood from Arm, Right Updated:  12/15/19 1232     Blood Culture Staphylococcus, coagulase negative     Comment: Probable contaminant requires clinical correlation, susceptibility not performed unless requested by physician.          Isolated from Aerobic Bottle     Gram Stain Aerobic Bottle Gram positive cocci in clusters    Blood Culture ID, PCR - Blood, Arm, Right [[id number]]  (Abnormal) Collected:  12/13/19 5326     Lab Status:  Edited Result - FINAL Specimen:  Blood from Arm, Right Updated:  12/15/19 0621     BCID, PCR Staphylococcus spp, not aureus. Identification by BCID PCR.     Comment: Corrected result. Previous result was Staphylococcus aureus. mecA (methicillin resistance gene) detected. Identification by BCID PCR. on 12/14/2019 at 2118 EST.       Gastrointestinal Panel, PCR - Stool, Per Rectum [[id number]]  (Normal) Collected:  12/13/19 1545    Lab Status:  Final result Specimen:  Stool from Per Rectum Updated:  12/14/19 0044     Campylobacter Not Detected     Plesiomonas shigelloides Not Detected     Salmonella Not Detected     Vibrio Not Detected     Vibrio cholerae Not Detected     Yersinia enterocolitica Not Detected     Enteroaggregative E. coli (EAEC) Not Detected     Enteropathogenic E. coli (EPEC) Not Detected     Enterotoxigenic E. coli (ETEC) lt/st Not Detected     Shiga-like toxin-producing E. coli (STEC) stx1/stx2 Not Detected     E. coli O157 Not Detected     Shigella/Enteroinvasive E. coli (EIEC) Not Detected     Cryptosporidium Not Detected     Cyclospora cayetanensis Not Detected     Entamoeba histolytica Not Detected     Giardia lamblia Not Detected     Adenovirus F40/41 Not Detected     Astrovirus Not Detected     Norovirus GI/GII Not Detected     Rotavirus A Not Detected     Sapovirus (I, II, IV or V) Not Detected    Narrative:       If Aeromonas, Staphylococcus aureus or Bacillus cereus are suspected, please order [id number]: Stool Culture, Aeromonas, S aureus, B Cereus.    Clostridium Difficile Toxin - Stool, Per Rectum [[id number]] Collected:  12/13/19 1545    Lab Status:  Final result Specimen:  Stool from Per Rectum Updated:  12/13/19 6081    Narrative:       The following orders were created for panel order Clostridium Difficile Toxin - Stool, Per Rectum.  Procedure                               Abnormality         Status                     ---------                               -----------          ------                     Clostridium Difficile To...[[id number]]  Normal              Final result                 Please view results for these tests on the individual orders.    Clostridium Difficile Toxin, PCR - Stool, Per Rectum [[id number]]  (Normal) Collected:  12/13/19 1545    Lab Status:  Final result Specimen:  Stool from Per Rectum Updated:  12/13/19 1731     C. Difficile Toxins by PCR Not Detected    Narrative:       Performance characteristics of test not established for patients <2 years of age.  Negative for Toxigenic C. Difficile    Fecal Leukocytes - Stool, Per Rectum [[id number]]  (Abnormal) Collected:  12/13/19 1545    Lab Status:  Final result Specimen:  Stool from Per Rectum Updated:  12/13/19 1717     Fecal Leukocytes Many (4+) WBC's Seen          Imaging Results (Last 24 Hours)     ** No results found for the last 24 hours. **               I have reviewed the medications:  Scheduled Meds:  ferrous sulfate 650 mg Oral BID   folic acid 1 mg Oral Daily   methylPREDNISolone sodium succinate 40 mg Intravenous Q12H   pantoprazole 40 mg Oral Q AM   sodium chloride 10 mL Intravenous Q12H   sulfaSALAzine 1,000 mg Oral Q8H     Continuous Infusions:   PRN Meds:.•  acetaminophen **OR** acetaminophen **OR** acetaminophen  •  benzonatate  •  calcium gluconate IVPB **AND** calcium gluconate IVPB **AND** Calcium, Ionized  •  magnesium sulfate **OR** magnesium sulfate **OR** magnesium sulfate  •  ondansetron **OR** ondansetron  •  potassium chloride **OR** potassium chloride **OR** potassium chloride  •  sodium chloride  •  sodium chloride      Assessment/Plan   Assessment / Plan     Active Hospital Problems    Diagnosis  POA   • **Ulcerative colitis (CMS/HCC) [K51.90]  Unknown   • Severe chronic malnutrition (CMS/HCC) [E43]  Yes   • Hyponatremia [E87.1]  Unknown   • Lactic acidosis [E87.2]  Unknown   • Intractable diarrhea [R19.7]  Yes   • Unintentional weight loss [R63.4]  Yes   • Thrombocytosis (CMS/HCC)  [D47.3]  Yes   • Iron deficiency anemia [D50.9]  Yes      Resolved Hospital Problems   No resolved problems to display.        Brief Hospital Course to date:  Rafa Dan is a 49 y.o. male with a past medical history of prior tobacco abuse and recent diagnosis of ulcerative colitis in August 2019 who is admitted with intractable diarrhea and significant recent weight loss due to acute flare of ulcerative colitis.    Acute, severe ulcerative pancolitis with hemorrhage  Intractable diarrhea, CDT/GI panel negative. LVQ/Flagyl discontinued   Lactic Acidosis, due to volume depletion / malnutrition. Resolved   - continue IV steroids, sulfasalazine, folic acid   - Needs to remain in hospital until frequency of diarrhea lessens to the extent that he can maintain hydration and nutritional needs   - longterm plan for Humira  - Appreciate GI assistance   - Repeat labs daily    Acute on chronic blood loss anemia, stable  - s/p transfusion of 3 (?, unclear on review) units this admission. Hgb reviewed and stable     Acute severe protein calorie malnutrition with unintentional weight loss  -Continue nutritional supplements. Pt eating well     Thrombocytosis, resolved      Hyponatremia, mild and stable    False Positive Blood culture    DVT Prophylaxis:  SCDs given GI hemorrhage  Disposition: I expect the patient to be discharged home in 2-3 days pending improvement of diarrhea and ability to transition to oral steroids.    CODE STATUS:   Code Status and Medical Interventions:   Ordered at: 12/13/19 1521     Level Of Support Discussed With:    Patient     Code Status:    CPR     Medical Interventions (Level of Support Prior to Arrest):    Full         Electronically signed by Hilario Steinberg MD, 12/17/19, 11:53 AM.

## 2019-12-17 NOTE — PROGRESS NOTES
"GI Daily Progress Note  Subjective:    Chief Complaint: Follow-up severe ulcerative colitis.    Patient continues to have frequent bowel movements, both day and night.  There remains no form to the stool.  There are few blood clots in the stool.  Denies abdominal pain or nausea.  Eating well.    Objective:    BP 91/60 (BP Location: Right arm, Patient Position: Lying)   Pulse 113   Temp 98.2 °F (36.8 °C) (Oral)   Resp 18   Ht 182.9 cm (72\")   Wt 52 kg (114 lb 9.6 oz)   SpO2 91%   BMI 15.54 kg/m²     Physical Exam   Constitutional: He is oriented to person, place, and time. He appears well-developed and well-nourished. No distress.   HENT:   Head: Normocephalic.   Mouth/Throat: No oropharyngeal exudate.   Eyes: Conjunctivae are normal. No scleral icterus.   Neck: Normal range of motion.   Cardiovascular: Regular rhythm.   Mild tachycardia.   Pulmonary/Chest: Effort normal and breath sounds normal. No stridor. No respiratory distress. He has no wheezes. He has no rales.   Abdominal: Soft. Bowel sounds are normal. He exhibits no distension and no mass. There is no tenderness. There is no guarding.   Scaphoid   Genitourinary:   Genitourinary Comments: Deferred   Musculoskeletal: Normal range of motion. He exhibits no edema.   Sarcopenia   Neurological: He is alert and oriented to person, place, and time.   Skin: Skin is warm and dry. Capillary refill takes less than 2 seconds. No rash noted. No pallor.   Psychiatric: He has a normal mood and affect. His behavior is normal.   Nursing note and vitals reviewed.      Lab  Lab Results   Component Value Date    WBC 14.85 (H) 12/17/2019    HGB 9.7 (L) 12/17/2019    HGB 9.5 (L) 12/16/2019    HGB 9.2 (L) 12/15/2019    MCV 83.6 12/17/2019     12/17/2019    INR 1.28 (H) 12/13/2019       Lab Results   Component Value Date    GLUCOSE 97 12/17/2019    BUN 14 12/17/2019    CREATININE 0.61 (L) 12/17/2019    EGFRIFNONA 139 12/17/2019    BCR 23.0 12/17/2019     (L) " 12/17/2019    K 4.7 12/17/2019    CO2 25.0 12/17/2019    CALCIUM 7.4 (L) 12/17/2019    ALBUMIN 1.60 (L) 12/13/2019    ALKPHOS 152 (H) 12/13/2019    BILITOT 0.4 12/13/2019    ALT 23 12/13/2019    AST 35 12/13/2019       Assessment:    Severe pan-ulcerative colitis with hemorrhage.  Diarrhea slowed slightly.  Still with blood in stool.  Chronic blood loss anemia, stable after transfusion.  Non-volitional weight loss.  Severe protein calorie malnutrition.    Plan:    >> Continue nutritional supplements.  >> Continue corticosteroids.  >> Continue sulfasalazine and folic acid.  >> We will submit application for patient assistance for adalimumab.    Mark I. Brunner, MD  12/17/19  10:49 AM

## 2019-12-18 ENCOUNTER — TELEPHONE (OUTPATIENT)
Dept: GASTROENTEROLOGY | Facility: CLINIC | Age: 49
End: 2019-12-18

## 2019-12-18 LAB
ALBUMIN SERPL-MCNC: 1.9 G/DL (ref 3.5–5.2)
ALBUMIN/GLOB SERPL: 0.6 G/DL
ALP SERPL-CCNC: 199 U/L (ref 39–117)
ALT SERPL W P-5'-P-CCNC: 39 U/L (ref 1–41)
ANION GAP SERPL CALCULATED.3IONS-SCNC: 5 MMOL/L (ref 5–15)
AST SERPL-CCNC: 50 U/L (ref 1–40)
BACTERIA SPEC AEROBE CULT: NORMAL
BILIRUB SERPL-MCNC: 0.2 MG/DL (ref 0.2–1.2)
BUN BLD-MCNC: 15 MG/DL (ref 6–20)
BUN/CREAT SERPL: 27.8 (ref 7–25)
CALCIUM SPEC-SCNC: 7.5 MG/DL (ref 8.6–10.5)
CHLORIDE SERPL-SCNC: 102 MMOL/L (ref 98–107)
CO2 SERPL-SCNC: 27 MMOL/L (ref 22–29)
CREAT BLD-MCNC: 0.54 MG/DL (ref 0.76–1.27)
DEPRECATED RDW RBC AUTO: 56.4 FL (ref 37–54)
ERYTHROCYTE [DISTWIDTH] IN BLOOD BY AUTOMATED COUNT: 18.9 % (ref 12.3–15.4)
GFR SERPL CREATININE-BSD FRML MDRD: >150 ML/MIN/1.73
GLOBULIN UR ELPH-MCNC: 3 GM/DL
GLUCOSE BLD-MCNC: 93 MG/DL (ref 65–99)
HCT VFR BLD AUTO: 32.6 % (ref 37.5–51)
HGB BLD-MCNC: 9.8 G/DL (ref 13–17.7)
MAGNESIUM SERPL-MCNC: 1.8 MG/DL (ref 1.6–2.6)
MCH RBC QN AUTO: 26.1 PG (ref 26.6–33)
MCHC RBC AUTO-ENTMCNC: 30.1 G/DL (ref 31.5–35.7)
MCV RBC AUTO: 86.7 FL (ref 79–97)
PLATELET # BLD AUTO: 265 10*3/MM3 (ref 140–450)
PMV BLD AUTO: 9.3 FL (ref 6–12)
POTASSIUM BLD-SCNC: 4.8 MMOL/L (ref 3.5–5.2)
PROT SERPL-MCNC: 4.9 G/DL (ref 6–8.5)
RBC # BLD AUTO: 3.76 10*6/MM3 (ref 4.14–5.8)
SODIUM BLD-SCNC: 134 MMOL/L (ref 136–145)
WBC NRBC COR # BLD: 14.3 10*3/MM3 (ref 3.4–10.8)

## 2019-12-18 PROCEDURE — 25010000002 METHYLPREDNISOLONE PER 40 MG: Performed by: INTERNAL MEDICINE

## 2019-12-18 PROCEDURE — 99232 SBSQ HOSP IP/OBS MODERATE 35: CPT | Performed by: INTERNAL MEDICINE

## 2019-12-18 PROCEDURE — 99232 SBSQ HOSP IP/OBS MODERATE 35: CPT | Performed by: NURSE PRACTITIONER

## 2019-12-18 PROCEDURE — 83735 ASSAY OF MAGNESIUM: CPT | Performed by: HOSPITALIST

## 2019-12-18 PROCEDURE — 85027 COMPLETE CBC AUTOMATED: CPT | Performed by: HOSPITALIST

## 2019-12-18 PROCEDURE — 97116 GAIT TRAINING THERAPY: CPT

## 2019-12-18 PROCEDURE — 80053 COMPREHEN METABOLIC PANEL: CPT | Performed by: HOSPITALIST

## 2019-12-18 RX ADMIN — SULFASALAZINE 1000 MG: 500 TABLET ORAL at 15:16

## 2019-12-18 RX ADMIN — PANTOPRAZOLE SODIUM 40 MG: 40 TABLET, DELAYED RELEASE ORAL at 05:52

## 2019-12-18 RX ADMIN — METHYLPREDNISOLONE SODIUM SUCCINATE 40 MG: 40 INJECTION, POWDER, LYOPHILIZED, FOR SOLUTION INTRAMUSCULAR; INTRAVENOUS at 05:51

## 2019-12-18 RX ADMIN — FERROUS SULFATE TAB 325 MG (65 MG ELEMENTAL FE) 650 MG: 325 (65 FE) TAB at 21:23

## 2019-12-18 RX ADMIN — FOLIC ACID 1 MG: 1 TABLET ORAL at 07:45

## 2019-12-18 RX ADMIN — MAGNESIUM SULFATE HEPTAHYDRATE 4 G: 40 INJECTION, SOLUTION INTRAVENOUS at 11:57

## 2019-12-18 RX ADMIN — SULFASALAZINE 1000 MG: 500 TABLET ORAL at 21:23

## 2019-12-18 RX ADMIN — METHYLPREDNISOLONE SODIUM SUCCINATE 40 MG: 40 INJECTION, POWDER, LYOPHILIZED, FOR SOLUTION INTRAMUSCULAR; INTRAVENOUS at 17:17

## 2019-12-18 RX ADMIN — FERROUS SULFATE TAB 325 MG (65 MG ELEMENTAL FE) 650 MG: 325 (65 FE) TAB at 07:45

## 2019-12-18 RX ADMIN — SULFASALAZINE 1000 MG: 500 TABLET ORAL at 05:52

## 2019-12-18 NOTE — TELEPHONE ENCOUNTER
I called Tana Peck representative requesting [facility] sample. Patient is due in 2 weeks per Dr Arteaga[facility] I left Tana a message.

## 2019-12-18 NOTE — PROGRESS NOTES
[facility] Medicine Services  PROGRESS NOTE    Patient Name: Rafa Dan  : 1970  MRN: [id number]    Date of Admission: 2019  Primary Care Physician: Diego García APRN    Subjective   Subjective     CC:  Follow-up diarrhea    HPI:  Patient continues with diarrhea but he states it is improving. Diet has improved with steroids. Denies any abd pain. Up moving in room without difficulty. No acute events overnight per nursing.     Review of Systems  Gen- No fevers, chills  CV- No chest pain, palpitations  Resp- No cough, dyspnea  GI- As above    Objective   Objective     Vital Signs:   Temp:  [98 °F (36.7 °C)-98.3 °F (36.8 °C)] 98.3 °F (36.8 °C)  Heart Rate:  [102-113] 108  Resp:  [16-18] 16  BP: (103-115)/(59-80) 115/80     Physical Exam:  Constitutional: No acute distress, awake, alert, thin, chronically ill-appearing  HENT: NCAT, mucous membranes moist  Respiratory: Clear to auscultation bilaterally, respiratory effort normal room air   Cardiovascular: RRR, no murmurs, rubs, or gallops, palpable pedal pulses bilaterally  Gastrointestinal: Positive bowel sounds, soft, nontender, nondistended, no guarding or rebound   Musculoskeletal: No bilateral ankle edema  Psychiatric: Appropriate affect, cooperative  Neurologic: Strength symmetric in all extremities  Skin: numerous tattoos to extremities and trunk      Results Reviewed:    Results from last 7 days   Lab Units 19  0639 19  0615 19  0656  19  1315   WBC 10*3/mm3 14.30* 14.85* 10.91*   < > 7.61   HEMOGLOBIN g/dL 9.8* 9.7* 9.5*   < > 8.2*   HEMOGLOBIN, POC   --   --   --    < >  --    HEMATOCRIT % 32.6* 31.5* 31.1*   < > 28.6*   HEMATOCRIT POC   --   --   --    < >  --    PLATELETS 10*3/mm3 265 266 250   < > 547*   INR   --   --   --   --  1.28*   PROCALCITONIN ng/mL  --   --   --   --  0.54*    < > = values in this interval not displayed.     Results from last 7 days   Lab Units  12/18/19  0639 12/17/19  0615 12/16/19  0657  12/13/19  1315   SODIUM mmol/L 134* 133* 131*   < > 130*   POTASSIUM mmol/L 4.8 4.7 4.7   < > 4.2   CHLORIDE mmol/L 102 102 101   < > 95*   CO2 mmol/L 27.0 25.0 24.0   < > 19.0*   BUN mg/dL 15 14 11   < > 13   CREATININE mg/dL 0.54* 0.61* 0.61*   < > 1.09   GLUCOSE mg/dL 93 97 112*   < > 117*   CALCIUM mg/dL 7.5* 7.4* 7.0*   < > 7.9*   ALT (SGPT) U/L 39  --   --   --  23   AST (SGOT) U/L 50*  --   --   --  35    < > = values in this interval not displayed.     Estimated Creatinine Clearance: 119 mL/min (A) (by C-G formula based on SCr of 0.54 mg/dL (L)).    Microbiology Results Abnormal     Procedure Component Value - Date/Time    Blood Culture - Blood, Arm, Right [[id number]] Collected:  12/14/19 2330    Lab Status:  Preliminary result Specimen:  Blood from Arm, Right Updated:  12/18/19 0315     Blood Culture No growth at 3 days    Blood Culture - Blood, Hand, Right [[id number]] Collected:  12/14/19 2355    Lab Status:  Preliminary result Specimen:  Blood from Hand, Right Updated:  12/18/19 0315     Blood Culture No growth at 3 days    Blood Culture - Blood, Arm, Right [[id number]] Collected:  12/13/19 1315    Lab Status:  Preliminary result Specimen:  Blood from Arm, Right Updated:  12/17/19 1345     Blood Culture No growth at 4 days    Blood Culture - Blood, Arm, Right [[id number]]  (Abnormal) Collected:  12/13/19 1310    Lab Status:  Final result Specimen:  Blood from Arm, Right Updated:  12/15/19 1232     Blood Culture Staphylococcus, coagulase negative     Comment: Probable contaminant requires clinical correlation, susceptibility not performed unless requested by physician.          Isolated from Aerobic Bottle     Gram Stain Aerobic Bottle Gram positive cocci in clusters    Blood Culture ID, PCR - Blood, Arm, Right [[id number]]  (Abnormal) Collected:  12/13/19 1310    Lab Status:  Edited Result - FINAL Specimen:  Blood from Arm, Right Updated:  12/15/19 0621     BCID,  PCR Staphylococcus spp, not aureus. Identification by BCID PCR.     Comment: Corrected result. Previous result was Staphylococcus aureus. mecA (methicillin resistance gene) detected. Identification by BCID PCR. on 12/14/2019 at 2118 EST.       Gastrointestinal Panel, PCR - Stool, Per Rectum [[id number]]  (Normal) Collected:  12/13/19 1545    Lab Status:  Final result Specimen:  Stool from Per Rectum Updated:  12/14/19 0044     Campylobacter Not Detected     Plesiomonas shigelloides Not Detected     Salmonella Not Detected     Vibrio Not Detected     Vibrio cholerae Not Detected     Yersinia enterocolitica Not Detected     Enteroaggregative E. coli (EAEC) Not Detected     Enteropathogenic E. coli (EPEC) Not Detected     Enterotoxigenic E. coli (ETEC) lt/st Not Detected     Shiga-like toxin-producing E. coli (STEC) stx1/stx2 Not Detected     E. coli O157 Not Detected     Shigella/Enteroinvasive E. coli (EIEC) Not Detected     Cryptosporidium Not Detected     Cyclospora cayetanensis Not Detected     Entamoeba histolytica Not Detected     Giardia lamblia Not Detected     Adenovirus F40/41 Not Detected     Astrovirus Not Detected     Norovirus GI/GII Not Detected     Rotavirus A Not Detected     Sapovirus (I, II, IV or V) Not Detected    Narrative:       If Aeromonas, Staphylococcus aureus or Bacillus cereus are suspected, please order [id number]: Stool Culture, Aeromonas, S aureus, B Cereus.    Clostridium Difficile Toxin - Stool, Per Rectum [[id number]] Collected:  12/13/19 1545    Lab Status:  Final result Specimen:  Stool from Per Rectum Updated:  12/13/19 3323    Narrative:       The following orders were created for panel order Clostridium Difficile Toxin - Stool, Per Rectum.  Procedure                               Abnormality         Status                     ---------                               -----------         ------                     Clostridium Difficile To...[[id number]]  Normal              Final result                  Please view results for these tests on the individual orders.    Clostridium Difficile Toxin, PCR - Stool, Per Rectum [[id number]]  (Normal) Collected:  12/13/19 1545    Lab Status:  Final result Specimen:  Stool from Per Rectum Updated:  12/13/19 1731     C. Difficile Toxins by PCR Not Detected    Narrative:       Performance characteristics of test not established for patients <2 years of age.  Negative for Toxigenic C. Difficile    Fecal Leukocytes - Stool, Per Rectum [[id number]]  (Abnormal) Collected:  12/13/19 1545    Lab Status:  Final result Specimen:  Stool from Per Rectum Updated:  12/13/19 1717     Fecal Leukocytes Many (4+) WBC's Seen          Imaging Results (Last 24 Hours)     ** No results found for the last 24 hours. **               I have reviewed the medications:  Scheduled Meds:    ferrous sulfate 650 mg Oral BID   folic acid 1 mg Oral Daily   methylPREDNISolone sodium succinate 40 mg Intravenous Q12H   pantoprazole 40 mg Oral Q AM   sodium chloride 10 mL Intravenous Q12H   sulfaSALAzine 1,000 mg Oral Q8H     Continuous Infusions:   PRN Meds:.•  acetaminophen **OR** acetaminophen **OR** acetaminophen  •  benzonatate  •  calcium gluconate IVPB **AND** calcium gluconate IVPB **AND** Calcium, Ionized  •  magnesium sulfate **OR** magnesium sulfate **OR** magnesium sulfate  •  ondansetron **OR** ondansetron  •  potassium chloride **OR** potassium chloride **OR** potassium chloride  •  sodium chloride  •  sodium chloride      Assessment/Plan   Assessment / Plan     Active Hospital Problems    Diagnosis  POA   • **Ulcerative colitis (CMS/HCC) [K51.90]  Unknown   • Severe chronic malnutrition (CMS/HCC) [E43]  Yes   • Hyponatremia [E87.1]  Unknown   • Lactic acidosis [E87.2]  Unknown   • Intractable diarrhea [R19.7]  Yes   • Unintentional weight loss [R63.4]  Yes   • Thrombocytosis (CMS/HCC) [D47.3]  Yes   • Iron deficiency anemia [D50.9]  Yes      Resolved Hospital Problems   No resolved  problems to display.        Brief Hospital Course to date:  Rafa Dan is a 49 y.o. male with a past medical history of prior tobacco abuse and recent diagnosis of ulcerative colitis in August 2019 who is admitted with intractable diarrhea and significant recent weight loss due to acute flare of ulcerative colitis.    Acute, severe ulcerative pancolitis with hemorrhage  Intractable diarrhea, CDT/GI panel negative. LVQ/Flagyl discontinued   Lactic Acidosis, due to volume depletion / malnutrition. Resolved   - continue IV steroids, sulfasalazine, folic acid   - Needs to remain in hospital until frequency of diarrhea lessens to the extent that he can maintain hydration and nutritional needs   - longterm plan for Humira  - Appreciate GI assistance     Acute on chronic blood loss anemia, stable  - s/p transfusion of 3 (?, unclear on review) units this admission. Hgb reviewed and stable     Acute severe protein calorie malnutrition with unintentional weight loss  -Continue nutritional supplements. Pt eating well     Thrombocytosis, resolved      Hyponatremia, mild and stable    False Positive Blood culture    DVT Prophylaxis:  SCDs given GI hemorrhage  Disposition: I expect the patient to be discharged home in 2-3 days pending improvement of diarrhea and ability to transition to oral steroids.    CODE STATUS:   Code Status and Medical Interventions:   Ordered at: 12/13/19 1521     Level Of Support Discussed With:    Patient     Code Status:    CPR     Medical Interventions (Level of Support Prior to Arrest):    Full         Electronically signed by DANA Brown, 12/18/19, 9:34 AM.

## 2019-12-18 NOTE — PROGRESS NOTES
Continued Stay Note  [facility]     Patient Name: Rafa Dan  MRN: [id number]  Today's Date: 12/18/2019    Admit Date: 12/13/2019    Discharge Plan     Row Name 12/18/19 1618       Plan    Plan  Home    Patient/Family in Agreement with Plan  yes    Plan Comments  Spoke with patient. His plan remains to return home when medically ready for discharge. He denied discharge needs.    Final Discharge Disposition Code  01 - home or self-care        Discharge Codes    No documentation.             Wendy Rogers RN

## 2019-12-18 NOTE — PROGRESS NOTES
"GI Daily Progress Note  Subjective     Rafa Dan is a 49 y.o. male who was admitted with Ulcerative colitis (CMS/HCC).   Still with multiple stools.  Tolerating diet. Appetite is better on steroids    Chief Complaint:  diarrhea    Objective     /80 (BP Location: Right arm, Patient Position: Sitting)   Pulse 108   Temp 98.3 °F (36.8 °C) (Oral)   Resp 16   Ht 182.9 cm (72\")   Wt 52 kg (114 lb 9.6 oz)   SpO2 91%   BMI 15.54 kg/m²     Intake/Output last 3 shifts:  No intake/output data recorded.  Intake/Output this shift:  No intake/output data recorded.      Physical Exam  Wt Readings from Last 3 Encounters:   12/14/19 52 kg (114 lb 9.6 oz)   11/25/19 55.2 kg (121 lb 12.8 oz)   10/21/19 65.3 kg (144 lb)   ,body mass index is 15.54 kg/m².,@FLOWAMB(6)@,@FLOWAMB(5)@,@FLOWAMB(8)@   CONSTITUTIONAL:  Resp CTA; no rhonchi, rales, or wheezes.  Respiration effort normal  CV RRR; no M/R/G. No lower extremity edema  GI Abd soft, NT, ND, normal active bowel sounds.    Psych: Awake and alert    DATA:    Assessment/Plan     Severe pan-ulcerative colitis with hemorrhage.  Chronic blood loss anemia.  Non-volitional weight loss.  Severe protein calorie malnutrition.    Continue IV steroids till diarrhea is improving.  Working on patient assistance.  If we can obtain samples for second dose will give first dose while here. Hopefully today or tomorrow  Will get Vit D level        Ulcerative colitis (CMS/HCC)    Iron deficiency anemia    Thrombocytosis (CMS/HCC)    Unintentional weight loss    Intractable diarrhea    Hyponatremia    Lactic acidosis    Severe chronic malnutrition (CMS/HCC)       LOS: 5 days     Obinna Arteaga MD  12/18/19  9:08 AM  "

## 2019-12-18 NOTE — PLAN OF CARE
Problem: Patient Care Overview  Goal: Plan of Care Review  Outcome: Ongoing (interventions implemented as appropriate)  Flowsheets (Taken 12/18/2019 1004)  Outcome Summary: Patient continues to progress well towards PT goals. He transferred supine<>sit and STS independently. He increased ambulation distance to 400ft with SBA and no AD. He had multiple episiodes of mild unsteadiness; able to correct himself. Continue per PT POC to progress towards independence. Recommend assessing stairs next session.

## 2019-12-18 NOTE — THERAPY TREATMENT NOTE
Patient Name: Rafa Dan  : 1970    MRN: [id number]                              Today's Date: 2019       Admit Date: 2019    Visit Dx:     ICD-10-CM ICD-9-CM   1. Hypotension due to hypovolemia I95.89 458.8    E86.1 276.52   2. Dehydration E86.0 276.51   3. Ulcerative colitis with rectal bleeding, unspecified location (CMS/HCC) K51.911 556.9     Patient Active Problem List   Diagnosis   • Ulcerative colitis (CMS/HCC)   • Hypoalbuminemia   • Iron deficiency anemia   • Thrombocytosis (CMS/HCC)   • Unintentional weight loss   • Intractable diarrhea   • Severe protein-calorie malnutrition (CMS/HCC)   • Weight loss   • Diarrhea   • Iron deficiency anemia due to chronic blood loss   • Hyponatremia   • Lactic acidosis   • Severe chronic malnutrition (CMS/HCC)     Past Medical History:   Diagnosis Date   • Colitis    • Ulcerative colitis (CMS/HCC)      Past Surgical History:   Procedure Laterality Date   • COLONOSCOPY N/A 10/7/2019    Procedure: COLONOSCOPY;  Surgeon: Brunner, Mark I, MD;  Location: [facility] ENDOSCOPY;  Service: Gastroenterology     General Information     Row Name 19 1004          PT Evaluation Time/Intention    Document Type  therapy note (daily note)  -NS     Mode of Treatment  individual therapy;physical therapy  -NS     Row Name 19 1004          General Information    Patient Profile Reviewed?  yes  -NS     Row Name 19 1004          Cognitive Assessment/Intervention- PT/OT    Orientation Status (Cognition)  oriented x 4  -NS     Row Name 19 1004          Safety Issues, Functional Mobility    Safety Issues Affecting Function (Mobility)  impulsivity;insight into deficits/self awareness;safety precaution awareness;safety precautions follow-through/compliance  -NS     Impairments Affecting Function (Mobility)  balance;endurance/activity tolerance;strength  -NS       User Key  (r) = Recorded By, (t) = Taken By, (c) = Cosigned By    Initials Name Provider  Type    Jazmín Gold, PT Physical Therapist        Mobility     Row Name 12/18/19 1004          Bed Mobility Assessment/Treatment    Bed Mobility Assessment/Treatment  supine-sit;sit-supine  -NS     Supine-Sit Bland (Bed Mobility)  independent  -NS     Sit-Supine Bland (Bed Mobility)  independent  -NS     Assistive Device (Bed Mobility)  head of bed elevated  -NS     Row Name 12/18/19 1004          Transfer Assessment/Treatment    Comment (Transfers)  VCing for completing slowly and waiting for PT prior to initiating next task.  -NS     Row Name 12/18/19 1004          Sit-Stand Transfer    Sit-Stand Bland (Transfers)  independent  -NS     Row Name 12/18/19 1004          Gait/Stairs Assessment/Training    Gait/Stairs Assessment/Training  gait/ambulation independence  -NS     Bland Level (Gait)  stand by assist  -NS     Distance in Feet (Gait)  400ft  -NS     Pattern (Gait)  step-through  -NS     Deviations/Abnormal Patterns (Gait)  base of support, narrow;mel decreased;gait speed decreased  -NS     Bilateral Gait Deviations  forward flexed posture;heel strike decreased  -NS     Comment (Gait/Stairs)  Patient ambulated in hallway without AD with many moments of mild unsteadiness in which pt was able to correct without assistance from PT. Patient occasionally took 2-3 steps in a row with excessively increased step length but quickly returned to normal step length. No episodes of LOB or dizziness.  -NS       User Key  (r) = Recorded By, (t) = Taken By, (c) = Cosigned By    Initials Name Provider Type    Jazmín Gold PT Physical Therapist        Obj/Interventions     Row Name 12/18/19 1004          Therapeutic Exercise    Lower Extremity (Therapeutic Exercise)  LAQ (long arc quad), bilateral;marching while seated;marching while standing;other (see comments) sit to stands  -NS     Lower Extremity Range of Motion (Therapeutic Exercise)  ankle dorsiflexion/plantar flexion, bilateral   -NS     Position (Therapeutic Exercise)  seated;standing  -NS     Sets/Reps (Therapeutic Exercise)  1x10 ea  -NS     Expected Outcome (Therapeutic Exercise)  facilitate normal movement patterns;improve neuromuscular control;improve performance, gait skills  -NS     Comment (Therapeutic Exercise)  Patient cued for completed at appropriate speed; pt tends to complete ther ex quickly.  -NS     Row Name 12/18/19 1004          Static Sitting Balance    Level of [place] (Unsupported Sitting, Static Balance)  independent  -NS     Sitting Position (Unsupported Sitting, Static Balance)  sitting on edge of bed  -NS     Time Able to Maintain Position (Unsupported Sitting, Static Balance)  30 to 45 seconds  -NS     Row Name 12/18/19 1004          Dynamic Sitting Balance    Level of [place], Reaches Outside Midline (Sitting, Dynamic Balance)  independent  -NS     Sitting Position, Reaches Outside Midline (Sitting, Dynamic Balance)  sitting on edge of bed  -NS     Row Name 12/18/19 1004          Static Standing Balance    Level of [place] (Supported Standing, Static Balance)  independent  -NS     Time Able to Maintain Position (Supported Standing, Static Balance)  15 to 30 seconds  -NS     Row Name 12/18/19 1004          Dynamic Standing Balance    Level of [place], Reaches Outside Midline (Standing, Dynamic Balance)  standby assist  -NS     Time Able to Maintain Position, Reaches Outside Midline (Standing, Dynamic Balance)  30 to 45 seconds  -NS       User Key  (r) = Recorded By, (t) = Taken By, (c) = Cosigned By    Initials Name Provider Type    Jazmín Gold PT Physical Therapist        Goals/Plan    No documentation.       Clinical Impression     Row Name 12/18/19 1004          Pain Assessment    Additional Documentation  Pain Scale: Numbers Pre/Post-Treatment (Group)  -NS     Row Name 12/18/19 1004          Pain Scale: Numbers Pre/Post-Treatment    Pain Scale: Numbers, Pretreatment  0/10 - no pain   -NS     Pain Scale: Numbers, Post-Treatment  0/10 - no pain  -NS     Row Name 12/18/19 1004          Plan of Care Review    Plan of Care Reviewed With  patient  -NS     Progress  improving  -NS     Row Name 12/18/19 1004          Vital Signs    Pre Systolic BP Rehab  -- VSS- RN cleared for PT treatment  -NS     Pre Patient Position  Supine  -NS     Intra Patient Position  Standing  -NS     Post Patient Position  Sitting  -NS     Row Name 12/18/19 1004          Positioning and Restraints    Pre-Treatment Position  in bed  -NS     Post Treatment Position  bed  -NS     In Bed  sitting EOB;call light within reach;encouraged to call for assist  -NS       User Key  (r) = Recorded By, (t) = Taken By, (c) = Cosigned By    Initials Name Provider Type    Jazmín Gold PT Physical Therapist        Outcome Measures     Row Name 12/18/19 1004          How much help from another person do you currently need...    Turning from your back to your side while in flat bed without using bedrails?  4  -NS     Moving from lying on back to sitting on the side of a flat bed without bedrails?  4  -NS     Moving to and from a bed to a chair (including a wheelchair)?  4  -NS     Standing up from a chair using your arms (e.g., wheelchair, bedside chair)?  4  -NS     Climbing 3-5 steps with a railing?  3  -NS     To walk in hospital room?  3  -NS     AM-PAC 6 Clicks Score (PT)  22  -NS     Row Name 12/18/19 1004          Functional Assessment    Outcome Measure Options  AM-PAC 6 Clicks Basic Mobility (PT)  -NS       User Key  (r) = Recorded By, (t) = Taken By, (c) = Cosigned By    Initials Name Provider Type    Jazmín Gold, PAKO Physical Therapist          PT Recommendation and Plan     Plan of Care Reviewed With: patient  Progress: improving  Outcome Summary: Patient continues to progress well towards PT goals. He transferred supine<>sit and STS independently. He increased ambulation distance to 400ft with SBA and no AD. He had multiple  episiodes of mild unsteadiness; able to correct himself. Continue per PT POC to progress towards independence. Recommend assessing stairs next session.     Time Calculation:   PT Charges     Row Name 12/18/19 1004             Time Calculation    Start Time  1004  -NS      PT Received On  12/18/19  -NS      PT Goal Re-Cert Due Date  12/25/19  -NS         Timed Charges    52183 - PT Therapeutic Exercise Minutes  6  -NS      95336 - Gait Training Minutes   8  -NS        User Key  (r) = Recorded By, (t) = Taken By, (c) = Cosigned By    Initials Name Provider Type    Jazmín Gold, PT Physical Therapist        Therapy Charges for Today     Code Description Service Date Service Provider Modifiers Qty    [id number] HC GAIT TRAINING EA 15 MIN 12/18/2019 Jazmín Winters, PT GP 1          PT G-Codes  Outcome Measure Options: AM-PAC 6 Clicks Basic Mobility (PT)  AM-PAC 6 Clicks Score (PT): 22    Jazmín Winters PT  12/18/2019

## 2019-12-19 LAB
25(OH)D3 SERPL-MCNC: 24.6 NG/ML (ref 30–100)
MAGNESIUM SERPL-MCNC: 1.9 MG/DL (ref 1.6–2.6)

## 2019-12-19 PROCEDURE — 82306 VITAMIN D 25 HYDROXY: CPT | Performed by: INTERNAL MEDICINE

## 2019-12-19 PROCEDURE — 83735 ASSAY OF MAGNESIUM: CPT | Performed by: NURSE PRACTITIONER

## 2019-12-19 PROCEDURE — 25010000002 METHYLPREDNISOLONE PER 40 MG: Performed by: INTERNAL MEDICINE

## 2019-12-19 PROCEDURE — 25010000002 FUROSEMIDE PER 20 MG: Performed by: INTERNAL MEDICINE

## 2019-12-19 PROCEDURE — 99232 SBSQ HOSP IP/OBS MODERATE 35: CPT | Performed by: NURSE PRACTITIONER

## 2019-12-19 PROCEDURE — 99232 SBSQ HOSP IP/OBS MODERATE 35: CPT | Performed by: INTERNAL MEDICINE

## 2019-12-19 RX ORDER — FUROSEMIDE 10 MG/ML
20 INJECTION INTRAMUSCULAR; INTRAVENOUS ONCE
Status: COMPLETED | OUTPATIENT
Start: 2019-12-19 | End: 2019-12-19

## 2019-12-19 RX ORDER — MELATONIN
1000 DAILY
Status: DISCONTINUED | OUTPATIENT
Start: 2019-12-19 | End: 2019-12-22 | Stop reason: HOSPADM

## 2019-12-19 RX ADMIN — ADALIMUMAB 160 MG: KIT at 15:26

## 2019-12-19 RX ADMIN — SULFASALAZINE 1000 MG: 500 TABLET ORAL at 20:58

## 2019-12-19 RX ADMIN — VITAMIN D, TAB 1000IU (100/BT) 1000 UNITS: 25 TAB at 14:07

## 2019-12-19 RX ADMIN — SULFASALAZINE 1000 MG: 500 TABLET ORAL at 07:52

## 2019-12-19 RX ADMIN — FERROUS SULFATE TAB 325 MG (65 MG ELEMENTAL FE) 650 MG: 325 (65 FE) TAB at 08:40

## 2019-12-19 RX ADMIN — METHYLPREDNISOLONE SODIUM SUCCINATE 40 MG: 40 INJECTION, POWDER, LYOPHILIZED, FOR SOLUTION INTRAMUSCULAR; INTRAVENOUS at 17:41

## 2019-12-19 RX ADMIN — METHYLPREDNISOLONE SODIUM SUCCINATE 40 MG: 40 INJECTION, POWDER, LYOPHILIZED, FOR SOLUTION INTRAMUSCULAR; INTRAVENOUS at 07:53

## 2019-12-19 RX ADMIN — PANTOPRAZOLE SODIUM 40 MG: 40 TABLET, DELAYED RELEASE ORAL at 07:53

## 2019-12-19 RX ADMIN — SULFASALAZINE 1000 MG: 500 TABLET ORAL at 15:35

## 2019-12-19 RX ADMIN — MAGNESIUM SULFATE HEPTAHYDRATE 4 G: 40 INJECTION, SOLUTION INTRAVENOUS at 17:41

## 2019-12-19 RX ADMIN — FUROSEMIDE 20 MG: 10 INJECTION, SOLUTION INTRAMUSCULAR; INTRAVENOUS at 14:09

## 2019-12-19 RX ADMIN — FERROUS SULFATE TAB 325 MG (65 MG ELEMENTAL FE) 650 MG: 325 (65 FE) TAB at 20:58

## 2019-12-19 RX ADMIN — FOLIC ACID 1 MG: 1 TABLET ORAL at 08:40

## 2019-12-19 NOTE — PROGRESS NOTES
"GI Daily Progress Note  Subjective     Laverne Dan is a 49 y.o. male who was admitted with Ulcerative colitis (CMS/[facility]).   Had 14 stools yesterday.  Less blood.    Chief Complaint:  diarrhea    Objective     /66 (BP Location: Right arm, Patient Position: Lying)   Pulse 98   Temp 97.7 °F (36.5 °C) (Axillary)   Resp 16   Ht 182.9 cm (72\")   Wt 52 kg (114 lb 9.6 oz)   SpO2 91%   BMI 15.54 kg/m²     Intake/Output last 3 shifts:  I/O last 3 completed shifts:  In: 240 [P.O.:240]  Out: -   Intake/Output this shift:  No intake/output data recorded.      Physical Exam  Wt Readings from Last 3 Encounters:   12/14/19 52 kg (114 lb 9.6 oz)   11/25/19 55.2 kg (121 lb 12.8 oz)   10/21/19 65.3 kg (144 lb)   ,body mass index is 15.54 kg/m².,@FLOWAMB(6)@,@FLOWAMB(5)@,@FLOWAMB(8)@   CONSTITUTIONAL:  Resp CTA; no rhonchi, rales, or wheezes.  Respiration effort normal  CV RRR; no M/R/G. No lower extremity edema  GI Abd soft, NT, ND, normal active bowel sounds.    Psych: Awake and alert    DATA:Results for LAVERNE DAN (MRN [id number]) as of 12/19/2019 13:22   Ref. Range 12/19/2019 04:56 12/19/2019 10:36   Magnesium Latest Ref Range: 1.6 - 2.6 mg/dL  1.9   25 Hydroxy, Vitamin D Latest Ref Range: 30.0 - 100.0 ng/ml 24.6 (L)        Assessment/Plan       Severe pan-ulcerative colitis with hemorrhage.  Chronic blood loss anemia.  Non-volitional weight loss.  Severe protein calorie malnutrition      Will begin Humira today.  Will need 80 mg in 2 weeks  FU Tawny Whitten in 2 weeks  Start Vit D replacement  Have arranged samples for 2 weeks until he  gets approval            Ulcerative colitis (CMS/HCC)    Iron deficiency anemia    Thrombocytosis (CMS/[facility])    Unintentional weight loss    Intractable diarrhea    Hyponatremia    Lactic acidosis    Severe chronic malnutrition (CMS/HCC)       LOS: 6 days     Obinna Arteaga MD  12/19/19  1:23 PM  "

## 2019-12-19 NOTE — PLAN OF CARE
Patients Magnesium is still low so he will continue to receive supplemental electrolytes. He did begin Humira which is promising for him. We also added Protein premier which has 30 grams of protein for his malnutrition. He is eating 100% of all meals. He did complain of feeling  full of fluid-something that happened to him prior when he got steroids. He was weighed and it reflected 131 lbs- admission weight was 114 lbs. Gastroenterologists was at bedside at the time and ordered 20 mg of iv Lasix.New IV as well today. #20 right AC. Patient showered as well. Adequate urine output today.

## 2019-12-19 NOTE — CONSULTS
lmt will see pt tomorrow Fri 12/20/19 for massage therapy, Thanks.  10:01 12/20 pt declined massage therapy. Thanks.

## 2019-12-19 NOTE — PROGRESS NOTES
[facility] Medicine Services  PROGRESS NOTE    Patient Name: Rafa Dan  : 1970  MRN: [id number]    Date of Admission: 2019  Primary Care Physician: Diego García APRN    Subjective   Subjective     CC:  Follow-up diarrhea    HPI:  Patient continues with diarrhea but he states it is improving in frequency and amount. Diet has improved with steroids. Denies any abd pain. Up moving in room without difficulty. No acute events overnight per nursing.     Review of Systems  Gen- No fevers, chills  CV- No chest pain, palpitations  Resp- No cough, dyspnea  GI- As above    Objective   Objective     Vital Signs:   Temp:  [97.7 °F (36.5 °C)-98.3 °F (36.8 °C)] 97.7 °F (36.5 °C)  Heart Rate:  [92-98] 98  Resp:  [16] 16  BP: (107-108)/(66-71) 108/66     Physical Exam:  Constitutional: No acute distress, awake, alert, thin, chronically ill-appearing  HENT: NCAT, mucous membranes moist  Respiratory: Clear to auscultation bilaterally, respiratory effort normal room air   Cardiovascular: RRR, no murmurs, rubs, or gallops, palpable pedal pulses bilaterally  Gastrointestinal: Positive bowel sounds, soft, nontender, nondistended, no guarding or rebound   Musculoskeletal: No bilateral ankle edema  Psychiatric: Appropriate affect, cooperative  Neurologic: Strength symmetric in all extremities  Skin: numerous tattoos to extremities and trunk      Results Reviewed:    Results from last 7 days   Lab Units 19  0639 19  0615 19  0656  19  1315   WBC 10*3/mm3 14.30* 14.85* 10.91*   < > 7.61   HEMOGLOBIN g/dL 9.8* 9.7* 9.5*   < > 8.2*   HEMOGLOBIN, POC   --   --   --    < >  --    HEMATOCRIT % 32.6* 31.5* 31.1*   < > 28.6*   HEMATOCRIT POC   --   --   --    < >  --    PLATELETS 10*3/mm3 265 266 250   < > 547*   INR   --   --   --   --  1.28*   PROCALCITONIN ng/mL  --   --   --   --  0.54*    < > = values in this interval not displayed.     Results from last 7 days    Lab Units 12/18/19  0639 12/17/19  0615 12/16/19  0657  12/13/19  1315   SODIUM mmol/L 134* 133* 131*   < > 130*   POTASSIUM mmol/L 4.8 4.7 4.7   < > 4.2   CHLORIDE mmol/L 102 102 101   < > 95*   CO2 mmol/L 27.0 25.0 24.0   < > 19.0*   BUN mg/dL 15 14 11   < > 13   CREATININE mg/dL 0.54* 0.61* 0.61*   < > 1.09   GLUCOSE mg/dL 93 97 112*   < > 117*   CALCIUM mg/dL 7.5* 7.4* 7.0*   < > 7.9*   ALT (SGPT) U/L 39  --   --   --  23   AST (SGOT) U/L 50*  --   --   --  35    < > = values in this interval not displayed.     Estimated Creatinine Clearance: 119 mL/min (A) (by C-G formula based on SCr of 0.54 mg/dL (L)).    Microbiology Results Abnormal     Procedure Component Value - Date/Time    Blood Culture - Blood, Arm, Right [[id number]] Collected:  12/14/19 2330    Lab Status:  Preliminary result Specimen:  Blood from Arm, Right Updated:  12/19/19 0316     Blood Culture No growth at 4 days    Blood Culture - Blood, Hand, Right [[id number]] Collected:  12/14/19 2355    Lab Status:  Preliminary result Specimen:  Blood from Hand, Right Updated:  12/19/19 0316     Blood Culture No growth at 4 days    Blood Culture - Blood, Arm, Right [[id number]] Collected:  12/13/19 1315    Lab Status:  Final result Specimen:  Blood from Arm, Right Updated:  12/18/19 1345     Blood Culture No growth at 5 days    Blood Culture - Blood, Arm, Right [[id number]]  (Abnormal) Collected:  12/13/19 1310    Lab Status:  Final result Specimen:  Blood from Arm, Right Updated:  12/15/19 1232     Blood Culture Staphylococcus, coagulase negative     Comment: Probable contaminant requires clinical correlation, susceptibility not performed unless requested by physician.          Isolated from Aerobic Bottle     Gram Stain Aerobic Bottle Gram positive cocci in clusters    Blood Culture ID, PCR - Blood, Arm, Right [[id number]]  (Abnormal) Collected:  12/13/19 1310    Lab Status:  Edited Result - FINAL Specimen:  Blood from Arm, Right Updated:  12/15/19 0645      BCID, PCR Staphylococcus spp, not aureus. Identification by BCID PCR.     Comment: Corrected result. Previous result was Staphylococcus aureus. mecA (methicillin resistance gene) detected. Identification by BCID PCR. on 12/14/2019 at 2118 EST.       Gastrointestinal Panel, PCR - Stool, Per Rectum [[id number]]  (Normal) Collected:  12/13/19 1545    Lab Status:  Final result Specimen:  Stool from Per Rectum Updated:  12/14/19 0044     Campylobacter Not Detected     Plesiomonas shigelloides Not Detected     Salmonella Not Detected     Vibrio Not Detected     Vibrio cholerae Not Detected     Yersinia enterocolitica Not Detected     Enteroaggregative E. coli (EAEC) Not Detected     Enteropathogenic E. coli (EPEC) Not Detected     Enterotoxigenic E. coli (ETEC) lt/st Not Detected     Shiga-like toxin-producing E. coli (STEC) stx1/stx2 Not Detected     E. coli O157 Not Detected     Shigella/Enteroinvasive E. coli (EIEC) Not Detected     Cryptosporidium Not Detected     Cyclospora cayetanensis Not Detected     Entamoeba histolytica Not Detected     Giardia lamblia Not Detected     Adenovirus F40/41 Not Detected     Astrovirus Not Detected     Norovirus GI/GII Not Detected     Rotavirus A Not Detected     Sapovirus (I, II, IV or V) Not Detected    Narrative:       If Aeromonas, Staphylococcus aureus or Bacillus cereus are suspected, please order [id number]: Stool Culture, Aeromonas, S aureus, B Cereus.    Clostridium Difficile Toxin - Stool, Per Rectum [[id number]] Collected:  12/13/19 1545    Lab Status:  Final result Specimen:  Stool from Per Rectum Updated:  12/13/19 8958    Narrative:       The following orders were created for panel order Clostridium Difficile Toxin - Stool, Per Rectum.  Procedure                               Abnormality         Status                     ---------                               -----------         ------                     Clostridium Difficile To...[[id number]]  Normal              Final  result                 Please view results for these tests on the individual orders.    Clostridium Difficile Toxin, PCR - Stool, Per Rectum [[id number]]  (Normal) Collected:  12/13/19 1545    Lab Status:  Final result Specimen:  Stool from Per Rectum Updated:  12/13/19 1731     C. Difficile Toxins by PCR Not Detected    Narrative:       Performance characteristics of test not established for patients <2 years of age.  Negative for Toxigenic C. Difficile    Fecal Leukocytes - Stool, Per Rectum [[id number]]  (Abnormal) Collected:  12/13/19 1545    Lab Status:  Final result Specimen:  Stool from Per Rectum Updated:  12/13/19 1717     Fecal Leukocytes Many (4+) WBC's Seen          Imaging Results (Last 24 Hours)     ** No results found for the last 24 hours. **               I have reviewed the medications:  Scheduled Meds:    ferrous sulfate 650 mg Oral BID   folic acid 1 mg Oral Daily   methylPREDNISolone sodium succinate 40 mg Intravenous Q12H   pantoprazole 40 mg Oral Q AM   sodium chloride 10 mL Intravenous Q12H   sulfaSALAzine 1,000 mg Oral Q8H     Continuous Infusions:   PRN Meds:.•  acetaminophen **OR** acetaminophen **OR** acetaminophen  •  benzonatate  •  calcium gluconate IVPB **AND** calcium gluconate IVPB **AND** Calcium, Ionized  •  magnesium sulfate **OR** magnesium sulfate **OR** magnesium sulfate  •  ondansetron **OR** ondansetron  •  potassium chloride **OR** potassium chloride **OR** potassium chloride  •  sodium chloride  •  sodium chloride      Assessment/Plan   Assessment / Plan     Active Hospital Problems    Diagnosis  POA   • **Ulcerative colitis (CMS/HCC) [K51.90]  Unknown   • Severe chronic malnutrition (CMS/HCC) [E43]  Yes   • Hyponatremia [E87.1]  Unknown   • Lactic acidosis [E87.2]  Unknown   • Intractable diarrhea [R19.7]  Yes   • Unintentional weight loss [R63.4]  Yes   • Thrombocytosis (CMS/HCC) [D47.3]  Yes   • Iron deficiency anemia [D50.9]  Yes      Resolved Hospital Problems   No  resolved problems to display.        Brief Hospital Course to date:  Rafa Dan is a 49 y.o. male with a past medical history of prior tobacco abuse and recent diagnosis of ulcerative colitis in August 2019 who is admitted with intractable diarrhea and significant recent weight loss due to acute flare of ulcerative colitis.    Acute, severe ulcerative pancolitis with hemorrhage  Intractable diarrhea, CDT/GI panel negative. LVQ/Flagyl discontinued   Lactic Acidosis, due to volume depletion / malnutrition. Resolved   - continue IV steroids, sulfasalazine, folic acid   - Needs to remain in hospital until frequency of diarrhea lessens to the extent that he can maintain hydration and nutritional needs   - longterm plan for Humira Gi submitted application   - Appreciate GI assistance     Acute on chronic blood loss anemia, stable  - s/p transfusion of 3 (?, unclear on review) units this admission. Hgb reviewed and stable     Acute severe protein calorie malnutrition with unintentional weight loss  -Continue nutritional supplements. Pt eating well     Thrombocytosis, resolved      Hyponatremia, mild and stable    False Positive Blood culture    DVT Prophylaxis:  SCDs given GI hemorrhage  Disposition: I expect the patient to be discharged home in 1-2 days pending improvement of diarrhea and ability to transition to oral steroids.    CODE STATUS:   Code Status and Medical Interventions:   Ordered at: 12/13/19 1521     Level Of Support Discussed With:    Patient     Code Status:    CPR     Medical Interventions (Level of Support Prior to Arrest):    Full         Electronically signed by DANA Brown, 12/19/19, 10:28 AM.

## 2019-12-20 LAB
ANION GAP SERPL CALCULATED.3IONS-SCNC: 9 MMOL/L (ref 5–15)
BACTERIA SPEC AEROBE CULT: NORMAL
BACTERIA SPEC AEROBE CULT: NORMAL
BUN BLD-MCNC: 21 MG/DL (ref 6–20)
BUN/CREAT SERPL: 43.8 (ref 7–25)
CALCIUM SPEC-SCNC: 6.9 MG/DL (ref 8.6–10.5)
CHLORIDE SERPL-SCNC: 102 MMOL/L (ref 98–107)
CO2 SERPL-SCNC: 23 MMOL/L (ref 22–29)
CREAT BLD-MCNC: 0.48 MG/DL (ref 0.76–1.27)
DEPRECATED RDW RBC AUTO: 60.9 FL (ref 37–54)
ERYTHROCYTE [DISTWIDTH] IN BLOOD BY AUTOMATED COUNT: 19.9 % (ref 12.3–15.4)
GFR SERPL CREATININE-BSD FRML MDRD: >150 ML/MIN/1.73
GLUCOSE BLD-MCNC: 89 MG/DL (ref 65–99)
HCT VFR BLD AUTO: 30 % (ref 37.5–51)
HGB BLD-MCNC: 9.1 G/DL (ref 13–17.7)
MAGNESIUM SERPL-MCNC: 2.4 MG/DL (ref 1.6–2.6)
MCH RBC QN AUTO: 26.8 PG (ref 26.6–33)
MCHC RBC AUTO-ENTMCNC: 30.3 G/DL (ref 31.5–35.7)
MCV RBC AUTO: 88.2 FL (ref 79–97)
PLATELET # BLD AUTO: 291 10*3/MM3 (ref 140–450)
PMV BLD AUTO: 9.3 FL (ref 6–12)
POTASSIUM BLD-SCNC: 3.9 MMOL/L (ref 3.5–5.2)
RBC # BLD AUTO: 3.4 10*6/MM3 (ref 4.14–5.8)
SODIUM BLD-SCNC: 134 MMOL/L (ref 136–145)
WBC NRBC COR # BLD: 12.79 10*3/MM3 (ref 3.4–10.8)

## 2019-12-20 PROCEDURE — 99232 SBSQ HOSP IP/OBS MODERATE 35: CPT | Performed by: PHYSICIAN ASSISTANT

## 2019-12-20 PROCEDURE — 83735 ASSAY OF MAGNESIUM: CPT | Performed by: INTERNAL MEDICINE

## 2019-12-20 PROCEDURE — 80048 BASIC METABOLIC PNL TOTAL CA: CPT | Performed by: NURSE PRACTITIONER

## 2019-12-20 PROCEDURE — 99232 SBSQ HOSP IP/OBS MODERATE 35: CPT | Performed by: HOSPITALIST

## 2019-12-20 PROCEDURE — 85027 COMPLETE CBC AUTOMATED: CPT | Performed by: NURSE PRACTITIONER

## 2019-12-20 PROCEDURE — 25010000002 METHYLPREDNISOLONE PER 40 MG: Performed by: INTERNAL MEDICINE

## 2019-12-20 RX ORDER — PREDNISONE 20 MG/1
40 TABLET ORAL
Status: DISCONTINUED | OUTPATIENT
Start: 2019-12-21 | End: 2019-12-22 | Stop reason: HOSPADM

## 2019-12-20 RX ADMIN — METHYLPREDNISOLONE SODIUM SUCCINATE 40 MG: 40 INJECTION, POWDER, LYOPHILIZED, FOR SOLUTION INTRAMUSCULAR; INTRAVENOUS at 05:36

## 2019-12-20 RX ADMIN — SODIUM CHLORIDE, PRESERVATIVE FREE 10 ML: 5 INJECTION INTRAVENOUS at 08:39

## 2019-12-20 RX ADMIN — PANTOPRAZOLE SODIUM 40 MG: 40 TABLET, DELAYED RELEASE ORAL at 05:36

## 2019-12-20 RX ADMIN — VITAMIN D, TAB 1000IU (100/BT) 1000 UNITS: 25 TAB at 08:38

## 2019-12-20 RX ADMIN — FERROUS SULFATE TAB 325 MG (65 MG ELEMENTAL FE) 650 MG: 325 (65 FE) TAB at 08:38

## 2019-12-20 RX ADMIN — SODIUM CHLORIDE, PRESERVATIVE FREE 10 ML: 5 INJECTION INTRAVENOUS at 21:14

## 2019-12-20 RX ADMIN — SULFASALAZINE 1000 MG: 500 TABLET ORAL at 21:14

## 2019-12-20 RX ADMIN — FERROUS SULFATE TAB 325 MG (65 MG ELEMENTAL FE) 650 MG: 325 (65 FE) TAB at 21:14

## 2019-12-20 RX ADMIN — FOLIC ACID 1 MG: 1 TABLET ORAL at 08:38

## 2019-12-20 RX ADMIN — SULFASALAZINE 1000 MG: 500 TABLET ORAL at 05:36

## 2019-12-20 RX ADMIN — SULFASALAZINE 1000 MG: 500 TABLET ORAL at 15:36

## 2019-12-20 NOTE — PROGRESS NOTES
[facility] Medicine Services  PROGRESS NOTE    Patient Name: Rafa Dan  : 1970  MRN: [id number]    Date of Admission: 2019  Primary Care Physician: Diego García APRN    Subjective   Subjective     CC:  Follow-up diarrhea    HPI:  Pt is having some improvement today with three, pudding consistency stools so far today. No abdominal pain, nausea, vomiting, hematochezia, fevers, or chills. Eating well and ambulating in halls.     Review of Systems  Gen- No fevers, chills  Resp- No cough, dyspnea  GI-as above       Objective   Objective     Vital Signs:   Temp:  [98.2 °F (36.8 °C)] 98.2 °F (36.8 °C)  Heart Rate:  [94] 94  Resp:  [16-18] 18  BP: ()/(53-69) 91/53     Physical Exam: exam stable from my  visit  Constitutional: No acute distress, awake, alert, thin, chronically ill-appearing  HENT: NCAT, mucous membranes moist  Respiratory: Clear to auscultation bilaterally, respiratory effort normal   Cardiovascular: RRR, no murmurs, rubs, or gallops, palpable pedal pulses bilaterally  Gastrointestinal: Positive bowel sounds, soft, nontender, nondistended, no guarding or rebound   Musculoskeletal: No bilateral ankle edema  Psychiatric: Appropriate affect, cooperative  Neurologic: Strength symmetric in all extremities  Skin: numerous tattoos to extremities and trunk      Results Reviewed:    Results from last 7 days   Lab Units 19  0517 19  0639 19  0615  19  1315   WBC 10*3/mm3 12.79* 14.30* 14.85*   < > 7.61   HEMOGLOBIN g/dL 9.1* 9.8* 9.7*   < > 8.2*   HEMOGLOBIN, POC   --   --   --    < >  --    HEMATOCRIT % 30.0* 32.6* 31.5*   < > 28.6*   HEMATOCRIT POC   --   --   --    < >  --    PLATELETS 10*3/mm3 291 265 266   < > 547*   INR   --   --   --   --  1.28*   PROCALCITONIN ng/mL  --   --   --   --  0.54*    < > = values in this interval not displayed.     Results from last 7 days   Lab Units 19  0517 19  0603  12/17/19  0615  12/13/19  1315   SODIUM mmol/L 134* 134* 133*   < > 130*   POTASSIUM mmol/L 3.9 4.8 4.7   < > 4.2   CHLORIDE mmol/L 102 102 102   < > 95*   CO2 mmol/L 23.0 27.0 25.0   < > 19.0*   BUN mg/dL 21* 15 14   < > 13   CREATININE mg/dL 0.48* 0.54* 0.61*   < > 1.09   GLUCOSE mg/dL 89 93 97   < > 117*   CALCIUM mg/dL 6.9* 7.5* 7.4*   < > 7.9*   ALT (SGPT) U/L  --  39  --   --  23   AST (SGOT) U/L  --  50*  --   --  35    < > = values in this interval not displayed.     Estimated Creatinine Clearance: 153 mL/min (A) (by C-G formula based on SCr of 0.48 mg/dL (L)).    Microbiology Results Abnormal     Procedure Component Value - Date/Time    Blood Culture - Blood, Arm, Right [[id number]] Collected:  12/14/19 2330    Lab Status:  Final result Specimen:  Blood from Arm, Right Updated:  12/20/19 0316     Blood Culture No growth at 5 days    Blood Culture - Blood, Hand, Right [[id number]] Collected:  12/14/19 2355    Lab Status:  Final result Specimen:  Blood from Hand, Right Updated:  12/20/19 0316     Blood Culture No growth at 5 days    Blood Culture - Blood, Arm, Right [[id number]] Collected:  12/13/19 1315    Lab Status:  Final result Specimen:  Blood from Arm, Right Updated:  12/18/19 1345     Blood Culture No growth at 5 days    Blood Culture - Blood, Arm, Right [[id number]]  (Abnormal) Collected:  12/13/19 1310    Lab Status:  Final result Specimen:  Blood from Arm, Right Updated:  12/15/19 1232     Blood Culture Staphylococcus, coagulase negative     Comment: Probable contaminant requires clinical correlation, susceptibility not performed unless requested by physician.          Isolated from Aerobic Bottle     Gram Stain Aerobic Bottle Gram positive cocci in clusters    Blood Culture ID, PCR - Blood, Arm, Right [[id number]]  (Abnormal) Collected:  12/13/19 1310    Lab Status:  Edited Result - FINAL Specimen:  Blood from Arm, Right Updated:  12/15/19 0621     BCID, PCR Staphylococcus spp, not aureus.  Identification by BCID PCR.     Comment: Corrected result. Previous result was Staphylococcus aureus. mecA (methicillin resistance gene) detected. Identification by BCID PCR. on 12/14/2019 at 2118 EST.       Gastrointestinal Panel, PCR - Stool, Per Rectum [[id number]]  (Normal) Collected:  12/13/19 1545    Lab Status:  Final result Specimen:  Stool from Per Rectum Updated:  12/14/19 0044     Campylobacter Not Detected     Plesiomonas shigelloides Not Detected     Salmonella Not Detected     Vibrio Not Detected     Vibrio cholerae Not Detected     Yersinia enterocolitica Not Detected     Enteroaggregative E. coli (EAEC) Not Detected     Enteropathogenic E. coli (EPEC) Not Detected     Enterotoxigenic E. coli (ETEC) lt/st Not Detected     Shiga-like toxin-producing E. coli (STEC) stx1/stx2 Not Detected     E. coli O157 Not Detected     Shigella/Enteroinvasive E. coli (EIEC) Not Detected     Cryptosporidium Not Detected     Cyclospora cayetanensis Not Detected     Entamoeba histolytica Not Detected     Giardia lamblia Not Detected     Adenovirus F40/41 Not Detected     Astrovirus Not Detected     Norovirus GI/GII Not Detected     Rotavirus A Not Detected     Sapovirus (I, II, IV or V) Not Detected    Narrative:       If Aeromonas, Staphylococcus aureus or Bacillus cereus are suspected, please order [id number]: Stool Culture, Aeromonas, S aureus, B Cereus.    Clostridium Difficile Toxin - Stool, Per Rectum [[id number]] Collected:  12/13/19 1545    Lab Status:  Final result Specimen:  Stool from Per Rectum Updated:  12/13/19 3451    Narrative:       The following orders were created for panel order Clostridium Difficile Toxin - Stool, Per Rectum.  Procedure                               Abnormality         Status                     ---------                               -----------         ------                     Clostridium Difficile To...[[id number]]  Normal              Final result                 Please view results  for these tests on the individual orders.    Clostridium Difficile Toxin, PCR - Stool, Per Rectum [[id number]]  (Normal) Collected:  12/13/19 1545    Lab Status:  Final result Specimen:  Stool from Per Rectum Updated:  12/13/19 1731     C. Difficile Toxins by PCR Not Detected    Narrative:       Performance characteristics of test not established for patients <2 years of age.  Negative for Toxigenic C. Difficile    Fecal Leukocytes - Stool, Per Rectum [[id number]]  (Abnormal) Collected:  12/13/19 1545    Lab Status:  Final result Specimen:  Stool from Per Rectum Updated:  12/13/19 1717     Fecal Leukocytes Many (4+) WBC's Seen          Imaging Results (Last 24 Hours)     ** No results found for the last 24 hours. **               I have reviewed the medications:  Scheduled Meds:    cholecalciferol 1,000 Units Oral Daily   ferrous sulfate 650 mg Oral BID   folic acid 1 mg Oral Daily   methylPREDNISolone sodium succinate 40 mg Intravenous Q12H   pantoprazole 40 mg Oral Q AM   sodium chloride 10 mL Intravenous Q12H   sulfaSALAzine 1,000 mg Oral Q8H     Continuous Infusions:   PRN Meds:.•  acetaminophen **OR** acetaminophen **OR** acetaminophen  •  benzonatate  •  calcium gluconate IVPB **AND** calcium gluconate IVPB **AND** Calcium, Ionized  •  magnesium sulfate **OR** magnesium sulfate **OR** magnesium sulfate  •  ondansetron **OR** ondansetron  •  potassium chloride **OR** potassium chloride **OR** potassium chloride  •  sodium chloride  •  sodium chloride      Assessment/Plan   Assessment / Plan     Active Hospital Problems    Diagnosis  POA   • **Ulcerative colitis (CMS/HCC) [K51.90]  Unknown   • Severe chronic malnutrition (CMS/HCC) [E43]  Yes   • Hyponatremia [E87.1]  Unknown   • Lactic acidosis [E87.2]  Unknown   • Intractable diarrhea [R19.7]  Yes   • Unintentional weight loss [R63.4]  Yes   • Thrombocytosis (CMS/HCC) [D47.3]  Yes   • Iron deficiency anemia [D50.9]  Yes      Resolved Hospital Problems   No  resolved problems to display.        Brief Hospital Course to date:  Rafa Dan is a 49 y.o. male with a past medical history of prior tobacco abuse and recent diagnosis of ulcerative colitis in August 2019 who is admitted with intractable diarrhea and significant recent weight loss due to acute flare of ulcerative colitis. He has had slow improvement to IV steroids and received first dose of Humira on 12/19.     Acute, severe ulcerative pancolitis with hemorrhage, improving   Intractable diarrhea, CDT/GI panel negative. LVQ/Flagyl discontinued. Improving slowly   Lactic Acidosis, due to volume depletion / malnutrition. Resolved   - Continue IV methylprednisolone. Possible transition to prednisone in next 24 hours given change in stool character  - Humira started 12/19. Next dose in 2 weeks. F/u with Tawny Whitten in 2 weeks.   - Appreciate GI assistance   - Continue cholecalciferol, folic acid at dc   - Repeat BMP in AM, encourage oral nutrition     Acute on chronic blood loss anemia, unchanged  - s/p transfusion of 2 units with stable Hgb     Acute severe protein calorie malnutrition with unintentional weight loss  -Continue nutritional supplements. Pt eating well but needs resolution of diarrhea     Thrombocytosis, resolved      Hyponatremia, remains trivially decreased     False Positive Blood culture    DVT Prophylaxis:  SCDs given GI hemorrhage  Disposition: I expect the patient to be discharged home over the weekend once ready to transition to oral steroids and stool output lessens.     CODE STATUS:   Code Status and Medical Interventions:   Ordered at: 12/13/19 1521     Level Of Support Discussed With:    Patient     Code Status:    CPR     Medical Interventions (Level of Support Prior to Arrest):    Full         Electronically signed by Hilario Steinberg MD, 12/20/19, 12:22 PM.

## 2019-12-20 NOTE — PLAN OF CARE
Problem: Patient Care Overview  Goal: Plan of Care Review  Outcome: Ongoing (interventions implemented as appropriate)  Flowsheets  Taken 12/18/2019 1004 by Jazmín Winters PT  Progress: improving  Outcome Summary: Patient continues to progress well towards PT goals. He transferred supine<>sit and STS independently. He increased ambulation distance to 400ft with SBA and no AD. He had multiple episiodes of mild unsteadiness; able to correct himself. Continue per PT POC to progress towards independence. Recommend assessing stairs next session.  Taken 12/19/2019 2000 by Selina Guzmán, RN  Plan of Care Reviewed With: patient  Goal: Discharge Needs Assessment  Outcome: Ongoing (interventions implemented as appropriate)  Flowsheets  Taken 12/13/2019 1748 by Bella Bansal RN  Equipment Currently Used at Home: none  Taken 12/13/2019 1749 by Bella Bansal, RN  Transportation Anticipated: family or friend will provide  Patient/Family Anticipated Services at Transition:   Patient/Family Anticipates Transition to: home with family     Problem: Fall Risk (Adult)  Goal: Absence of Fall  Description  Patient will demonstrate the desired outcomes by discharge/transition of care.  Outcome: Ongoing (interventions implemented as appropriate)  Flowsheets (Taken 12/18/2019 0700 by Helen Hernandez, RN)  Absence of Fall: making progress toward outcome

## 2019-12-20 NOTE — PROGRESS NOTES
Clinical Nutrition   Reason For Visit: Follow-up protocol    Patient Name: Rafa Dan  YOB: 1970  MRN: [id number]  Date of Encounter: 12/20/19 9:43 AM  Admission date: 12/13/2019        Nutrition Assessment     Admission Problem List:  Diarrhea  N/V  Hyponatremia  Iron deficiency anemia s/p blood transfusion  GIB  Melena  Ulcerative colitis flare  Chronic blood loss anemia  Severe, Chronic Malnutrition (dx per RD 12/14, attested by MD)      Applicable PMH:  Ulcerative Colitis (dx 8/2019)  Tobacco use  THC use      Applicable medical tests/procedures since admission:       Reported/Observed/Food/Nutrition Related History   RD spoke with RN on floor yesterday - RN reported patient eating 100% at meals, great appetite, and requested that Boost Plus be changed to Premier Protein so that patient can receive more protein. RD changed the supplement order yesterday afternoon.    Anthropometrics   Height: 72 in  Weight: 131 lbs (standing scale weight 12/19 per nsg doc) - admission weight was 114 lbs, ?wt gain from fluid per RN note (12/19)  BMI: 18.8  BMI classification: Normal: 18.5-24.9kg/m2   IBW: 178 lbs    Per RD note (12/14):  UBW: 155 lbs (7/2019 per patient); patient reports standing wt of 110 lbs 1.5 weeks prior to this admission   Weight change: Unintentional weight loss of 45 lbs (29%) x 5 months - based on  lbs and standing weight 110 lbs 1.5 weeks prior to this admission.      Labs reviewed   Labs reviewed: Yes    Medications reviewed   Medications reviewed: Yes - RD notes patient received 1 dose of lasix yesterday (12/19)  Pertinent: iron, folate, steroid, protonix, sulfasalazine    Current Nutrition Prescription   PO: Diet Regular  Oral Nutrition Supplement: Premier Protein 3x daily    Evaluation of Received Nutrient/Fluid Intake: 100% / 3 meals      Nutrition Diagnosis     12/14  Problem Malnutrition  (severe, chronic)   Etiology Diarrhea in the setting of ulcerative colitis    Signs/Symptoms <75% of usual PO intake > 1 month; Unintentional weight loss of 45 lbs (29%) x 5 months   Status: ongoing    Intervention   Intervention: Follow treatment progress, Care plan reviewed      Goal:   General: Nutrition support treatment  PO: Maintain intake     Additional goals: No further weight loss      Monitoring/Evaluation:     Monitoring/Evaluation: Per protocol, PO intake, Supplement intake, Pertinent labs, Weight, GI status, Symptoms  Will Continue to follow per protocol  Mary Nunes RD  Time Spent: 15 min

## 2019-12-20 NOTE — PROGRESS NOTES
Continued Stay Note   [place]     Patient Name: Rafa Dan  MRN: [id number]  Today's Date: 12/20/2019    Admit Date: 12/13/2019    Discharge Plan     Row Name 12/20/19 1211       Plan    Plan  Home    Patient/Family in Agreement with Plan  yes    Plan Comments  Spoke with patient.  He did not have any questions and denied discharge needs.  Plan to return home when medically ready.      Final Discharge Disposition Code  01 - home or self-care        Discharge Codes    No documentation.             Wendy Rogers RN

## 2019-12-20 NOTE — PROGRESS NOTES
"GI Daily Progress Note  Subjective:    Chief Complaint: Follow up severe ulcerative colitis     Diarrhea is improving.   Less watery and slightly more formed.   Had 6 bowel movements today which is improved .  Denies any further blood in stool.      Received Humira yesterday.       Objective:    BP 91/53 (BP Location: Right arm, Patient Position: Lying)   Pulse 94   Temp 98.2 °F (36.8 °C) (Oral)   Resp 18   Ht 177.8 cm (70\")   Wt 59.4 kg (131 lb)   SpO2 91%   BMI 18.80 kg/m²     Physical Exam   Constitutional: He is oriented to person, place, and time.   Thin and underweight    Cardiovascular: Normal rate and regular rhythm.   Pulmonary/Chest: Effort normal and breath sounds normal. No respiratory distress.   Abdominal: Soft. Bowel sounds are normal. He exhibits no distension. There is no tenderness.   Neurological: He is alert and oriented to person, place, and time.   Nursing note and vitals reviewed.      Lab  Lab Results   Component Value Date    WBC 12.79 (H) 12/20/2019    HGB 9.1 (L) 12/20/2019    HGB 9.8 (L) 12/18/2019    HGB 9.7 (L) 12/17/2019    MCV 88.2 12/20/2019     12/20/2019    INR 1.28 (H) 12/13/2019       Lab Results   Component Value Date    GLUCOSE 89 12/20/2019    BUN 21 (H) 12/20/2019    CREATININE 0.48 (L) 12/20/2019    EGFRIFNONA >150 12/20/2019    BCR 43.8 (H) 12/20/2019     (L) 12/20/2019    K 3.9 12/20/2019    CO2 23.0 12/20/2019    CALCIUM 6.9 (L) 12/20/2019    ALBUMIN 1.90 (L) 12/18/2019    ALKPHOS 199 (H) 12/18/2019    BILITOT 0.2 12/18/2019    ALT 39 12/18/2019    AST 50 (H) 12/18/2019       Assessment:    Severe pan-ulcerative colitis with hemorrhage.  Chronic blood loss anemia.  Non-volitional weight loss.  Severe protein calorie malnutrition    Plan:    >>> Will change IV Solu medrol to 40 mg Prednisone daily  >>> Continue sulfasalazine   >>> Will need 80 mg dose in two weeks.   He has received samples for this.       Anticipate discharge tomorrow or Sunday pending " course.       SONI Alfred  12/20/19  2:24 PM

## 2019-12-21 LAB
ALBUMIN SERPL-MCNC: 1.8 G/DL (ref 3.5–5.2)
ANION GAP SERPL CALCULATED.3IONS-SCNC: 8 MMOL/L (ref 5–15)
BUN BLD-MCNC: 24 MG/DL (ref 6–20)
BUN/CREAT SERPL: 52.2 (ref 7–25)
CALCIUM SPEC-SCNC: 7.3 MG/DL (ref 8.6–10.5)
CHLORIDE SERPL-SCNC: 106 MMOL/L (ref 98–107)
CO2 SERPL-SCNC: 21 MMOL/L (ref 22–29)
CREAT BLD-MCNC: 0.46 MG/DL (ref 0.76–1.27)
DEPRECATED RDW RBC AUTO: 62.6 FL (ref 37–54)
ERYTHROCYTE [DISTWIDTH] IN BLOOD BY AUTOMATED COUNT: 20.7 % (ref 12.3–15.4)
GFR SERPL CREATININE-BSD FRML MDRD: >150 ML/MIN/1.73
GLUCOSE BLD-MCNC: 113 MG/DL (ref 65–99)
HCT VFR BLD AUTO: 33.5 % (ref 37.5–51)
HGB BLD-MCNC: 9.9 G/DL (ref 13–17.7)
MCH RBC QN AUTO: 26.1 PG (ref 26.6–33)
MCHC RBC AUTO-ENTMCNC: 29.6 G/DL (ref 31.5–35.7)
MCV RBC AUTO: 88.2 FL (ref 79–97)
PHOSPHATE SERPL-MCNC: 1.9 MG/DL (ref 2.5–4.5)
PLATELET # BLD AUTO: 316 10*3/MM3 (ref 140–450)
PMV BLD AUTO: 9.5 FL (ref 6–12)
POTASSIUM BLD-SCNC: 4.1 MMOL/L (ref 3.5–5.2)
RBC # BLD AUTO: 3.8 10*6/MM3 (ref 4.14–5.8)
SODIUM BLD-SCNC: 135 MMOL/L (ref 136–145)
WBC NRBC COR # BLD: 11.38 10*3/MM3 (ref 3.4–10.8)

## 2019-12-21 PROCEDURE — 99232 SBSQ HOSP IP/OBS MODERATE 35: CPT | Performed by: NURSE PRACTITIONER

## 2019-12-21 PROCEDURE — 85027 COMPLETE CBC AUTOMATED: CPT | Performed by: HOSPITALIST

## 2019-12-21 PROCEDURE — 80069 RENAL FUNCTION PANEL: CPT | Performed by: HOSPITALIST

## 2019-12-21 PROCEDURE — 63710000001 PREDNISONE PER 1 MG: Performed by: PHYSICIAN ASSISTANT

## 2019-12-21 RX ADMIN — PANTOPRAZOLE SODIUM 40 MG: 40 TABLET, DELAYED RELEASE ORAL at 06:58

## 2019-12-21 RX ADMIN — SODIUM CHLORIDE, PRESERVATIVE FREE 10 ML: 5 INJECTION INTRAVENOUS at 21:45

## 2019-12-21 RX ADMIN — FERROUS SULFATE TAB 325 MG (65 MG ELEMENTAL FE) 650 MG: 325 (65 FE) TAB at 11:03

## 2019-12-21 RX ADMIN — PREDNISONE 40 MG: 20 TABLET ORAL at 11:03

## 2019-12-21 RX ADMIN — FOLIC ACID 1 MG: 1 TABLET ORAL at 11:03

## 2019-12-21 RX ADMIN — SODIUM CHLORIDE, PRESERVATIVE FREE 10 ML: 5 INJECTION INTRAVENOUS at 11:03

## 2019-12-21 RX ADMIN — SULFASALAZINE 1000 MG: 500 TABLET ORAL at 06:58

## 2019-12-21 RX ADMIN — FERROUS SULFATE TAB 325 MG (65 MG ELEMENTAL FE) 650 MG: 325 (65 FE) TAB at 21:44

## 2019-12-21 RX ADMIN — SULFASALAZINE 1000 MG: 500 TABLET ORAL at 21:47

## 2019-12-21 RX ADMIN — VITAMIN D, TAB 1000IU (100/BT) 1000 UNITS: 25 TAB at 11:03

## 2019-12-21 RX ADMIN — SODIUM PHOSPHATE, MONOBASIC, MONOHYDRATE 15 MMOL: 276; 142 INJECTION, SOLUTION INTRAVENOUS at 14:04

## 2019-12-21 RX ADMIN — SULFASALAZINE 1000 MG: 500 TABLET ORAL at 14:05

## 2019-12-21 NOTE — PLAN OF CARE
Problem: Patient Care Overview  Goal: Plan of Care Review  Outcome: Ongoing (interventions implemented as appropriate)  Flowsheets (Taken 12/21/2019 1700)  Progress: improving  Plan of Care Reviewed With: patient  Outcome Summary: pt denies pain/discomfort; reports having only 3 bm's this shift; no other issues; will continue to monitor

## 2019-12-21 NOTE — PROGRESS NOTES
[facility] Medicine Services  PROGRESS NOTE    Patient Name: Rafa Dan  : 1970  MRN: [id number]    Date of Admission: 2019  Primary Care Physician: Diego García APRN    Subjective   Subjective     CC:  Follow-up diarrhea    HPI:  Patient reports 15 bowel movements since yesterday.  States has slightly more formed/thicker consistency.  Denies abdominal pain or further bleeding.  Tolerating diet without nausea or vomiting.  Good oral intake    Review of Systems  Gen- No fevers, chills  Resp- No cough, dyspnea  GI-as above       Objective   Objective     Vital Signs:   Temp:  [97.4 °F (36.3 °C)-98.3 °F (36.8 °C)] 97.4 °F (36.3 °C)  Heart Rate:  [98] 98  Resp:  [18] 18  BP: (111-117)/(68-84) 117/84     Physical Exam:  Constitutional: No acute distress, awake, alert, thin, chronically ill-appearing  HENT: NCAT, mucous membranes moist  Respiratory: Clear to auscultation bilaterally, respiratory effort normal   Cardiovascular: RRR, no murmurs, rubs, or gallops, palpable pedal pulses bilaterally  Gastrointestinal: Positive bowel sounds, soft, nontender, flat, no guarding or rebound   Musculoskeletal: No bilateral ankle edema  Psychiatric: Appropriate affect, cooperative  Neurologic: Strength symmetric in all extremities  Skin: numerous tattoos to extremities and trunk.  No rashes on exposed skin      Results Reviewed:    Results from last 7 days   Lab Units 19  0517 19  0639   WBC 10*3/mm3 11.38* 12.79* 14.30*   HEMOGLOBIN g/dL 9.9* 9.1* 9.8*   HEMATOCRIT % 33.5* 30.0* 32.6*   PLATELETS 10*3/mm3 316 291 265     Results from last 7 days   Lab Units 19  0517 19  0639   SODIUM mmol/L 135* 134* 134*   POTASSIUM mmol/L 4.1 3.9 4.8   CHLORIDE mmol/L 106 102 102   CO2 mmol/L 21.0* 23.0 27.0   BUN mg/dL 24* 21* 15   CREATININE mg/dL 0.46* 0.48* 0.54*   GLUCOSE mg/dL 113* 89 93   CALCIUM mg/dL 7.3* 6.9* 7.5*   ALT (SGPT)  U/L  --   --  39   AST (SGOT) U/L  --   --  50*     Estimated Creatinine Clearance: 159.6 mL/min (A) (by C-G formula based on SCr of 0.46 mg/dL (L)).    Microbiology Results Abnormal     Procedure Component Value - Date/Time    Blood Culture - Blood, Arm, Right [[id number]] Collected:  12/14/19 2330    Lab Status:  Final result Specimen:  Blood from Arm, Right Updated:  12/20/19 0316     Blood Culture No growth at 5 days    Blood Culture - Blood, Hand, Right [[id number]] Collected:  12/14/19 2355    Lab Status:  Final result Specimen:  Blood from Hand, Right Updated:  12/20/19 0316     Blood Culture No growth at 5 days    Blood Culture - Blood, Arm, Right [[id number]] Collected:  12/13/19 1315    Lab Status:  Final result Specimen:  Blood from Arm, Right Updated:  12/18/19 1345     Blood Culture No growth at 5 days    Blood Culture - Blood, Arm, Right [[id number]]  (Abnormal) Collected:  12/13/19 1310    Lab Status:  Final result Specimen:  Blood from Arm, Right Updated:  12/15/19 1232     Blood Culture Staphylococcus, coagulase negative     Comment: Probable contaminant requires clinical correlation, susceptibility not performed unless requested by physician.          Isolated from Aerobic Bottle     Gram Stain Aerobic Bottle Gram positive cocci in clusters    Blood Culture ID, PCR - Blood, Arm, Right [[id number]]  (Abnormal) Collected:  12/13/19 1310    Lab Status:  Edited Result - FINAL Specimen:  Blood from Arm, Right Updated:  12/15/19 0621     BCID, PCR Staphylococcus spp, not aureus. Identification by BCID PCR.     Comment: Corrected result. Previous result was Staphylococcus aureus. mecA (methicillin resistance gene) detected. Identification by BCID PCR. on 12/14/2019 at 2118 EST.       Gastrointestinal Panel, PCR - Stool, Per Rectum [[id number]]  (Normal) Collected:  12/13/19 1545    Lab Status:  Final result Specimen:  Stool from Per Rectum Updated:  12/14/19 0044     Campylobacter Not Detected     Plesiomonas  shigelloides Not Detected     Salmonella Not Detected     Vibrio Not Detected     Vibrio cholerae Not Detected     Yersinia enterocolitica Not Detected     Enteroaggregative E. coli (EAEC) Not Detected     Enteropathogenic E. coli (EPEC) Not Detected     Enterotoxigenic E. coli (ETEC) lt/st Not Detected     Shiga-like toxin-producing E. coli (STEC) stx1/stx2 Not Detected     E. coli O157 Not Detected     Shigella/Enteroinvasive E. coli (EIEC) Not Detected     Cryptosporidium Not Detected     Cyclospora cayetanensis Not Detected     Entamoeba histolytica Not Detected     Giardia lamblia Not Detected     Adenovirus F40/41 Not Detected     Astrovirus Not Detected     Norovirus GI/GII Not Detected     Rotavirus A Not Detected     Sapovirus (I, II, IV or V) Not Detected    Narrative:       If Aeromonas, Staphylococcus aureus or Bacillus cereus are suspected, please order [id number]: Stool Culture, Aeromonas, S aureus, B Cereus.    Clostridium Difficile Toxin - Stool, Per Rectum [[id number]] Collected:  12/13/19 154    Lab Status:  Final result Specimen:  Stool from Per Rectum Updated:  12/13/19 1731    Narrative:       The following orders were created for panel order Clostridium Difficile Toxin - Stool, Per Rectum.  Procedure                               Abnormality         Status                     ---------                               -----------         ------                     Clostridium Difficile To...[[id number]]  Normal              Final result                 Please view results for these tests on the individual orders.    Clostridium Difficile Toxin, PCR - Stool, Per Rectum [[id number]]  (Normal) Collected:  12/13/19 1544    Lab Status:  Final result Specimen:  Stool from Per Rectum Updated:  12/13/19 1731     C. Difficile Toxins by PCR Not Detected    Narrative:       Performance characteristics of test not established for patients <2 years of age.  Negative for Toxigenic C. Difficile    Fecal Leukocytes -  Stool, Per Rectum [[id number]]  (Abnormal) Collected:  12/13/19 8113    Lab Status:  Final result Specimen:  Stool from Per Rectum Updated:  12/13/19 1712     Fecal Leukocytes Many (4+) WBC's Seen          Imaging Results (Last 24 Hours)     ** No results found for the last 24 hours. **               I have reviewed the medications:  Scheduled Meds:    cholecalciferol 1,000 Units Oral Daily   ferrous sulfate 650 mg Oral BID   folic acid 1 mg Oral Daily   pantoprazole 40 mg Oral Q AM   predniSONE 40 mg Oral Daily With Breakfast   sodium chloride 10 mL Intravenous Q12H   sulfaSALAzine 1,000 mg Oral Q8H     Continuous Infusions:   PRN Meds:.•  acetaminophen **OR** acetaminophen **OR** acetaminophen  •  benzonatate  •  calcium gluconate IVPB **AND** calcium gluconate IVPB **AND** Calcium, Ionized  •  magnesium sulfate **OR** magnesium sulfate **OR** magnesium sulfate  •  ondansetron **OR** ondansetron  •  potassium chloride **OR** potassium chloride **OR** potassium chloride  •  sodium chloride  •  sodium chloride      Assessment/Plan   Assessment / Plan     Active Hospital Problems    Diagnosis  POA   • **Ulcerative colitis (CMS/HCC) [K51.90]  Unknown   • Severe chronic malnutrition (CMS/HCC) [E43]  Yes   • Hyponatremia [E87.1]  Unknown   • Lactic acidosis [E87.2]  Unknown   • Intractable diarrhea [R19.7]  Yes   • Unintentional weight loss [R63.4]  Yes   • Thrombocytosis (CMS/HCC) [D47.3]  Yes   • Iron deficiency anemia [D50.9]  Yes      Resolved Hospital Problems   No resolved problems to display.        Brief Hospital Course to date:  Rafa Dan is a 49 y.o. male with a past medical history of prior tobacco abuse and recent diagnosis of ulcerative colitis in August 2019 who is admitted with intractable diarrhea and significant recent weight loss due to acute flare of ulcerative colitis. He has had slow improvement to IV steroids and received first dose of Humira on 12/19.     Acute, severe ulcerative  pancolitis with hemorrhage, improving   Intractable diarrhea, CDT/GI panel negative. LVQ/Flagyl discontinued.  Stable/improving slowly     Lactic Acidosis, due to volume depletion / malnutrition. Resolved   - S/P methylprednisolone.  Transition to prednisone today.  Monitor stool output/consistency.  If stable/improving today, possibly home tomorrow  - Humira started 12/19. Next dose in 2 weeks. F/u with Tawny Whitten in 2 weeks.   - Appreciate GI assistance   - Continue cholecalciferol, folic acid at dc   - Repeat BMP in AM, encourage oral nutrition     Acute on chronic blood loss anemia, unchanged  - s/p transfusion of 2 units with stable Hgb     Acute severe protein calorie malnutrition with unintentional weight loss  -Continue nutritional supplements. Pt eating well but needs resolution of diarrhea     Thrombocytosis, resolved      Hyponatremia, remains trivially decreased     False Positive Blood culture    DVT Prophylaxis:  SCDs given GI hemorrhage  Disposition: I expect the patient to be discharged home over the weekend if tolerates transition to oral steroids and stool output lessens.     CODE STATUS:   Code Status and Medical Interventions:   Ordered at: 12/13/19 1521     Level Of Support Discussed With:    Patient     Code Status:    CPR     Medical Interventions (Level of Support Prior to Arrest):    Full         Electronically signed by DANA Chambers, 12/21/19, 9:24 AM.

## 2019-12-21 NOTE — PLAN OF CARE
Problem: Patient Care Overview  Goal: Plan of Care Review  Outcome: Ongoing (interventions implemented as appropriate)  Flowsheets  Taken 12/21/2019 6817  Progress: improving  Outcome Summary: VSS. Pt continues to have many liquid soft BMs- 5, some bloody streaks. No complaints of pain or nausea. Sleeping well between care. Eating well. No new concerns, will continue to monitor.  Taken 12/20/2019 4441  Plan of Care Reviewed With: patient

## 2019-12-22 VITALS
OXYGEN SATURATION: 91 % | HEART RATE: 112 BPM | DIASTOLIC BLOOD PRESSURE: 59 MMHG | TEMPERATURE: 98.4 F | SYSTOLIC BLOOD PRESSURE: 101 MMHG | RESPIRATION RATE: 18 BRPM | HEIGHT: 70 IN | BODY MASS INDEX: 18.75 KG/M2 | WEIGHT: 131 LBS

## 2019-12-22 PROBLEM — E87.20 LACTIC ACIDOSIS: Status: RESOLVED | Noted: 2019-12-13 | Resolved: 2019-12-22

## 2019-12-22 PROBLEM — E87.1 HYPONATREMIA: Status: RESOLVED | Noted: 2019-12-13 | Resolved: 2019-12-22

## 2019-12-22 LAB
ANION GAP SERPL CALCULATED.3IONS-SCNC: 8 MMOL/L (ref 5–15)
BUN BLD-MCNC: 12 MG/DL (ref 6–20)
BUN/CREAT SERPL: 22.6 (ref 7–25)
CALCIUM SPEC-SCNC: 7.6 MG/DL (ref 8.6–10.5)
CHLORIDE SERPL-SCNC: 105 MMOL/L (ref 98–107)
CO2 SERPL-SCNC: 23 MMOL/L (ref 22–29)
CREAT BLD-MCNC: 0.53 MG/DL (ref 0.76–1.27)
GFR SERPL CREATININE-BSD FRML MDRD: >150 ML/MIN/1.73
GLUCOSE BLD-MCNC: 79 MG/DL (ref 65–99)
POTASSIUM BLD-SCNC: 3.7 MMOL/L (ref 3.5–5.2)
SODIUM BLD-SCNC: 136 MMOL/L (ref 136–145)

## 2019-12-22 PROCEDURE — 63710000001 PREDNISONE PER 1 MG: Performed by: PHYSICIAN ASSISTANT

## 2019-12-22 PROCEDURE — 80048 BASIC METABOLIC PNL TOTAL CA: CPT | Performed by: NURSE PRACTITIONER

## 2019-12-22 PROCEDURE — 99239 HOSP IP/OBS DSCHRG MGMT >30: CPT | Performed by: HOSPITALIST

## 2019-12-22 RX ORDER — SULFASALAZINE 500 MG/1
1000 TABLET ORAL
Status: DISCONTINUED | OUTPATIENT
Start: 2019-12-22 | End: 2019-12-22 | Stop reason: HOSPADM

## 2019-12-22 RX ORDER — PANTOPRAZOLE SODIUM 40 MG/1
40 TABLET, DELAYED RELEASE ORAL DAILY
Qty: 30 TABLET | Refills: 1 | Status: SHIPPED | OUTPATIENT
Start: 2019-12-22

## 2019-12-22 RX ORDER — PREDNISONE 10 MG/1
TABLET ORAL
Qty: 60 TABLET | Refills: 1 | Status: SHIPPED | OUTPATIENT
Start: 2019-12-22 | End: 2020-01-31 | Stop reason: SDUPTHER

## 2019-12-22 RX ADMIN — FOLIC ACID 1 MG: 1 TABLET ORAL at 08:25

## 2019-12-22 RX ADMIN — SODIUM CHLORIDE, PRESERVATIVE FREE 10 ML: 5 INJECTION INTRAVENOUS at 08:26

## 2019-12-22 RX ADMIN — PANTOPRAZOLE SODIUM 40 MG: 40 TABLET, DELAYED RELEASE ORAL at 05:58

## 2019-12-22 RX ADMIN — VITAMIN D, TAB 1000IU (100/BT) 1000 UNITS: 25 TAB at 08:25

## 2019-12-22 RX ADMIN — SULFASALAZINE 1000 MG: 500 TABLET ORAL at 08:25

## 2019-12-22 RX ADMIN — SULFASALAZINE 1000 MG: 500 TABLET ORAL at 12:28

## 2019-12-22 RX ADMIN — FERROUS SULFATE TAB 325 MG (65 MG ELEMENTAL FE) 650 MG: 325 (65 FE) TAB at 08:25

## 2019-12-22 RX ADMIN — PREDNISONE 40 MG: 20 TABLET ORAL at 08:25

## 2019-12-22 NOTE — PLAN OF CARE
Problem: Patient Care Overview  Goal: Plan of Care Review  Outcome: Ongoing (interventions implemented as appropriate)  Flowsheets  Taken 12/22/2019 4397  Progress: no change  Outcome Summary: VSS. No complaints of pain or nausea. Only 3 BMs so far this shift. No new concerns. Eating well. On room air. A&O. Resting well between care. Will continue to monitor.  Taken 12/22/2019 7682  Plan of Care Reviewed With: patient

## 2019-12-22 NOTE — DISCHARGE SUMMARY
[facility] Medicine Services  DISCHARGE SUMMARY    Patient Name: Rafa Dan  : 1970  MRN: [id number]    Date of Admission: 2019 12:48 PM  Date of Discharge:  19  Primary Care Physician: Diego García APRN    Consults     Date and Time Order Name Status Description    2019 1752 Inpatient Gastroenterology Consult Completed           Hospital Course     Presenting Problem:   Ulcerative colitis (CMS/HCC) [K51.90]    Active Hospital Problems    Diagnosis  POA   • **Ulcerative colitis (CMS/HCC) [K51.90]  Yes   • Severe chronic malnutrition (CMS/HCC) [E43]  Yes   • Intractable diarrhea [R19.7]  Yes   • Unintentional weight loss [R63.4]  Yes   • Thrombocytosis (CMS/HCC) [D47.3]  Yes   • Iron deficiency anemia [D50.9]  Yes      Resolved Hospital Problems    Diagnosis Date Resolved POA   • Hyponatremia [E87.1] 2019 Yes   • Lactic acidosis [E87.2] 2019 Yes        Hospital Course:  Rafa Dan is a 49 y.o. male with a past medical history of prior tobacco abuse and ulcerative colitis (diagnosed 2019) who was admitted on 2019 with a several week history of diarrhea, up to 30 episodes per day, lactic acidosis, and unintentional weight loss.  Patient also reported intermittent hematochezia but no fevers or chills.  Patient was on sulfasalazine prior to admissions but had had a lapse in treatment due to issue with refill.  C. difficile toxin and GI PCR panel were negative. Fecal leukocytes were positive and CT abd/pelvis showed inflammatory colitis, all supportive of acute flare of ulcerative colitis. Patient was placed on IV steroids, GI was consulted and he had slow clinical progress with copious diarrhea throughout hospitalization. He did not require IV antibiotics (positive blood culture was a contaminant).  He clinically improved and was able to be weaned to PO steroids prior to discharge with plans for taper. He had significant  weight loss of 45lbs over past year and was diagnosed with severe chronic malnutrition. Dietician followed throughout hospitalization and pt had excellent oral nutrition. He received his first dose of Humira on 12/19. He will have his next dose in 2 weeks and will have close outpt f/u with GI on 1/3/2020.  He will resume folic acid, vitamin D, and Iron at discharge. He was prescribed PPI for GI prophylaxis while on steroids.     Due to acute blood loss anemia from GI blood loss, pt received a total of 2 units PRBCs this admission. His Hgb was stable prior to discharge.       Discharge Follow Up Recommendations for labs/diagnostics:  1. Follow up with PCP in 1 week.   2. Follow up in [facility] clinic on 1/3/2020 as scheduled.  3. Take 40 mg of Prednisone daily x 7 days then change to 30mg daily until 1/3/20 appointment at which time further instructions on taper can be given.     Day of Discharge     HPI:   F/u diarrhea    Pt's stools are now more pudding consistency and less frequent, noting 10 in the past 24 hours. He feels well enough to go home today.     Review of Systems  Gen- No fevers, chills  Resp- No cough, dyspnea    Vital Signs:   Temp:  [98.4 °F (36.9 °C)] 98.4 °F (36.9 °C)  Heart Rate:  [112] 112  Resp:  [18] 18  BP: (102)/(63) 102/63     Physical Exam:  Constitutional: No acute distress, awake, alert  HENT: NCAT, mucous membranes moist  Respiratory: Clear to auscultation bilaterally, respiratory effort normal   Cardiovascular: RRR, no murmurs, rubs, or gallops, palpable pedal pulses bilaterally  Gastrointestinal: Positive bowel sounds, soft, nontender, nondistended, thin   Musculoskeletal: No bilateral ankle edema  Psychiatric: Flat affect, cooperative  Skin: Numerous tattoos      Pertinent  and/or Most Recent Results     Results from last 7 days   Lab Units 12/22/19  0409 12/21/19  0427 12/20/19  0517 12/18/19  0639 12/17/19  0615 12/16/19  0657 12/16/19  0656 12/15/19  2328 12/15/19  1603   WBC  10*3/mm3  --  11.38* 12.79* 14.30* 14.85*  --  10.91*  --   --    HEMOGLOBIN g/dL  --  9.9* 9.1* 9.8* 9.7*  --  9.5* 9.2* 9.5*   HEMATOCRIT %  --  33.5* 30.0* 32.6* 31.5*  --  31.1* 30.3* 30.8*   PLATELETS 10*3/mm3  --  316 291 265 266  --  250  --   --    SODIUM mmol/L 136 135* 134* 134* 133* 131*  --   --   --    POTASSIUM mmol/L 3.7 4.1 3.9 4.8 4.7 4.7  --   --   --    CHLORIDE mmol/L 105 106 102 102 102 101  --   --   --    CO2 mmol/L 23.0 21.0* 23.0 27.0 25.0 24.0  --   --   --    BUN mg/dL 12 24* 21* 15 14 11  --   --   --    CREATININE mg/dL 0.53* 0.46* 0.48* 0.54* 0.61* 0.61*  --   --   --    GLUCOSE mg/dL 79 113* 89 93 97 112*  --   --   --    CALCIUM mg/dL 7.6* 7.3* 6.9* 7.5* 7.4* 7.0*  --   --   --      Results from last 7 days   Lab Units 12/18/19  0639   BILIRUBIN mg/dL 0.2   ALK PHOS U/L 199*   ALT (SGPT) U/L 39   AST (SGOT) U/L 50*           Invalid input(s): TG, LDLCALC, LDLREALC        Brief Urine Lab Results  (Last result in the past 365 days)      Color   Clarity   Blood   Leuk Est   Nitrite   Protein   CREAT   Urine HCG        12/13/19 1532 Yellow Clear Negative Negative Negative 30 mg/dL (1+)               Microbiology Results Abnormal     Procedure Component Value - Date/Time    Blood Culture - Blood, Arm, Right [[id number]] Collected:  12/14/19 2160    Lab Status:  Final result Specimen:  Blood from Arm, Right Updated:  12/20/19 0316     Blood Culture No growth at 5 days    Blood Culture - Blood, Hand, Right [[id number]] Collected:  12/14/19 5103    Lab Status:  Final result Specimen:  Blood from Hand, Right Updated:  12/20/19 0316     Blood Culture No growth at 5 days    Blood Culture - Blood, Arm, Right [[id number]] Collected:  12/13/19 1315    Lab Status:  Final result Specimen:  Blood from Arm, Right Updated:  12/18/19 1345     Blood Culture No growth at 5 days    Blood Culture - Blood, Arm, Right [[id number]]  (Abnormal) Collected:  12/13/19 1310    Lab Status:  Final result Specimen:  Blood  from Arm, Right Updated:  12/15/19 1232     Blood Culture Staphylococcus, coagulase negative     Comment: Probable contaminant requires clinical correlation, susceptibility not performed unless requested by physician.          Isolated from Aerobic Bottle     Gram Stain Aerobic Bottle Gram positive cocci in clusters    Blood Culture ID, PCR - Blood, Arm, Right [[id number]]  (Abnormal) Collected:  12/13/19 1310    Lab Status:  Edited Result - FINAL Specimen:  Blood from Arm, Right Updated:  12/15/19 0621     BCID, PCR Staphylococcus spp, not aureus. Identification by BCID PCR.     Comment: Corrected result. Previous result was Staphylococcus aureus. mecA (methicillin resistance gene) detected. Identification by BCID PCR. on 12/14/2019 at 2118 EST.       Gastrointestinal Panel, PCR - Stool, Per Rectum [[id number]]  (Normal) Collected:  12/13/19 1545    Lab Status:  Final result Specimen:  Stool from Per Rectum Updated:  12/14/19 0044     Campylobacter Not Detected     Plesiomonas shigelloides Not Detected     Salmonella Not Detected     Vibrio Not Detected     Vibrio cholerae Not Detected     Yersinia enterocolitica Not Detected     Enteroaggregative E. coli (EAEC) Not Detected     Enteropathogenic E. coli (EPEC) Not Detected     Enterotoxigenic E. coli (ETEC) lt/st Not Detected     Shiga-like toxin-producing E. coli (STEC) stx1/stx2 Not Detected     E. coli O157 Not Detected     Shigella/Enteroinvasive E. coli (EIEC) Not Detected     Cryptosporidium Not Detected     Cyclospora cayetanensis Not Detected     Entamoeba histolytica Not Detected     Giardia lamblia Not Detected     Adenovirus F40/41 Not Detected     Astrovirus Not Detected     Norovirus GI/GII Not Detected     Rotavirus A Not Detected     Sapovirus (I, II, IV or V) Not Detected    Narrative:       If Aeromonas, Staphylococcus aureus or Bacillus cereus are suspected, please order [id number]: Stool Culture, Aeromonas, S aureus, B Cereus.    Clostridium Difficile  Toxin - Stool, Per Rectum [[id number]] Collected:  12/13/19 1545    Lab Status:  Final result Specimen:  Stool from Per Rectum Updated:  12/13/19 1731    Narrative:       The following orders were created for panel order Clostridium Difficile Toxin - Stool, Per Rectum.  Procedure                               Abnormality         Status                     ---------                               -----------         ------                     Clostridium Difficile To...[[id number]]  Normal              Final result                 Please view results for these tests on the individual orders.    Clostridium Difficile Toxin, PCR - Stool, Per Rectum [[id number]]  (Normal) Collected:  12/13/19 1545    Lab Status:  Final result Specimen:  Stool from Per Rectum Updated:  12/13/19 1731     C. Difficile Toxins by PCR Not Detected    Narrative:       Performance characteristics of test not established for patients <2 years of age.  Negative for Toxigenic C. Difficile    Fecal Leukocytes - Stool, Per Rectum [[id number]]  (Abnormal) Collected:  12/13/19 1545    Lab Status:  Final result Specimen:  Stool from Per Rectum Updated:  12/13/19 1717     Fecal Leukocytes Many (4+) WBC's Seen          Imaging Results (All)     Procedure Component Value Units Date/Time    CT Abdomen Pelvis With Contrast [[id number]] Collected:  12/13/19 2118     Updated:  12/13/19 2120    Narrative:       CT Abdomen Pelvis W    INDICATION:   49-year-old male with abdominal pain and nausea and vomiting for 2 to 3 weeks. History of ulcerative colitis and sepsis.    TECHNIQUE:   CT of the abdomen and pelvis with IV contrast. Coronal and sagittal reconstructions were obtained.  Radiation dose reduction techniques included automated exposure control or exposure modulation based on body size. Count of known CT and cardiac nuc med  studies performed in previous 12 months: 1.     COMPARISON:   CT abdomen pelvis 10/15/2019    FINDINGS:  Visualized lung bases are  unremarkable.    Abdomen:   Severe diffuse fatty infiltration throughout the liver. The spleen and pancreas are normal. The gallbladder is normal. Both adrenal glands are normal. Small right renal cysts in the upper pole. The kidneys are otherwise unremarkable. Abdominal aorta  normal in course and caliber with extensive atherosclerotic calcification, but no dissection. Small bowel is unremarkable without obstruction. The appendix is again noted to be borderline in size, measuring up to 9 mm in maximum diameter. Slight interval  decrease in pancolonic wall thickening and mild pericolonic inflammatory stranding. No free fluid or free air.    Pelvis:   The urinary bladder is unremarkable.  The prostate gland is unremarkable. No free pelvic fluid.    No acute osseous abnormality.        Impression:         1. Slight interval decrease in pancolonic circumferential wall thickening and mild pericolonic inflammatory stranding. Findings again suggest inflammatory colitis, consistent with provided clinical history of ulcerative colitis. Infectious or ischemic  colitis is considered less likely.  2. Appendix again noted to be borderline in size, measuring 9 mm in maximum diameter. This is not significantly changed from the comparison study from 10/5/2019. This again may represent reactive inflammation or enlargement of the appendix, but  correlation should be made clinically to exclude acute appendicitis.  3. Severe hepatic steatosis.          Signer Name: David Jones MD   Signed: 12/13/2019 9:18 PM   Workstation [facility]: [id number]    XR Chest 1 View [[id number]] Collected:  12/13/19 1311     Updated:  12/13/19 1636    Narrative:       EXAMINATION: XR CHEST 1 VW- 12/13/2019     INDICATION: Severe Sepsis triage protocol      COMPARISON: NONE     FINDINGS: Single portable chest radiograph was submitted for review.    Two acquisitions imaging the entire chest.      The heart is not  enlarged. Chronic changes of the lungs are identified  without evidence of active airspace disease. Visualized upper abdomen is  unrevealing.  No acute osseous abnormality identified.        Impression:       1. Chronic appearing emphysematous changes of the lungs without evidence  for acute or active air space disease.     2. Bullous changes in the right lung apex are suggested.     D:  12/13/2019  E:  12/13/2019     This report was finalized on 12/13/2019 4:33 PM by Dr. Hilario Abreu MD.                              Discharge Details        Discharge Medications      New Medications      Instructions Start Date   pantoprazole 40 MG EC tablet  Commonly known as:  PROTONIX   40 mg, Oral, Daily      predniSONE 10 MG tablet  Commonly known as:  DELTASONE   Take 4 tabs daily x 7 days, then 3 tabs daily until otherwise instructed         Continue These Medications      Instructions Start Date   Cyanocobalamin 1000 MCG sublingual tablet   1,000 mcg, Sublingual, Daily      ergocalciferol 1.25 MG (55430 UT) capsule  Commonly known as:  ERGOCALCIFEROL   50,000 Units, Oral, Weekly      ferrous sulfate 325 (65 FE) MG tablet   650 mg, Oral, 2 Times Daily, Take with OTC Vitamin C 500 mg 1 hour before or 2 hours after meals to enhance absorption      folic acid 1 MG tablet  Commonly known as:  FOLVITE   1 mg, Oral, Daily      Risaquad-2 capsule capsule   1 capsule, Oral, Daily      sulfaSALAzine 500 MG EC tablet  Commonly known as:  AZULFIDINE   1,000 mg, Oral, 3 Times Daily      vitamin C 500 MG tablet  Commonly known as:  ASCORBIC ACID   500 mg, Oral, 2 Times Daily             No Known Allergies      Discharge Disposition:  Home or Self Care    Diet:  Hospital:  Diet Order   Procedures   • Diet Regular       Activity:  Activity Instructions     Activity as Tolerated            CODE STATUS:    Code Status and Medical Interventions:   Ordered at: 12/13/19 1521     Level Of Support Discussed With:    Patient     Code Status:     CPR     Medical Interventions (Level of Support Prior to Arrest):    Full       Future Appointments   Date Time Provider Department Center   1/3/2020  2:00 PM Rich Quiñones APRN [facility] None   1/22/2020  9:30 AM Diego García APRN [facility] None   2/25/2020  9:20 AM Tawny Whitten APRN [facility] None       Additional Instructions for the Follow-ups that You Need to Schedule     Discharge Follow-up with PCP   As directed       Currently Documented PCP:    Diego García APRN    PCP Phone Number:    [phone]     Follow Up Details:  in 1 week         Discharge Follow-up with Specified Provider: [facility] on 1/3/2020 as scheduled   As directed      To:  [facility] on 1/3/2020 as scheduled               Time Spent on Discharge:  35 minutes    Electronically signed by Hilario Steinberg MD, 12/22/19, 9:44 AM.

## 2019-12-23 ENCOUNTER — READMISSION MANAGEMENT (OUTPATIENT)
Dept: CALL CENTER | Facility: HOSPITAL | Age: 49
End: 2019-12-23

## 2019-12-23 ENCOUNTER — TELEPHONE (OUTPATIENT)
Dept: GASTROENTEROLOGY | Facility: CLINIC | Age: 49
End: 2019-12-23

## 2019-12-24 ENCOUNTER — TELEPHONE (OUTPATIENT)
Dept: FAMILY MEDICINE CLINIC | Facility: CLINIC | Age: 49
End: 2019-12-24

## 2019-12-24 NOTE — TELEPHONE ENCOUNTER
Pt states he is doing better. BMs are about the same as yesterday but overall better. Eating/drinking fine. No n/v, pain, or fever/chills. Pt agreeable to an appt with pcp and scheduled w/ Diego García 1/6. Appt reminder mailed. TCM N/A due to no insurance coverage.

## 2019-12-26 ENCOUNTER — READMISSION MANAGEMENT (OUTPATIENT)
Dept: CALL CENTER | Facility: HOSPITAL | Age: 49
End: 2019-12-26

## 2019-12-26 NOTE — OUTREACH NOTE
Medical Week 1 Survey      Responses   Facility patient discharged from?  [facility]   Does the patient have one of the following disease processes/diagnoses(primary or secondary)?  Other   Is there a successful TCM telephone encounter documented?  No   Week 1 attempt successful?  No   Unsuccessful attempts  Attempt 1          Annette Ambrocio RN

## 2019-12-30 ENCOUNTER — READMISSION MANAGEMENT (OUTPATIENT)
Dept: CALL CENTER | Facility: HOSPITAL | Age: 49
End: 2019-12-30

## 2019-12-30 NOTE — OUTREACH NOTE
Medical Week 1 Survey      Responses   Facility patient discharged from?  [place]   Does the patient have one of the following disease processes/diagnoses(primary or secondary)?  Other   Is there a successful TCM telephone encounter documented?  No   Week 1 attempt successful?  No   Unsuccessful attempts  Attempt 2          Lisa Maher RN

## 2019-12-31 ENCOUNTER — READMISSION MANAGEMENT (OUTPATIENT)
Dept: CALL CENTER | Facility: HOSPITAL | Age: 49
End: 2019-12-31

## 2019-12-31 NOTE — OUTREACH NOTE
Medical Week 2 Survey      Responses   Facility patient discharged from?  [place]   Does the patient have one of the following disease processes/diagnoses(primary or secondary)?  Other   Week 2 attempt successful?  Yes   Call start time  1410   Discharge diagnosis  Ulcerative colitis,    Intractable diarrhea    Call end time  1412   Meds reviewed with patient/caregiver?  Yes   Is the patient having any side effects they believe may be caused by any medication additions or changes?  No   Does the patient have all medications ordered at discharge?  Yes   Is the patient taking all medications as directed (includes completed medication regime)?  Yes   Does the patient have a primary care provider?   Yes   Does the patient have an appointment with their PCP within 7 days of discharge?  Yes   Has the patient kept scheduled appointments due by today?  Yes   Has home health visited the patient within 72 hours of discharge?  N/A   Psychosocial issues?  No   Did the patient receive a copy of their discharge instructions?  Yes   Nursing interventions  Reviewed instructions with patient   What is the patient's perception of their health status since discharge?  Improving   Is the patient/caregiver able to teach back signs and symptoms related to disease process for when to call PCP?  Yes   Is the patient/caregiver able to teach back signs and symptoms related to disease process for when to call 911?  Yes   Is the patient/caregiver able to teach back the hierarchy of who to call/visit for symptoms/problems? PCP, Specialist, Home health nurse, Urgent Care, ED, 911  Yes   Week 2 Call Completed?  Yes          Rhys Harvey RN

## 2020-01-03 ENCOUNTER — OFFICE VISIT (OUTPATIENT)
Dept: GASTROENTEROLOGY | Facility: CLINIC | Age: 50
End: 2020-01-03

## 2020-01-03 VITALS
HEART RATE: 104 BPM | WEIGHT: 152 LBS | BODY MASS INDEX: 21.81 KG/M2 | RESPIRATION RATE: 16 BRPM | TEMPERATURE: 98.2 F | DIASTOLIC BLOOD PRESSURE: 62 MMHG | SYSTOLIC BLOOD PRESSURE: 120 MMHG

## 2020-01-03 DIAGNOSIS — K51.018 ULCERATIVE PANCOLITIS WITH OTHER COMPLICATION (HCC): ICD-10-CM

## 2020-01-03 PROCEDURE — 99214 OFFICE O/P EST MOD 30 MIN: CPT | Performed by: NURSE PRACTITIONER

## 2020-01-03 RX ORDER — SULFASALAZINE 500 MG/1
1000 TABLET, DELAYED RELEASE ORAL 3 TIMES DAILY
Qty: 180 TABLET | Refills: 5 | Status: SHIPPED | OUTPATIENT
Start: 2020-01-03 | End: 2020-01-09 | Stop reason: SDUPTHER

## 2020-01-03 NOTE — PROGRESS NOTES
GASTROENTEROLOGY OFFICE NOTE  Rafa Dan  [id number]  1970    CARE TEAM  Patient Care Team:  Diego García APRN as PCP - General (Family Medicine)    Referring Provider: Diego García APRN    Chief Complaint   Patient presents with   • Follow-up     Ulcerative pancolitis with other complication         HISTORY OF PRESENT ILLNESS:  Mr. Dan presents in hospital follow-up with ulcerative pancolitis.  He was diagnosed with ulcerative colitis in October 2019 and was started on sulfasalazine.  Prior to his diagnosis he was having 30-40 loose to watery bowel movements with nocturnal symptoms daily.  It seems he had significant improvement with sulfasalazine but unfortunately, did not understand the disease process and when his month supply of sulfasalazine ran out he stopped taking it and his extreme symptoms returned.  He was seen in follow-up and restarted on sulfasalazine but never had improvement again.  Ultimately, he presented in the ER once again on 12/13 and was admitted to [facility] through 12/22 with ulcerative colitis, severe chronic malnutrition, irretractable diarrhea, unintentional weight loss, thrombocytosis, and iron deficiency anemia.    Humira was initiated on 12/19 and he is due for his second dose today.  He was also started on steroids and discharged taking 40 mg daily for 1 week then decreasing to 30 mg daily which he continues on today and sulfasalazine 1000 mg 3 times daily.  He reports his symptoms are much improved.  He is having up to 10 bowel movements daily although not every day.  He continues to wake up a couple of times a night for nocturnal bowel movements, prior to initiation of Humira he was up every 20 minutes through the night.  He is not having any blood in his stool nor associated abdominal pain.  He is gaining some of his energy back and plans to return to work next week.    PAST MEDICAL HISTORY  Past Medical History:   Diagnosis Date   • Colitis     • Ulcerative colitis (CMS/HCC)         PAST SURGICAL HISTORY  Past Surgical History:   Procedure Laterality Date   • COLONOSCOPY N/A 10/7/2019    Procedure: COLONOSCOPY;  Surgeon: Brunner, Mark I, MD;  [facility];  Service: Gastroenterology        MEDICATIONS:    Current Outpatient Medications:   •  Cyanocobalamin 1000 MCG sublingual tablet, Place 1 tablet under the tongue Daily., Disp: 90 each, Rfl: 3  •  ferrous sulfate 325 (65 FE) MG tablet, Take 2 tablets by mouth 2 (Two) Times a Day. Take with OTC Vitamin C 500 mg 1 hour before or 2 hours after meals to enhance absorption, Disp: 120 tablet, Rfl: 2  •  folic acid (FOLVITE) 1 MG tablet, Take 1 mg by mouth Daily., Disp: , Rfl:   •  pantoprazole (PROTONIX) 40 MG EC tablet, Take 1 tablet by mouth Daily., Disp: 30 tablet, Rfl: 1  •  predniSONE (DELTASONE) 10 MG tablet, Take 4 tabs daily x 7 days, then 3 tabs daily until otherwise instructed, Disp: 60 tablet, Rfl: 1  •  Probiotic Product (RISAQUAD-2) capsule capsule, Take 1 capsule by mouth Daily., Disp: , Rfl:   •  sulfaSALAzine (AZULFIDINE) 500 MG EC tablet, Take 2 tablets by mouth 3 (Three) Times a Day., Disp: 180 tablet, Rfl: 5  •  vitamin C (ASCORBIC ACID) 500 MG tablet, Take 1 tablet by mouth 2 (Two) Times a Day., Disp: 120 tablet, Rfl: 2  •  vitamin D (ERGOCALCIFEROL) 1.25 MG (69203 UT) capsule capsule, Take 1 capsule by mouth 1 (One) Time Per Week for 8 doses., Disp: 8 capsule, Rfl: 0    ALLERGIES  No Known Allergies    FAMILY HISTORY:  Family History   Family history unknown: Yes       SOCIAL HISTORY  Social History     Socioeconomic History   • Marital status: Single     Spouse name: Not on file   • Number of children: Not on file   • Years of education: Not on file   • Highest education level: Not on file   Tobacco Use   • Smoking status: Former Smoker     Packs/day: 0.00     Years: 15.00     Pack years: 0.00   • Smokeless tobacco: Never Used   • Tobacco comment: quit 4 yearse ago     Substance and Sexual Activity   • Alcohol use: No     Frequency: Never   • Drug use: Yes     Frequency: 7.0 times per week     Types: Marijuana     Comment: Stopped 4 years ago.       REVIEW OF SYSTEMS  Review of Systems   Constitutional: Positive for fatigue. Negative for activity change, appetite change, chills, diaphoresis, fever, unexpected weight gain and unexpected weight loss.   HENT: Negative for trouble swallowing and voice change.    Gastrointestinal: Positive for diarrhea. Negative for abdominal distention, abdominal pain, anal bleeding, blood in stool, constipation, nausea, rectal pain, vomiting, GERD and indigestion.         PHYSICAL EXAM   /62   Pulse 104   Temp 98.2 °F (36.8 °C) (Temporal)   Resp 16   Wt 68.9 kg (152 lb)   BMI 21.81 kg/m²   Physical Exam   Constitutional: He is oriented to person, place, and time. He appears well-developed and well-nourished.   HENT:   Head: Normocephalic.   Mouth/Throat: Oropharynx is clear and moist.   Eyes: Pupils are equal, round, and reactive to light. EOM are normal.   Neck: Normal range of motion. Neck supple.   Cardiovascular: Normal rate and regular rhythm.   Pulmonary/Chest: Effort normal and breath sounds normal. He has no wheezes. He has no rales.   Abdominal: Soft. Bowel sounds are normal. He exhibits no mass. There is no tenderness. There is no rebound and no guarding. No hernia.   Musculoskeletal: Normal range of motion.   Neurological: He is alert and oriented to person, place, and time. No cranial nerve deficit.   Skin: Skin is warm and dry.   Psychiatric: He has a normal mood and affect. His behavior is normal. Judgment normal.   Nursing note and vitals reviewed.    Results Review:  I have reviewed the patient's new clinical results.    ASSESSMENT / PLAN  1.  Ulcerative pancolitis  #Second dose of Humira given to patient today in office.  He will begin maintenance dosing 2 weeks from today.  He has been approved for patient assistance  from [employer] assist and will receive his remainder of medications from patient assistance.  I have provided him with the phone number to his patient assistance counselor as his medication should be coming to his home, should he not receive his next dose within a week he has been instructed to call them.  #I would like for him to be steroid free and will continue to taper steroids but patient has been instructed that throughout the taper if his symptoms rebound he is to call me for further instructions.    Plan:  - Continue Humira  - Taper prednisone: 30 mg X 1 additional day (6 days total), then 20 mg for 5 days, then 10 mg for 4 days, then stop.  -Continue sulfasalazine 1000 mg 3 times daily, refill Rx sent today    Return in about 4 weeks (around 1/31/2020) for Follow Up w/ Tawny Whitten.    I discussed the patients findings and my recommendations with patient    DANA Croft

## 2020-01-09 ENCOUNTER — READMISSION MANAGEMENT (OUTPATIENT)
Dept: CALL CENTER | Facility: HOSPITAL | Age: 50
End: 2020-01-09

## 2020-01-09 ENCOUNTER — OFFICE VISIT (OUTPATIENT)
Dept: FAMILY MEDICINE CLINIC | Facility: CLINIC | Age: 50
End: 2020-01-09

## 2020-01-09 VITALS
DIASTOLIC BLOOD PRESSURE: 60 MMHG | WEIGHT: 135 LBS | TEMPERATURE: 97.4 F | OXYGEN SATURATION: 98 % | HEIGHT: 70 IN | BODY MASS INDEX: 19.33 KG/M2 | SYSTOLIC BLOOD PRESSURE: 100 MMHG | HEART RATE: 116 BPM

## 2020-01-09 DIAGNOSIS — K51.018 ULCERATIVE PANCOLITIS WITH OTHER COMPLICATION (HCC): ICD-10-CM

## 2020-01-09 PROCEDURE — 99213 OFFICE O/P EST LOW 20 MIN: CPT | Performed by: NURSE PRACTITIONER

## 2020-01-09 RX ORDER — SULFASALAZINE 500 MG/1
1000 TABLET, DELAYED RELEASE ORAL 3 TIMES DAILY
Qty: 180 TABLET | Refills: 5 | Status: SHIPPED | OUTPATIENT
Start: 2020-01-09 | End: 2020-01-31 | Stop reason: SDUPTHER

## 2020-01-09 NOTE — OUTREACH NOTE
Medical Week 3 Survey      Responses   Facility patient discharged from?  [place]   Does the patient have one of the following disease processes/diagnoses(primary or secondary)?  Other   Week 3 attempt successful?  Yes   Call start time  0900   Call end time  0905   Discharge diagnosis  Ulcerative colitis,    Intractable diarrhea    Is patient permission given to speak with other caregiver?  No   Meds reviewed with patient/caregiver?  Yes   Is the patient taking all medications as directed (includes completed medication regime)?  Yes   Has the patient kept scheduled appointments due by today?  Yes   Has home health visited the patient within 72 hours of discharge?  N/A   What is the patient's perception of their health status since discharge?  Improving   Week 3 Call Completed?  Yes   Wrap up additional comments  Stool is pudding consistency. No pain or bleeding.  One of his medications, could not understand what he said, is not in stock at several pharmacies, trouble receiving supply from . He is going to be out today but has appt today and will discuss with the doctor.           Deja Guardado RN

## 2020-01-09 NOTE — PATIENT INSTRUCTIONS
Loperamide tablets or capsules  What is this medicine?  LOPERAMIDE (yadira PER a mide) is used to treat diarrhea.  This medicine may be used for other purposes; ask your health care provider or pharmacist if you have questions.  COMMON BRAND NAME(S): Anti-Diarrheal, Imodium A-D, K-Pek II  What should I tell my health care provider before I take this medicine?  They need to know if you have any of these conditions:  -a black or bloody stool  -bacterial food poisoning  -colitis or mucus in your stool  -currently taking an antibiotic medication for an infection  -fever  -history of irregular heartbeat  -liver disease  -severe abdominal pain, swelling or bulging  -an unusual or allergic reaction to loperamide, other medicines, foods, dyes, or preservatives  -pregnant or trying to get pregnant  -breast-feeding  How should I use this medicine?  Take this medicine by mouth with a glass of water. Follow the directions on the prescription label. Take your doses at regular intervals. Do not take your medicine more often than directed.  Talk to your pediatrician regarding the use of this medicine in children. Special care may be needed.  Overdosage: If you think you have taken too much of this medicine contact a poison control center or emergency room at once.  NOTE: This medicine is only for you. Do not share this medicine with others.  What if I miss a dose?  This does not apply. This medicine is not for regular use. Only take this medicine while you continue to have loose bowel movements. Do not take more medicine than recommended by the packaging label or by your healthcare professional.  What may interact with this medicine?  Do not take this medicine with any of the following medications:  -  alosetron  This medicine may also interact with the following medications:  -cimetidine  -clarithromycin  -erythromycin  -gemfibrozil  -itraconazole  -ketoconazole  -quinidine  -quinine  -ranitidine  -ritonavir  -saquinavir  This list  may not describe all possible interactions. Give your health care provider a list of all the medicines, herbs, non-prescription drugs, or dietary supplements you use. Also tell them if you smoke, drink alcohol, or use illegal drugs. Some items may interact with your medicine.  What should I watch for while using this medicine?  Do not take this medicine for more than 2 days without asking your doctor or health care professional. Do not use doses higher than those prescribed by your doctor or listed on the label. Check with your doctor or health care professional right away if you develop a fever, severe abdominal pain, swelling or bulging, or if you have have bloody/black diarrhea or stools.  You may get drowsy or dizzy. Do not drive, use machinery, or do anything that needs mental alertness until you know how this medicine affects you. Do not stand or sit up quickly, especially if you are an older patient. This reduces the risk of dizzy or fainting spells. Alcohol can increase possible drowsiness and dizziness. Avoid alcoholic drinks.  Your mouth may get dry. Chewing sugarless gum or sucking hard candy, and drinking plenty of water may help. Contact your doctor if the problem does not go away or is severe. Drinking plenty of water can also help prevent dehydration that can occur with diarrhea.  Elderly patients may have a more variable response to the effects of this medicine, and are more susceptible to the effects of dehydration.  What side effects may I notice from receiving this medicine?  Side effects that you should report to your doctor or health care professional as soon as possible:  -  allergic reactions like skin rash, itching or hives, swelling of the face, lips, or tongue  -bloated, swollen feeling in your abdomen  -blurred vision  -loss of appetite  -signs and symptoms of a dangerous change in heartbeat or heart rhythm like chest pain; dizziness; fast or irregular heartbeat palpitations; feeling faint  or lightheaded, falls; breathing problems  -stomach pain  Side effects that usually do not require medical attention (report to your doctor or health care professional if they continue or are bothersome):  -  constipation  -drowsiness or dizziness  -dry mouth  -nausea, vomiting  This list may not describe all possible side effects. Call your doctor for medical advice about side effects. You may report side effects to FDA at 1-072-MXF-9016.  Where should I keep my medicine?  Keep out of the reach of children.  Store at room temperature between 15 and 25 degrees C (59 and 77 degrees F). Keep container tightly closed. Throw away any unused medicine after the expiration date.  NOTE: This sheet is a summary. It may not cover all possible information. If you have questions about this medicine, talk to your doctor, pharmacist, or health care provider.  © 2019 [employer] (2019-03-11 11:30:48)

## 2020-01-09 NOTE — PROGRESS NOTES
Chief Complaint   Patient presents with   • Ulcerative Colitis     He was discharged from the hospital 12/22 and he is still having sx. He states the diarrhea is not as constant but he still has loose stools       Subjective   Rafa Dan is a 49 y.o. male    History of Present Illness    Date of Admission: 12/13/2019 12:48 PM  Date of Discharge:  12/22/19    Presenting Problem:   Ulcerative colitis (CMS/HCC) [K51.90]           Active Hospital Problems     Diagnosis   POA   • **Ulcerative colitis (CMS/HCC) [K51.90]   Yes   • Severe chronic malnutrition (CMS/HCC) [E43]   Yes   • Intractable diarrhea [R19.7]   Yes   • Unintentional weight loss [R63.4]   Yes   • Thrombocytosis (CMS/HCC) [D47.3]   Yes   • Iron deficiency anemia [D50.9]   Yes       Resolved Hospital Problems     Diagnosis Date Resolved POA   • Hyponatremia [E87.1] 12/22/2019 Yes   • Lactic acidosis [E87.2] 12/22/2019 Yes         Hospital Course:  Rafa Dan is a 49 y.o. male with a past medical history of prior tobacco abuse and ulcerative colitis (diagnosed 8/2019) who was admitted on 12/13/2019 with a several week history of diarrhea, up to 30 episodes per day, lactic acidosis, and unintentional weight loss.  Patient also reported intermittent hematochezia but no fevers or chills.  Patient was on sulfasalazine prior to admissions but had had a lapse in treatment due to issue with refill.  C. difficile toxin and GI PCR panel were negative. Fecal leukocytes were positive and CT abd/pelvis showed inflammatory colitis, all supportive of acute flare of ulcerative colitis. Patient was placed on IV steroids, GI was consulted and he had slow clinical progress with copious diarrhea throughout hospitalization. He did not require IV antibiotics (positive blood culture was a contaminant).  He clinically improved and was able to be weaned to PO steroids prior to discharge with plans for taper. He had significant weight loss of 45lbs over past  year and was diagnosed with severe chronic malnutrition. Dietician followed throughout hospitalization and pt had excellent oral nutrition. He received his first dose of Humira on 12/19. He will have his next dose in 2 weeks and will have close outpt f/u with GI on 1/3/2020.  He will resume folic acid, vitamin D, and Iron at discharge. He was prescribed PPI for GI prophylaxis while on steroids.      Due to acute blood loss anemia from GI blood loss, pt received a total of 2 units PRBCs this admission. His Hgb was stable prior to discharge.         Discharge Follow Up Recommendations for labs/diagnostics:  1. Follow up with PCP in 1 week.   2. Follow up in [facility] [place], [place] GI clinic on 1/3/2020 as scheduled.  3. Take 40 mg of Prednisone daily x 7 days then change to 30mg daily until 1/3/20 appointment at which time further instructions on taper can be given.        1/9/2020-patient today for follow-up from hospitalization as above.  He does state he still has some diarrhea but he denies any further blood in his bowels at all.  He also denies any current fevers.  He is currently still on steroids for about 3 more days.  His H&H on 12/14/2019 was down to 7.3 and 24.6.  Did receive some packed cells while in the hospital and on 12/21/2019 his H&H was 9.9 and 33.5.  He does need a refill on his sulfasalazine.  Patient has seen GI since being out of the hospital has appointment later in the month.    No Known Allergies  Past Medical History:   Diagnosis Date   • Colitis    • Ulcerative colitis (CMS/HCC)       Past Surgical History:   Procedure Laterality Date   • COLONOSCOPY N/A 10/7/2019    Procedure: COLONOSCOPY;  Surgeon: Brunner, Mark I, MD;  Location: [facility] ENDOSCOPY;  Service: Gastroenterology     Social History     Socioeconomic History   • Marital status: Single     Spouse name: Not on file   • Number of children: Not on file   • Years of education: Not on file   • Highest education level: Not on file   Tobacco Use   •  Smoking status: Former Smoker     Packs/day: 0.00     Years: 15.00     Pack years: 0.00   • Smokeless tobacco: Never Used   • Tobacco comment: quit 4 yearse ago    Substance and Sexual Activity   • Alcohol use: No     Frequency: Never   • Drug use: Yes     Frequency: 7.0 times per week     Types: Marijuana     Comment: Stopped 4 years ago.        Current Outpatient Medications:   •  ferrous sulfate 325 (65 FE) MG tablet, Take 2 tablets by mouth 2 (Two) Times a Day. Take with OTC Vitamin C 500 mg 1 hour before or 2 hours after meals to enhance absorption, Disp: 120 tablet, Rfl: 2  •  folic acid (FOLVITE) 1 MG tablet, Take 1 mg by mouth Daily., Disp: , Rfl:   •  predniSONE (DELTASONE) 10 MG tablet, Take 4 tabs daily x 7 days, then 3 tabs daily until otherwise instructed, Disp: 60 tablet, Rfl: 1  •  Probiotic Product (RISAQUAD-2) capsule capsule, Take 1 capsule by mouth Daily., Disp: , Rfl:   •  sulfaSALAzine (AZULFIDINE) 500 MG EC tablet, Take 2 tablets by mouth 3 (Three) Times a Day., Disp: 180 tablet, Rfl: 5  •  vitamin C (ASCORBIC ACID) 500 MG tablet, Take 1 tablet by mouth 2 (Two) Times a Day., Disp: 120 tablet, Rfl: 2  •  Cyanocobalamin 1000 MCG sublingual tablet, Place 1 tablet under the tongue Daily., Disp: 90 each, Rfl: 3  •  pantoprazole (PROTONIX) 40 MG EC tablet, Take 1 tablet by mouth Daily., Disp: 30 tablet, Rfl: 1  •  vitamin D (ERGOCALCIFEROL) 1.25 MG (35799 UT) capsule capsule, Take 1 capsule by mouth 1 (One) Time Per Week for 8 doses., Disp: 8 capsule, Rfl: 0     Review of Systems   Constitutional: Negative for chills, fatigue, fever and unexpected weight change.   Respiratory: Negative for cough, chest tightness, shortness of breath and wheezing.    Cardiovascular: Negative for chest pain, palpitations and leg swelling.   Gastrointestinal: Positive for diarrhea ( Still 8-10 bowel movements per day.). Negative for abdominal pain, constipation, nausea and vomiting.   Genitourinary: Negative for  difficulty urinating and dysuria.   Skin: Negative for color change and rash.   Neurological: Negative for dizziness, syncope, weakness and headaches.   Psychiatric/Behavioral: Negative for sleep disturbance.       Objective     Vitals:    01/09/20 1044   BP: 100/60   Pulse: 116   Temp: 97.4 °F (36.3 °C)   SpO2: 98%       Results for orders placed or performed during the hospital encounter of 12/13/19   Blood Culture - Blood, Arm, Right   Result Value Ref Range    Blood Culture Staphylococcus, coagulase negative (A)     Isolated from Aerobic Bottle     Gram Stain Aerobic Bottle Gram positive cocci in clusters    Blood Culture - Blood, Arm, Right   Result Value Ref Range    Blood Culture No growth at 5 days    Gastrointestinal Panel, PCR - Stool, Per Rectum   Result Value Ref Range    Campylobacter Not Detected Not Detected, Invalid    Plesiomonas shigelloides Not Detected Not Detected    Salmonella Not Detected Not Detected    Vibrio Not Detected Not Detected    Vibrio cholerae Not Detected Not Detected    Yersinia enterocolitica Not Detected Not Detected    Enteroaggregative E. coli (EAEC) Not Detected Not Detected    Enteropathogenic E. coli (EPEC) Not Detected Not Detected    Enterotoxigenic E. coli (ETEC) lt/st Not Detected Not Detected    Shiga-like toxin-producing E. coli (STEC) stx1/stx2 Not Detected Not Detected    E. coli O157 Not Detected Not Detected    Shigella/Enteroinvasive E. coli (EIEC) Not Detected Not Detected    Cryptosporidium Not Detected Not Detected, Invalid    Cyclospora cayetanensis Not Detected Not Detected    Entamoeba histolytica Not Detected Not Detected    Giardia lamblia Not Detected Not Detected    Adenovirus F40/41 Not Detected Not Detected    Astrovirus Not Detected Not Detected    Norovirus GI/GII Not Detected Not Detected    Rotavirus A Not Detected Not Detected    Sapovirus (I, II, IV or V) Not Detected Not Detected   Fecal Leukocytes - Stool, Per Rectum   Result Value Ref Range     Fecal Leukocytes Many (4+) WBC's Seen (A) No WBC's Seen   Clostridium Difficile Toxin, PCR - Stool, Per Rectum   Result Value Ref Range    C. Difficile Toxins by PCR Not Detected Not Detected   Blood Culture ID, PCR - Blood, Arm, Right   Result Value Ref Range    BCID, PCR (C) No organism detected by BCID PCR.     Staphylococcus spp, not aureus. Identification by BCID PCR.   Blood Culture - Blood, Arm, Right   Result Value Ref Range    Blood Culture No growth at 5 days    Blood Culture - Blood, Hand, Right   Result Value Ref Range    Blood Culture No growth at 5 days    Comprehensive Metabolic Panel   Result Value Ref Range    Glucose 117 (H) 65 - 99 mg/dL    BUN 13 6 - 20 mg/dL    Creatinine 1.09 0.76 - 1.27 mg/dL    Sodium 130 (L) 136 - 145 mmol/L    Potassium 4.2 3.5 - 5.2 mmol/L    Chloride 95 (L) 98 - 107 mmol/L    CO2 19.0 (L) 22.0 - 29.0 mmol/L    Calcium 7.9 (L) 8.6 - 10.5 mg/dL    Total Protein 5.8 (L) 6.0 - 8.5 g/dL    Albumin 1.60 (L) 3.50 - 5.20 g/dL    ALT (SGPT) 23 1 - 41 U/L    AST (SGOT) 35 1 - 40 U/L    Alkaline Phosphatase 152 (H) 39 - 117 U/L    Total Bilirubin 0.4 0.2 - 1.2 mg/dL    eGFR Non African Amer 71 >60 mL/min/1.73    Globulin 4.2 gm/dL    A/G Ratio 0.4 g/dL    BUN/Creatinine Ratio 11.9 7.0 - 25.0    Anion Gap 16.0 (H) 5.0 - 15.0 mmol/L   Protime-INR   Result Value Ref Range    Protime 15.4 (H) 11.2 - 14.3 Seconds    INR 1.28 (H) 0.85 - 1.16   aPTT   Result Value Ref Range    PTT 33.4 24.0 - 37.0 seconds   Magnesium   Result Value Ref Range    Magnesium 1.5 (L) 1.6 - 2.6 mg/dL   Phosphorus   Result Value Ref Range    Phosphorus 4.2 2.5 - 4.5 mg/dL   Calcium, Ionized   Result Value Ref Range    Ionized Calcium 1.14 1.12 - 1.32 mmol/L   Lactic Acid, Plasma   Result Value Ref Range    Lactate 5.1 (C) 0.5 - 2.0 mmol/L   C-reactive Protein   Result Value Ref Range    C-Reactive Protein 17.26 (H) 0.00 - 0.50 mg/dL   CBC Auto Differential   Result Value Ref Range    WBC 7.61 3.40 - 10.80  10*3/mm3    RBC 3.62 (L) 4.14 - 5.80 10*6/mm3    Hemoglobin 8.2 (L) 13.0 - 17.7 g/dL    Hematocrit 28.6 (L) 37.5 - 51.0 %    MCV 79.0 79.0 - 97.0 fL    MCH 22.7 (L) 26.6 - 33.0 pg    MCHC 28.7 (L) 31.5 - 35.7 g/dL    RDW 19.0 (H) 12.3 - 15.4 %    RDW-SD 53.8 37.0 - 54.0 fl    MPV 9.0 6.0 - 12.0 fL    Platelets 547 (H) 140 - 450 10*3/mm3    Neutrophil % 79.4 (H) 42.7 - 76.0 %    Lymphocyte % 13.4 (L) 19.6 - 45.3 %    Monocyte % 4.7 (L) 5.0 - 12.0 %    Eosinophil % 0.1 (L) 0.3 - 6.2 %    Basophil % 0.3 0.0 - 1.5 %    Immature Grans % 2.1 (H) 0.0 - 0.5 %    Neutrophils, Absolute 6.04 1.70 - 7.00 10*3/mm3    Lymphocytes, Absolute 1.02 0.70 - 3.10 10*3/mm3    Monocytes, Absolute 0.36 0.10 - 0.90 10*3/mm3    Eosinophils, Absolute 0.01 0.00 - 0.40 10*3/mm3    Basophils, Absolute 0.02 0.00 - 0.20 10*3/mm3    Immature Grans, Absolute 0.16 (H) 0.00 - 0.05 10*3/mm3    nRBC 0.0 0.0 - 0.2 /100 WBC   Scan Slide   Result Value Ref Range    Ovalocytes Slight/1+ None Seen    WBC Morphology Normal Normal    Platelet Morphology Normal Normal   Lactic Acid, Reflex Timer (This will reflex a repeat order 3-3:15 hours after ordered.)   Result Value Ref Range    Extra Tube Hold for add-ons.    Calprotectin, Fecal - Stool, Per Rectum   Result Value Ref Range    Calprotectin, Fecal 880 (H) 0 - 120 ug/g   Fecal Lactoferrin - Stool, Per Rectum   Result Value Ref Range    Lactoferrin, Qual Positive (A) Negative   Blood Gas, Arterial With Co-Ox   Result Value Ref Range    Site Right Radial     Horacio's Test N/A     pH, Arterial 7.465 (H) 7.350 - 7.450 pH units    pCO2, Arterial 27.1 mm Hg    pO2, Arterial 108.0 83.0 - 108.0 mm Hg    HCO3, Arterial 19.5 (L) 20.0 - 26.0 mmol/L    Base Excess, Arterial -3.8 (L) 0.0 - 2.0 mmol/L    Hemoglobin, Blood Gas 6.8 (C) 13.5 - 17.5 g/dL    Hematocrit, Blood Gas 20.9 %    Oxyhemoglobin 96.4 94 - 99 %    Methemoglobin 1.00 0.00 - 1.50 %    Carboxyhemoglobin 1.3 0 - 2 %    CO2 Content 20.3 (L) 22 - 33 mmol/L     Temperature 37.0 C    Barometric Pressure for Blood Gas      Modality Room Air     FIO2 21 %    Ventilator Mode       Note      Notified Who C. BOARD RN     Notified By [id number]     Notified Time 12/13/2019 14:43     pH, Temp Corrected 7.465 pH Units    pCO2, Temperature Corrected 27.1 (L) 35 - 48 mm Hg    pO2, Temperature Corrected 108 83 - 108 mm Hg   Histoplasma Ag Ur - Urine, Clean Catch   Result Value Ref Range    Histoplasma Galactomannan Ag Ur <0.5 <0.5 ng/mL    Disclaimer: Comment    Procalcitonin   Result Value Ref Range    Procalcitonin 0.54 (H) 0.10 - 0.25 ng/mL   Iron Profile   Result Value Ref Range    Iron 12 (L) 59 - 158 mcg/dL    Iron Saturation 8 (L) 20 - 50 %    Transferrin 100 (L) 200 - 360 mg/dL    TIBC 149 (L) 298 - 536 mcg/dL   Ferritin   Result Value Ref Range    Ferritin 61.26 30.00 - 400.00 ng/mL   Urinalysis With Culture If Indicated - Urine, Clean Catch   Result Value Ref Range    Color, UA Yellow Yellow, Straw    Appearance, UA Clear Clear    pH, UA 6.0 5.0 - 8.0    Specific Gravity, UA 1.023 1.001 - 1.030    Glucose, UA Negative Negative    Ketones, UA Negative Negative    Bilirubin, UA Negative Negative    Blood, UA Negative Negative    Protein, UA 30 mg/dL (1+) (A) Negative    Leuk Esterase, UA Negative Negative    Nitrite, UA Negative Negative    Urobilinogen, UA 0.2 E.U./dL 0.2 - 1.0 E.U./dL   Urinalysis, Microscopic Only - Urine, Clean Catch   Result Value Ref Range    RBC, UA 0-2 None Seen, 0-2 /HPF    WBC, UA 0-2 None Seen, 0-2 /HPF    Bacteria, UA None Seen None Seen, Trace /HPF    Squamous Epithelial Cells, UA 0-2 None Seen, 0-2 /HPF    Hyaline Casts, UA 7-12 0 - 6 /LPF    Methodology Automated Microscopy    Lactic Acid, Reflex   Result Value Ref Range    Lactate 2.2 (C) 0.5 - 2.0 mmol/L   Hemoglobin & Hematocrit, Blood   Result Value Ref Range    Hemoglobin 7.4 (L) 13.0 - 17.7 g/dL    Hematocrit 26.1 (L) 37.5 - 51.0 %   Lactic Acid, Plasma   Result Value Ref Range    Lactate 1.1  0.5 - 2.0 mmol/L   Basic Metabolic Panel   Result Value Ref Range    Glucose 140 (H) 65 - 99 mg/dL    BUN 11 6 - 20 mg/dL    Creatinine 0.84 0.76 - 1.27 mg/dL    Sodium 131 (L) 136 - 145 mmol/L    Potassium 4.2 3.5 - 5.2 mmol/L    Chloride 102 98 - 107 mmol/L    CO2 21.0 (L) 22.0 - 29.0 mmol/L    Calcium 6.9 (L) 8.6 - 10.5 mg/dL    eGFR Non African Amer 96 >60 mL/min/1.73    BUN/Creatinine Ratio 13.1 7.0 - 25.0    Anion Gap 8.0 5.0 - 15.0 mmol/L   CBC (No Diff)   Result Value Ref Range    WBC 4.99 3.40 - 10.80 10*3/mm3    RBC 3.02 (L) 4.14 - 5.80 10*6/mm3    Hemoglobin 7.3 (L) 13.0 - 17.7 g/dL    Hematocrit 24.6 (L) 37.5 - 51.0 %    MCV 81.5 79.0 - 97.0 fL    MCH 24.2 (L) 26.6 - 33.0 pg    MCHC 29.7 (L) 31.5 - 35.7 g/dL    RDW 18.2 (H) 12.3 - 15.4 %    RDW-SD 52.9 37.0 - 54.0 fl    MPV 9.4 6.0 - 12.0 fL    Platelets 402 140 - 450 10*3/mm3   Hemoglobin A1c   Result Value Ref Range    Hemoglobin A1C 5.10 4.80 - 5.60 %   Lipid Panel   Result Value Ref Range    Total Cholesterol 56 0 - 200 mg/dL    Triglycerides 38 0 - 150 mg/dL    HDL Cholesterol 32 (L) 40 - 60 mg/dL    LDL Cholesterol  16 0 - 100 mg/dL    VLDL Cholesterol 7.6 mg/dL    LDL/HDL Ratio 0.51    TSH   Result Value Ref Range    TSH 1.360 0.270 - 4.200 uIU/mL   Hemoglobin & Hematocrit, Blood   Result Value Ref Range    Hemoglobin 11.1 (L) 13.0 - 17.7 g/dL    Hematocrit 35.4 (L) 37.5 - 51.0 %   Hemoglobin & Hematocrit, Blood   Result Value Ref Range    Hemoglobin 9.9 (L) 13.0 - 17.7 g/dL    Hematocrit 31.0 (L) 37.5 - 51.0 %   Basic Metabolic Panel   Result Value Ref Range    Glucose 127 (H) 65 - 99 mg/dL    BUN 12 6 - 20 mg/dL    Creatinine 0.84 0.76 - 1.27 mg/dL    Sodium 131 (L) 136 - 145 mmol/L    Potassium 4.7 3.5 - 5.2 mmol/L    Chloride 105 98 - 107 mmol/L    CO2 20.0 (L) 22.0 - 29.0 mmol/L    Calcium 6.8 (L) 8.6 - 10.5 mg/dL    eGFR Non African Amer 96 >60 mL/min/1.73    BUN/Creatinine Ratio 14.3 7.0 - 25.0    Anion Gap 6.0 5.0 - 15.0 mmol/L   CBC (No  Diff)   Result Value Ref Range    WBC 8.39 3.40 - 10.80 10*3/mm3    RBC 3.70 (L) 4.14 - 5.80 10*6/mm3    Hemoglobin 9.3 (L) 13.0 - 17.7 g/dL    Hematocrit 30.2 (L) 37.5 - 51.0 %    MCV 81.6 79.0 - 97.0 fL    MCH 25.1 (L) 26.6 - 33.0 pg    MCHC 30.8 (L) 31.5 - 35.7 g/dL    RDW 17.2 (H) 12.3 - 15.4 %    RDW-SD 50.7 37.0 - 54.0 fl    MPV 8.8 6.0 - 12.0 fL    Platelets 265 140 - 450 10*3/mm3   Magnesium   Result Value Ref Range    Magnesium 2.4 1.6 - 2.6 mg/dL   Calcium, Ionized   Result Value Ref Range    Ionized Calcium 1.10 (L) 1.12 - 1.32 mmol/L   Hemoglobin & Hematocrit, Blood   Result Value Ref Range    Hemoglobin 9.5 (L) 13.0 - 17.7 g/dL    Hematocrit 30.8 (L) 37.5 - 51.0 %   Hemoglobin & Hematocrit, Blood   Result Value Ref Range    Hemoglobin 9.2 (L) 13.0 - 17.7 g/dL    Hematocrit 30.3 (L) 37.5 - 51.0 %   Basic Metabolic Panel   Result Value Ref Range    Glucose 112 (H) 65 - 99 mg/dL    BUN 11 6 - 20 mg/dL    Creatinine 0.61 (L) 0.76 - 1.27 mg/dL    Sodium 131 (L) 136 - 145 mmol/L    Potassium 4.7 3.5 - 5.2 mmol/L    Chloride 101 98 - 107 mmol/L    CO2 24.0 22.0 - 29.0 mmol/L    Calcium 7.0 (L) 8.6 - 10.5 mg/dL    eGFR Non African Amer 139 >60 mL/min/1.73    BUN/Creatinine Ratio 18.0 7.0 - 25.0    Anion Gap 6.0 5.0 - 15.0 mmol/L   CBC (No Diff)   Result Value Ref Range    WBC 10.91 (H) 3.40 - 10.80 10*3/mm3    RBC 3.71 (L) 4.14 - 5.80 10*6/mm3    Hemoglobin 9.5 (L) 13.0 - 17.7 g/dL    Hematocrit 31.1 (L) 37.5 - 51.0 %    MCV 83.8 79.0 - 97.0 fL    MCH 25.6 (L) 26.6 - 33.0 pg    MCHC 30.5 (L) 31.5 - 35.7 g/dL    RDW 17.5 (H) 12.3 - 15.4 %    RDW-SD 53.1 37.0 - 54.0 fl    MPV 8.7 6.0 - 12.0 fL    Platelets 250 140 - 450 10*3/mm3   Basic Metabolic Panel   Result Value Ref Range    Glucose 97 65 - 99 mg/dL    BUN 14 6 - 20 mg/dL    Creatinine 0.61 (L) 0.76 - 1.27 mg/dL    Sodium 133 (L) 136 - 145 mmol/L    Potassium 4.7 3.5 - 5.2 mmol/L    Chloride 102 98 - 107 mmol/L    CO2 25.0 22.0 - 29.0 mmol/L    Calcium 7.4  (L) 8.6 - 10.5 mg/dL    eGFR Non African Amer 139 >60 mL/min/1.73    BUN/Creatinine Ratio 23.0 7.0 - 25.0    Anion Gap 6.0 5.0 - 15.0 mmol/L   CBC (No Diff)   Result Value Ref Range    WBC 14.85 (H) 3.40 - 10.80 10*3/mm3    RBC 3.77 (L) 4.14 - 5.80 10*6/mm3    Hemoglobin 9.7 (L) 13.0 - 17.7 g/dL    Hematocrit 31.5 (L) 37.5 - 51.0 %    MCV 83.6 79.0 - 97.0 fL    MCH 25.7 (L) 26.6 - 33.0 pg    MCHC 30.8 (L) 31.5 - 35.7 g/dL    RDW 18.1 (H) 12.3 - 15.4 %    RDW-SD 53.3 37.0 - 54.0 fl    MPV 9.3 6.0 - 12.0 fL    Platelets 266 140 - 450 10*3/mm3   CBC (No Diff)   Result Value Ref Range    WBC 14.30 (H) 3.40 - 10.80 10*3/mm3    RBC 3.76 (L) 4.14 - 5.80 10*6/mm3    Hemoglobin 9.8 (L) 13.0 - 17.7 g/dL    Hematocrit 32.6 (L) 37.5 - 51.0 %    MCV 86.7 79.0 - 97.0 fL    MCH 26.1 (L) 26.6 - 33.0 pg    MCHC 30.1 (L) 31.5 - 35.7 g/dL    RDW 18.9 (H) 12.3 - 15.4 %    RDW-SD 56.4 (H) 37.0 - 54.0 fl    MPV 9.3 6.0 - 12.0 fL    Platelets 265 140 - 450 10*3/mm3   Comprehensive Metabolic Panel   Result Value Ref Range    Glucose 93 65 - 99 mg/dL    BUN 15 6 - 20 mg/dL    Creatinine 0.54 (L) 0.76 - 1.27 mg/dL    Sodium 134 (L) 136 - 145 mmol/L    Potassium 4.8 3.5 - 5.2 mmol/L    Chloride 102 98 - 107 mmol/L    CO2 27.0 22.0 - 29.0 mmol/L    Calcium 7.5 (L) 8.6 - 10.5 mg/dL    Total Protein 4.9 (L) 6.0 - 8.5 g/dL    Albumin 1.90 (L) 3.50 - 5.20 g/dL    ALT (SGPT) 39 1 - 41 U/L    AST (SGOT) 50 (H) 1 - 40 U/L    Alkaline Phosphatase 199 (H) 39 - 117 U/L    Total Bilirubin 0.2 0.2 - 1.2 mg/dL    eGFR Non African Amer >150 >60 mL/min/1.73    Globulin 3.0 gm/dL    A/G Ratio 0.6 g/dL    BUN/Creatinine Ratio 27.8 (H) 7.0 - 25.0    Anion Gap 5.0 5.0 - 15.0 mmol/L   Magnesium   Result Value Ref Range    Magnesium 1.8 1.6 - 2.6 mg/dL   Vitamin D 25 Hydroxy   Result Value Ref Range    25 Hydroxy, Vitamin D 24.6 (L) 30.0 - 100.0 ng/ml   Magnesium   Result Value Ref Range    Magnesium 1.9 1.6 - 2.6 mg/dL   Basic Metabolic Panel   Result Value Ref  Range    Glucose 89 65 - 99 mg/dL    BUN 21 (H) 6 - 20 mg/dL    Creatinine 0.48 (L) 0.76 - 1.27 mg/dL    Sodium 134 (L) 136 - 145 mmol/L    Potassium 3.9 3.5 - 5.2 mmol/L    Chloride 102 98 - 107 mmol/L    CO2 23.0 22.0 - 29.0 mmol/L    Calcium 6.9 (L) 8.6 - 10.5 mg/dL    eGFR Non African Amer >150 >60 mL/min/1.73    BUN/Creatinine Ratio 43.8 (H) 7.0 - 25.0    Anion Gap 9.0 5.0 - 15.0 mmol/L   CBC (No Diff)   Result Value Ref Range    WBC 12.79 (H) 3.40 - 10.80 10*3/mm3    RBC 3.40 (L) 4.14 - 5.80 10*6/mm3    Hemoglobin 9.1 (L) 13.0 - 17.7 g/dL    Hematocrit 30.0 (L) 37.5 - 51.0 %    MCV 88.2 79.0 - 97.0 fL    MCH 26.8 26.6 - 33.0 pg    MCHC 30.3 (L) 31.5 - 35.7 g/dL    RDW 19.9 (H) 12.3 - 15.4 %    RDW-SD 60.9 (H) 37.0 - 54.0 fl    MPV 9.3 6.0 - 12.0 fL    Platelets 291 140 - 450 10*3/mm3   Magnesium   Result Value Ref Range    Magnesium 2.4 1.6 - 2.6 mg/dL   Renal Function Panel   Result Value Ref Range    Glucose 113 (H) 65 - 99 mg/dL    BUN 24 (H) 6 - 20 mg/dL    Creatinine 0.46 (L) 0.76 - 1.27 mg/dL    Sodium 135 (L) 136 - 145 mmol/L    Potassium 4.1 3.5 - 5.2 mmol/L    Chloride 106 98 - 107 mmol/L    CO2 21.0 (L) 22.0 - 29.0 mmol/L    Calcium 7.3 (L) 8.6 - 10.5 mg/dL    Albumin 1.80 (L) 3.50 - 5.20 g/dL    Phosphorus 1.9 (C) 2.5 - 4.5 mg/dL    Anion Gap 8.0 5.0 - 15.0 mmol/L    BUN/Creatinine Ratio 52.2 (H) 7.0 - 25.0    eGFR Non African Amer >150 >60 mL/min/1.73   CBC (No Diff)   Result Value Ref Range    WBC 11.38 (H) 3.40 - 10.80 10*3/mm3    RBC 3.80 (L) 4.14 - 5.80 10*6/mm3    Hemoglobin 9.9 (L) 13.0 - 17.7 g/dL    Hematocrit 33.5 (L) 37.5 - 51.0 %    MCV 88.2 79.0 - 97.0 fL    MCH 26.1 (L) 26.6 - 33.0 pg    MCHC 29.6 (L) 31.5 - 35.7 g/dL    RDW 20.7 (H) 12.3 - 15.4 %    RDW-SD 62.6 (H) 37.0 - 54.0 fl    MPV 9.5 6.0 - 12.0 fL    Platelets 316 140 - 450 10*3/mm3   Basic Metabolic Panel   Result Value Ref Range    Glucose 79 65 - 99 mg/dL    BUN 12 6 - 20 mg/dL    Creatinine 0.53 (L) 0.76 - 1.27 mg/dL     Sodium 136 136 - 145 mmol/L    Potassium 3.7 3.5 - 5.2 mmol/L    Chloride 105 98 - 107 mmol/L    CO2 23.0 22.0 - 29.0 mmol/L    Calcium 7.6 (L) 8.6 - 10.5 mg/dL    eGFR Non African Amer >150 >60 mL/min/1.73    BUN/Creatinine Ratio 22.6 7.0 - 25.0    Anion Gap 8.0 5.0 - 15.0 mmol/L   POC CHEM 8   Result Value Ref Range    Glucose 105 70 - 130 mg/dL    BUN 11 8 - 26 mg/dL    Creatinine 1.00 0.60 - 1.30 mg/dL    Sodium 134 (L) 138 - 146 mmol/L    POC Potassium 3.8 3.5 - 4.9 mmol/L    Chloride 102 98 - 109 mmol/L    Total CO2 20 (L) 24 - 29 mmol/L    Hemoglobin 7.1 (L) 12.0 - 17.0 g/dL    Hematocrit 21 (L) 38 - 51 %    Ionized Calcium 1.05 (L) 1.20 - 1.32 mmol/L   Type & Screen   Result Value Ref Range    ABO Type A     RH type Positive     Antibody Screen Negative     T&S Expiration Date 12/16/2019 11:59:59 PM    Prepare RBC, 1 Units   Result Value Ref Range    Product Code V7603N90     Unit Number [id number]     UNIT  ABO A     UNIT  RH POS     Dispense Status PT     Blood Type APOS     Blood Expiration Date 201912252359     Blood Type Barcode 6200    Prepare RBC, 1 Units   Result Value Ref Range    Product Code D8901U61     Unit Number [id number]     UNIT  ABO A     UNIT  RH POS     Dispense Status PT     Blood Type APOS     Blood Expiration Date 201912252359     Blood Type Barcode 6200    Prepare RBC, 1 Units   Result Value Ref Range    Product Code R7299P39     Unit Number [id number]     UNIT  ABO A     UNIT  RH POS     Dispense Status RE     Blood Type APOS     Blood Expiration Date 201912302359     Blood Type Barcode 6200     Product Code L5216N56     Unit Number [id number]     UNIT  ABO A     UNIT  RH POS     Dispense Status PT     Blood Type APOS     Blood Expiration Date 201912302359     Blood Type Barcode 6200    Light Blue Top   Result Value Ref Range    Extra Tube hold for add-on    Green Top (Gel)   Result Value Ref Range    Extra Tube Hold for add-ons.    Lavender Top   Result Value  Ref Range    Extra Tube hold for add-on        Physical Exam   Constitutional: He is oriented to person, place, and time. He appears well-developed and well-nourished.   HENT:   Head: Normocephalic and atraumatic.   Cardiovascular: Normal rate, regular rhythm and normal heart sounds.   Pulmonary/Chest: Effort normal and breath sounds normal.   Musculoskeletal: He exhibits no edema.   Neurological: He is alert and oriented to person, place, and time.   Skin: Skin is warm and dry.   Psychiatric: He has a normal mood and affect. His behavior is normal.   Vitals reviewed.      Assessment/Plan   Problem List Items Addressed This Visit        Digestive    Ulcerative colitis (CMS/[facility])    Relevant Medications    sulfaSALAzine (AZULFIDINE) 500 MG EC tablet      Patient was encouraged to keep me informed of any acute changes, lack of improvement, or any new concerning symptoms.  If patient becomes concerned, or has any worsening symptoms, they are to report either here, urgent treatment, or emergency room. Plan of care discussed with pt. They verbalized understanding and agreement.     RV- 3-6 mo    Counseling provided on Diarrhea.    Diego García, APRN   1/9/2020   11:29 AM

## 2020-01-20 ENCOUNTER — READMISSION MANAGEMENT (OUTPATIENT)
Dept: CALL CENTER | Facility: HOSPITAL | Age: 50
End: 2020-01-20

## 2020-01-20 NOTE — OUTREACH NOTE
Medical Week 4 Survey      Responses   Facility patient discharged from?  [facility]   Does the patient have one of the following disease processes/diagnoses(primary or secondary)?  Other   Week 4 attempt successful?  Yes   Call start time  1502   Call end time  1504   Meds reviewed with patient/caregiver?  Yes   Is the patient having any side effects they believe may be caused by any medication additions or changes?  No   Is the patient taking all medications as directed (includes completed medication regime)?  Yes   Has the patient kept scheduled appointments due by today?  Yes   Is the patient still receiving Home Health Services?  N/A   Psychosocial issues?  No   What is the patient's perception of their health status since discharge?  Improving   Is the patient/caregiver able to teach back signs and symptoms related to disease process for when to call PCP?  Yes   Is the patient/caregiver able to teach back signs and symptoms related to disease process for when to call 911?  Yes   Is the patient/caregiver able to teach back the hierarchy of who to call/visit for symptoms/problems? PCP, Specialist, Home health nurse, Urgent Care, ED, 911  Yes   Week 4 Call Completed?  Yes   Would the patient like one additional call?  No   Graduated  Yes   Wrap up additional comments  States is doing well-denies any complaints.          Tesha Olivas RN

## 2020-01-31 ENCOUNTER — OFFICE VISIT (OUTPATIENT)
Dept: GASTROENTEROLOGY | Facility: CLINIC | Age: 50
End: 2020-01-31

## 2020-01-31 VITALS
HEIGHT: 70 IN | DIASTOLIC BLOOD PRESSURE: 85 MMHG | SYSTOLIC BLOOD PRESSURE: 102 MMHG | HEART RATE: 127 BPM | BODY MASS INDEX: 17.92 KG/M2 | WEIGHT: 125.2 LBS

## 2020-01-31 DIAGNOSIS — K51.018 ULCERATIVE PANCOLITIS WITH OTHER COMPLICATION (HCC): ICD-10-CM

## 2020-01-31 PROCEDURE — 99214 OFFICE O/P EST MOD 30 MIN: CPT | Performed by: NURSE PRACTITIONER

## 2020-01-31 RX ORDER — SULFASALAZINE 500 MG/1
1000 TABLET, DELAYED RELEASE ORAL 3 TIMES DAILY
Qty: 180 TABLET | Refills: 5 | Status: SHIPPED | OUTPATIENT
Start: 2020-01-31

## 2020-01-31 RX ORDER — PREDNISONE 10 MG/1
TABLET ORAL
Qty: 100 TABLET | Refills: 0 | Status: SHIPPED | OUTPATIENT
Start: 2020-01-31 | End: 2020-03-15

## 2023-09-13 NOTE — H&P
[facility] Medicine Services  HISTORY AND PHYSICAL    Patient Name: Rafa Dan  : 1970  MRN: [id number]  Primary Care Physician: Provider, No Known  Date of admission: 10/5/2019      Subjective   Subjective     Chief Complaint:  Diarrhea    HPI:  Rafa Dan is a 49 y.o. male patient with no known medical problems who presents to the ED due to intractable diarrhea.  Patient reports onset of intractable diarrhea near the end of , or beginning of July.  He denies recent travel, drinking well water or sick contacts.  He reports bright red blood in bowel movements, loose watery stools with mucus.  Patient states that he has a bowel movement every 1-2 hours.  He is awakened throughout the night with persistent diarrhea every 1-2 hours as well.  Patient states that he has lost around 40 pounds since July.  He reports a good appetite, denies nausea, vomiting, abdominal pain.  He was seen at an outside ED toward the end of July where he was prescribed an oral antibiotic.  But, states he had not been on antibiotics for years prior to onset of symptoms.  He reports subjective fevers, night sweats new onset dry skin.    Review of Systems   Constitutional: Positive for fever and unexpected weight change.   HENT: Negative.    Eyes: Negative.    Respiratory: Negative.    Cardiovascular: Negative.    Gastrointestinal: Positive for blood in stool and diarrhea.   Endocrine: Negative.    Genitourinary: Negative.    Musculoskeletal: Negative.    Skin: Positive for rash.   Allergic/Immunologic: Negative.    Neurological: Positive for weakness.   Hematological: Negative.    Psychiatric/Behavioral: Negative.       All other systems reviewed and are negative.     Personal History     Past Medical History:   Diagnosis Date   • Colitis        History reviewed. No pertinent surgical history.    Family History: family history is not on file. Otherwise pertinent FHx was reviewed and  unremarkable.     Social History:  reports that he has never smoked. He has never used smokeless tobacco. He reports that he does not drink alcohol or use drugs.  Social History     Social History Narrative   • Not on file       Medications:    Available home medication information reviewed.  No medications prior to admission.       No Known Allergies    Objective   Objective     Vital Signs:   Temp:  [98 °F (36.7 °C)-98.3 °F (36.8 °C)] 98 °F (36.7 °C)  Heart Rate:  [] 91  Resp:  [14-16] 16  BP: ()/(65-80) 94/65        Physical Exam   Constitutional: Awake, alert, cachectic male patient  Eyes: PERRLA, sclerae anicteric, no conjunctival injection  HENT: NCAT, mucous membranes moist  Neck: Supple, no thyromegaly, no lymphadenopathy, trachea midline  Respiratory: Clear to auscultation bilaterally, nonlabored respirations   Cardiovascular: RRR, no murmurs, rubs, or gallops, palpable pedal pulses bilaterally  Gastrointestinal: Positive bowel sounds, soft, nontender, thin abdomen  Musculoskeletal: No bilateral ankle edema, no clubbing or cyanosis to extremities  Psychiatric: Appropriate affect, cooperative  Neurologic: Oriented x 3, strength symmetric in all extremities, Cranial Nerves grossly intact to confrontation, speech clear  Skin: No rashes, dry skin      Results Reviewed:  I have personally reviewed current lab and radiology data.    Results from last 7 days   Lab Units 10/05/19  1723   WBC 10*3/mm3 4.44   HEMOGLOBIN g/dL 8.2*   HEMATOCRIT % 26.9*   PLATELETS 10*3/mm3 483*     Results from last 7 days   Lab Units 10/05/19  1723   SODIUM mmol/L 132*   POTASSIUM mmol/L 3.3*   CHLORIDE mmol/L 99   CO2 mmol/L 23.0   BUN mg/dL 9   CREATININE mg/dL 0.66*   GLUCOSE mg/dL 105*   CALCIUM mg/dL 7.8*   ALT (SGPT) U/L 15   AST (SGOT) U/L 16   LACTATE mmol/L 0.8   PROCALCITONIN ng/mL 0.10     Estimated Creatinine Clearance: 101.9 mL/min (A) (by C-G formula based on SCr of 0.66 mg/dL (L)).  Brief Urine Lab Results   (Last result in the past 365 days)      Color   Clarity   Blood   Leuk Est   Nitrite   Protein   CREAT   Urine HCG        10/05/19 1848 Dark Yellow Clear Negative Negative Negative Trace             Imaging Results (last 24 hours)     Procedure Component Value Units Date/Time    CT Abdomen Pelvis Without Contrast [[id number]] Collected:  10/05/19 1917     Updated:  10/05/19 1922    Narrative:       EXAMINATION: CT ABDOMEN PELVIS WO CONTRAST-      INDICATION: severe diarrhea with abd pain     TECHNIQUE: Unenhanced CT imaging of the abdomen and pelvis was performed  with additional coronal and sagittal reformatted imaging provided for  review.     The radiation dose reduction device was turned on for each scan per the  ALARA (As Low as Reasonably Achievable) protocol.     COMPARISON: NONE     FINDINGS: Visualized lower lungs do not demonstrate evidence for acute  abnormality. The heart is unremarkable as visualized. Diffuse hepatic  steatosis is noted. Gallbladder is unremarkable in appearance. The  spleen is not demonstrate abnormality. The pancreas is unremarkable. The  adrenal glands demonstrate mild thickening bilaterally and are otherwise  nonspecific. Likely superior pole renal cyst identified. The kidneys are  symmetric without evidence of hydronephrosis. Urinary bladder is  partially decompressed and otherwise unremarkable. The prostate gland is  prominent size measuring up to 4.3 cm and demonstrates calcification.  Please note the appendix is not well visualized, although no obvious  appendicitis identified.     No free intraperitoneal air is appreciated. The stomach is partially  decompressed and otherwise unremarkable. Good filled small bowel is  identified suggesting of an enteritis with additional diffuse pan  colonic wall thickening with pericolonic inflammation identified without  convincing CT evidence of pneumatosis or portal venous gas. No air is  appreciated within the liver. No pericolonic abscess  appreciated.  Regional lack of oral and intravenous contrast limits evaluation  particularly for pericolonic abscess. The visualized osseous structures  appear intact. Thoracolumbar degenerative changes are identified.       Impression:          There is diffuse pancolonic inflammation without evidence of pericolonic  abscess or free air.     Nonspecific prominent fluid-filled loops of small bowel suggesting cysts  additional superimposed enteritis.      What is felt to represent the appendix within the right lower quadrant  is inflamed and measures up to 8 mm in thickness, although this is in  the setting extensive pericecal inflammation and is likely reactive  appendiceal inflammation given the extensive surrounding and adjacent  colitis noted.                Assessment/Plan   Assessment / Plan     Active Hospital Problems    Diagnosis POA   • **Pancolitis (CMS/HCC) [K51.00] Yes   • Rectal bleeding [K62.5] Yes   • Unintentional weight loss [R63.4] Yes   • Intractable diarrhea [R19.7] Yes   • Hypokalemia [E87.6] Yes   • Hypoalbuminemia [E88.09] Yes   • Iron deficiency anemia [D50.9] Yes   • Thrombocytosis (CMS/HCC) [D47.3] Yes     This is a 49-year-old male with no known medical problems who presents to the ED due to unintentional weight loss and intractable diarrhea found to have pancolitis.    Pancolitis with intractable diarrhea, unintentional weight loss and rectal bleeding  - Colorectal surgery consulted from ED, will see in a.m.  -We will start Levaquin and Flagyl for now  - History concerning for IBD  -Blood cultures and stool studies pending  - IV fluids    Iron deficiency anemia with thrombocytosis  -Type and screen  - A.m. labs  - Thrombocytosis likely secondary to iron deficiency or acute inflammation.  Consider further work-up  -Likely due to persistent rectal bleeding per patient history    Severe protein calorie malnutrition with unintentional weight loss  - Nutrition consult  - Protein  supplement  -HIV, TSH pending    DVT prophylaxis: SCDs    CODE STATUS:    Code Status and Medical Interventions:   Ordered at: 10/06/19 0114     Level Of Support Discussed With:    Patient     Code Status:    CPR     Medical Interventions (Level of Support Prior to Arrest):    Full       Admission Status:  I believe this patient meets INPATIENT status due to pancolitis requiring IV antibiotics and inpatient colorectal surgery evaluation.  I feel patient’s risk for adverse outcomes and need for care warrant INPATIENT evaluation and I predict the patient’s care encounter to likely last beyond 2 midnights.        Electronically signed by Lindsay Woods DO, 10/06/19, 1:21 AM.         Fair Fair Fair Fair Fair Fair Fair

## (undated) DEVICE — KT BIOGUARD SXN VLV AIR/H20 4PC DISP

## (undated) DEVICE — GRADUATE CONTN 1000ML

## (undated) DEVICE — INTRO ACCSR BLNT TP

## (undated) DEVICE — SPNG ENDO BEDSIDE TUB ENZYM

## (undated) DEVICE — JELLY,LUBE,STERILE,FLIP TOP,TUBE,2-OZ: Brand: MEDLINE

## (undated) DEVICE — SYR LUERLOK 50ML

## (undated) DEVICE — HYBRID CO2 TUBING/CAP SET FOR OLYMPUS® SCOPES & CO2 SOURCE: Brand: ERBE

## (undated) DEVICE — SOL IRR H2O BTL 1000ML STRL

## (undated) DEVICE — TUBING, SUCTION, 1/4" X 10', STRAIGHT: Brand: MEDLINE